# Patient Record
Sex: FEMALE | Race: WHITE | NOT HISPANIC OR LATINO | Employment: FULL TIME | URBAN - METROPOLITAN AREA
[De-identification: names, ages, dates, MRNs, and addresses within clinical notes are randomized per-mention and may not be internally consistent; named-entity substitution may affect disease eponyms.]

---

## 2017-01-23 ENCOUNTER — HOSPITAL ENCOUNTER (OUTPATIENT)
Dept: ULTRASOUND IMAGING | Facility: CLINIC | Age: 32
Discharge: HOME | End: 2017-01-23
Attending: OBSTETRICS & GYNECOLOGY | Admitting: OBSTETRICS & GYNECOLOGY
Payer: COMMERCIAL

## 2017-01-23 DIAGNOSIS — Z34.01 PRENATAL CARE, FIRST PREGNANCY, FIRST TRIMESTER: ICD-10-CM

## 2017-01-23 PROCEDURE — 76805 OB US >/= 14 WKS SNGL FETUS: CPT

## 2017-01-24 ENCOUNTER — PRENATAL OFFICE VISIT (OUTPATIENT)
Dept: OBGYN | Facility: CLINIC | Age: 32
End: 2017-01-24
Payer: COMMERCIAL

## 2017-01-24 VITALS
HEIGHT: 65 IN | BODY MASS INDEX: 30.06 KG/M2 | DIASTOLIC BLOOD PRESSURE: 74 MMHG | HEART RATE: 85 BPM | SYSTOLIC BLOOD PRESSURE: 113 MMHG | WEIGHT: 180.4 LBS

## 2017-01-24 DIAGNOSIS — Z34.02 PRENATAL CARE, FIRST PREGNANCY, SECOND TRIMESTER: ICD-10-CM

## 2017-01-24 DIAGNOSIS — K62.5 BRBPR (BRIGHT RED BLOOD PER RECTUM): Primary | ICD-10-CM

## 2017-01-24 PROCEDURE — 99207 ZZC PRENATAL VISIT: CPT | Performed by: OBSTETRICS & GYNECOLOGY

## 2017-01-24 NOTE — PROGRESS NOTES
Quick Note:    Please call with results and let her know they are normal. Thanks!    Char Burrell MD, MPH  ______

## 2017-01-24 NOTE — NURSING NOTE
"Chief Complaint   Patient presents with     Prenatal Care       Initial /74 mmHg  Pulse 85  Ht 5' 5\" (1.651 m)  Wt 180 lb 6.4 oz (81.829 kg)  BMI 30.02 kg/m2  LMP 09/04/2016  Breastfeeding? No Estimated body mass index is 30.02 kg/(m^2) as calculated from the following:    Height as of this encounter: 5' 5\" (1.651 m).    Weight as of this encounter: 180 lb 6.4 oz (81.829 kg).  BP completed using cuff size: nena Rodriguez CMA      "

## 2017-01-24 NOTE — PROGRESS NOTES
Having bright red blood per rectum.  Started a couple weeks ago, was told it was probably an internal hemorrhoid but now it's getting worse with clots out sometimes.  Denies constipation, feels like it is closer to diarrhea.  Also notes mucus in the discharge.  No melena.  No vomiting.   +FM, no ctx, no VB or LOF.    31 year old  at 20w2d   - GI consult for possible lower endoscopy for worsening BRBPR  - reviewed US results and gave print out - it's a BOY  - GCT, CBC next visit    RTC 4 weeks    Char Burrell MD, MPH  [facility]

## 2017-01-24 NOTE — MR AVS SNAPSHOT
After Visit Summary   1/24/2017    Gisele Meadows    MRN: [id number]           Patient Information     Date Of Birth          1985        Visit Information        Provider Department      1/24/2017 3:45 PM Char Burrell MD [facility]        Today's Diagnoses     BRBPR (bright red blood per rectum)    -  1     Prenatal care, first pregnancy, second trimester            Follow-ups after your visit        Additional Services     GASTROENTEROLOGY ADULT REF CONSULT ONLY       Preferred Location: [facility] [phone] and [facility] [phone]      Please be aware that coverage of these services is subject to the terms and limitations of your health insurance plan.  Call member services at your health plan with any benefit or coverage questions.  Any procedures must be performed at a [place] facility OR coordinated by your clinic's referral office.    Please bring the following with you to your appointment:    (1) Any X-Rays, CTs or MRIs which have been performed.  Contact the facility where they were done to arrange for  prior to your scheduled appointment.    (2) List of current medications   (3) This referral request   (4) Any documents/labs given to you for this referral            GASTROENTEROLOGY ADULT REF PROCEDURE ONLY       Last Lab Result: No results found for: CR  Body mass index is 30.02 kg/(m^2).      Patient will be contacted to schedule procedure.     Please be aware that coverage of these services is subject to the terms and limitations of your health insurance plan.  Call member services at your health plan with any benefit or coverage questions.  Any procedures must be performed at a [place] facility OR coordinated by your clinic's referral office.    Please bring the following with you to your appointment:    (1) Any X-Rays, CTs or MRIs which have been performed.  Contact the facility where they were done to arrange for  prior to your  scheduled appointment.    (2) List of current medications   (3) This referral request   (4) Any documents/labs given to you for this referral                  Your next 10 appointments already scheduled     Feb 20, 2017 11:15 AM   LAB with [facility] ([facility])    [address] [address]   [phone]           Patient must bring picture ID.  Patient should be prepared to give a urine specimen  Please do not eat 10-12 hours before your appointment if you are coming in fasting for labs on lipids, cholesterol, or glucose (sugar).  Pregnant women should follow their Care Team instructions. Water with medications is okay. Do not drink coffee or other fluids.   If you have concerns about taking  your medications, please ask at office or if scheduling via [employer], send a message by clicking on Secure Messaging, Message Your Care Team.            Feb 20, 2017 11:30 AM   ESTABLISHED PRENATAL with Char Burrell MD   [facility]    [address] [address]   [phone]              Who to contact     If you have questions or need follow up information about today's clinic visit or your schedule please contact [facility] directly at [phone].  Normal or non-critical lab and imaging results will be communicated to you by MyChart, letter or phone within 4 business days after the clinic has received the results. If you do not hear from us within 7 days, please contact the clinic through MyChart or phone. If you have a critical or abnormal lab result, we will notify you by phone as soon as possible.  Submit refill requests through [employer] or call your pharmacy and they will forward the refill request to us. Please allow 3 business days for your refill to be completed.          Additional Information About Your Visit        [employer] Information     [employer] gives you secure access to  "your electronic health record. If you see a primary care provider, you can also send messages to your care team and make appointments. If you have questions, please call your primary care clinic.  If you do not have a primary care provider, please call [phone] and they will assist you.        Care EveryWhere ID     This is your Care EveryWhere ID. This could be used by other organizations to access your [place] medical records  [id number]        Your Vitals Were     Pulse Height BMI (Body Mass Index) Last Period Breastfeeding?       85 5' 5\" (1.651 m) 30.02 kg/m2 09/04/2016 No        Blood Pressure from Last 3 Encounters:   01/24/17 113/74   12/27/16 112/68   11/28/16 128/78    Weight from Last 3 Encounters:   01/24/17 180 lb 6.4 oz (81.829 kg)   12/27/16 174 lb 8 oz (79.153 kg)   11/28/16 173 lb (78.472 kg)              We Performed the Following     GASTROENTEROLOGY ADULT REF CONSULT ONLY     GASTROENTEROLOGY ADULT REF PROCEDURE ONLY        Primary Care Provider Office Phone #    [facility] [phone] [address] [address]        Thank you!     Thank you for choosing [facility]  for your care. Our goal is always to provide you with excellent care. Hearing back from our patients is one way we can continue to improve our services. Please take a few minutes to complete the written survey that you may receive in the mail after your visit with us. Thank you!             Your Updated Medication List - Protect others around you: Learn how to safely use, store and throw away your medicines at www.disposemymeds.org.          This list is accurate as of: 1/24/17  4:17 PM.  Always use your most recent med list.                   Brand Name Dispense Instructions for use    albuterol 108 (90 BASE) MCG/ACT Inhaler   Generic drug:  albuterol      Inhale 1-2 puffs into the lungs       prenatal multivitamin  plus iron 27-0.8 MG Tabs per tablet      Take 1 " tablet by mouth daily       valACYclovir 1000 mg tablet    VALTREX    16 tablet    Take 2 tablets (2,000 mg) by mouth 2 times daily

## 2017-02-03 ENCOUNTER — TELEPHONE (OUTPATIENT)
Dept: OBGYN | Facility: CLINIC | Age: 32
End: 2017-02-03

## 2017-02-03 NOTE — TELEPHONE ENCOUNTER
Pt notified of below.  Pt reports understanding.  Pt does not have further questions or concerns.    Mandi Deng   Ob/Gyn Clinic  RN

## 2017-02-03 NOTE — TELEPHONE ENCOUNTER
"S-(situation): diarrhea for past 24 hours    B-(background): ,  has appointment with Gastroenterology next week, hx of loose stools with BRBPR    A-(assessment): patient reports in the last 24 hours, she has been having diarrhea like stools every 2 hours. Patient reports small amounts of blood in stool. Patient reports urinating every 2 hours as well. Patient reports urine is clear in color. Patient reports abdominal cramping prior to diarrhea which resolves after bowel movement's. Patient denies nausea or vomiting. Patient denies headache, dizziness or lightheadedness. Patient is drinking and eating as normal. Patient does not feel dehydrated. Patient does not really feel sick but feels \" acacia yucky I think just from going to the bathroom like that every 2 hours.\" patient denies vaginal bleeding or leaking of fluid. Pt denies fevers. Patient feeling fetal movement.    Patient asking if she can try Imodium AD? If she can, how many times/days?    R-(recommendations): Reviewed with patient to stay well hydrated and push fluids as able. Water or Gatorade, soup broth and BRAT diet reviewed.     Patient reports understanding and in agreement.  Please review and advise.    Thank you.    Mandi Deng   Ob/Gyn Clinic  RN      "

## 2017-02-03 NOTE — TELEPHONE ENCOUNTER
It is fine to take immodium sparingly in pregnancy (it is category C which means we don't think it causes any problems but we don't have enough studies to say for sure) but with heavy diarrhea you can also get dehydrated and if you feel like it is worsening and you can't stay hydrated, you should come in.

## 2017-02-03 NOTE — TELEPHONE ENCOUNTER
Pt called stating that she is 21 weeks EOB was seen last week by Dr. Goss and was referred to a gastroenterologist and has an appointment scheduled next week. Pt states that she is experiencing bouts of diahrrea every 2 hours and is wondering what she should do and if she should be seen in clinic. Please advise.    Yolanda Segura  Clinic Station

## 2017-02-07 ENCOUNTER — HOSPITAL ENCOUNTER (EMERGENCY)
Facility: CLINIC | Age: 32
Discharge: HOME | End: 2017-02-07
Attending: FAMILY MEDICINE | Admitting: FAMILY MEDICINE
Payer: COMMERCIAL

## 2017-02-07 ENCOUNTER — TELEPHONE (OUTPATIENT)
Dept: NURSING | Facility: CLINIC | Age: 32
End: 2017-02-07

## 2017-02-07 VITALS
DIASTOLIC BLOOD PRESSURE: 74 MMHG | OXYGEN SATURATION: 98 % | TEMPERATURE: 98 F | SYSTOLIC BLOOD PRESSURE: 113 MMHG | RESPIRATION RATE: 16 BRPM

## 2017-02-07 DIAGNOSIS — D64.9 ANEMIA, UNSPECIFIED TYPE: ICD-10-CM

## 2017-02-07 DIAGNOSIS — K92.1 HEMATOCHEZIA: ICD-10-CM

## 2017-02-07 LAB
ALBUMIN SERPL-MCNC: 2.4 G/DL (ref 3.4–5)
ALP SERPL-CCNC: 120 U/L (ref 40–150)
ALT SERPL W P-5'-P-CCNC: 19 U/L (ref 0–50)
ANION GAP SERPL CALCULATED.3IONS-SCNC: 7 MMOL/L (ref 3–14)
AST SERPL W P-5'-P-CCNC: 14 U/L (ref 0–45)
BASOPHILS # BLD AUTO: 0.1 10E9/L (ref 0–0.2)
BASOPHILS NFR BLD AUTO: 0.7 %
BILIRUB SERPL-MCNC: <0.1 MG/DL (ref 0.2–1.3)
BUN SERPL-MCNC: 6 MG/DL (ref 7–30)
CALCIUM SERPL-MCNC: 7.9 MG/DL (ref 8.5–10.1)
CHLORIDE SERPL-SCNC: 108 MMOL/L (ref 94–109)
CO2 SERPL-SCNC: 25 MMOL/L (ref 20–32)
CREAT SERPL-MCNC: 0.63 MG/DL (ref 0.52–1.04)
DIFFERENTIAL METHOD BLD: ABNORMAL
EOSINOPHIL # BLD AUTO: 1.5 10E9/L (ref 0–0.7)
EOSINOPHIL NFR BLD AUTO: 13.6 %
ERYTHROCYTE [DISTWIDTH] IN BLOOD BY AUTOMATED COUNT: 13.2 % (ref 10–15)
GFR SERPL CREATININE-BSD FRML MDRD: ABNORMAL ML/MIN/1.7M2
GLUCOSE SERPL-MCNC: 70 MG/DL (ref 70–99)
HCT VFR BLD AUTO: 29.1 % (ref 35–47)
HGB BLD-MCNC: 9.7 G/DL (ref 11.7–15.7)
IMM GRANULOCYTES # BLD: 0 10E9/L (ref 0–0.4)
IMM GRANULOCYTES NFR BLD: 0.3 %
LYMPHOCYTES # BLD AUTO: 3.2 10E9/L (ref 0.8–5.3)
LYMPHOCYTES NFR BLD AUTO: 29 %
MCH RBC QN AUTO: 31.1 PG (ref 26.5–33)
MCHC RBC AUTO-ENTMCNC: 33.3 G/DL (ref 31.5–36.5)
MCV RBC AUTO: 93 FL (ref 78–100)
MONOCYTES # BLD AUTO: 1.9 10E9/L (ref 0–1.3)
MONOCYTES NFR BLD AUTO: 16.9 %
NEUTROPHILS # BLD AUTO: 4.3 10E9/L (ref 1.6–8.3)
NEUTROPHILS NFR BLD AUTO: 39.5 %
PLATELET # BLD AUTO: 398 10E9/L (ref 150–450)
POTASSIUM SERPL-SCNC: 3.8 MMOL/L (ref 3.4–5.3)
PROT SERPL-MCNC: 6.4 G/DL (ref 6.8–8.8)
RBC # BLD AUTO: 3.12 10E12/L (ref 3.8–5.2)
SODIUM SERPL-SCNC: 140 MMOL/L (ref 133–144)
WBC # BLD AUTO: 11 10E9/L (ref 4–11)

## 2017-02-07 PROCEDURE — 99284 EMERGENCY DEPT VISIT MOD MDM: CPT | Performed by: FAMILY MEDICINE

## 2017-02-07 PROCEDURE — 85025 COMPLETE CBC W/AUTO DIFF WBC: CPT | Performed by: FAMILY MEDICINE

## 2017-02-07 PROCEDURE — 83540 ASSAY OF IRON: CPT | Performed by: FAMILY MEDICINE

## 2017-02-07 PROCEDURE — 82728 ASSAY OF FERRITIN: CPT | Performed by: FAMILY MEDICINE

## 2017-02-07 PROCEDURE — 80053 COMPREHEN METABOLIC PANEL: CPT | Performed by: FAMILY MEDICINE

## 2017-02-07 PROCEDURE — 99283 EMERGENCY DEPT VISIT LOW MDM: CPT

## 2017-02-07 PROCEDURE — 83550 IRON BINDING TEST: CPT | Performed by: FAMILY MEDICINE

## 2017-02-07 NOTE — ED AVS SNAPSHOT
[facility] Emergency Department    [address] [address]    Phone:  [phone]    Fax:  [phone]                                       Gisele Meadows   MRN: [id number]    Department:  [facility] Emergency Department   Date of Visit:  2/7/2017           After Visit Summary Signature Page     I have received my discharge instructions, and my questions have been answered. I have discussed any challenges I see with this plan with the nurse or doctor.    ..........................................................................................................................................  Patient/Patient Representative Signature      ..........................................................................................................................................  Patient Representative Print Name and Relationship to Patient    ..................................................               ................................................  Date                                            Time    ..........................................................................................................................................  Reviewed by Signature/Title    ...................................................              ..............................................  Date                                                            Time

## 2017-02-07 NOTE — ED AVS SNAPSHOT
[facility] Emergency Department    [address] [address]    Phone:  [phone]    Fax:  [phone]                                       Gisele Meadows   MRN: [id number]    Department:  [facility] Emergency Department   Date of Visit:  2/7/2017           Patient Information     Date Of Birth          1985        Your diagnoses for this visit were:     Hematochezia     Anemia, unspecified type        You were seen by Charlie Hendricks MD.        Discharge Instructions       Collect a stool sample and bring it to the lab for culture.  Call your ObGyn clinic to arrange for repeat testing of your hemoglobin later this week  Return to the emergency department if bleeding increases or he begins to feel lightheaded or have other new symptoms such as abdominal pain or rectal pain.    Future Appointments        Provider Department Dept Phone Center    2/20/2017 11:15 AM [facility] [phone] [facility]    2/20/2017 11:30 AM Char Burrell MD [facility] [phone] [facility]      24 Hour Appointment Hotline       To make an appointment at any [facility], call [phone] ([phone]). If you don't have a family doctor or clinic, we will help you find one. [facility] clinics are conveniently located to serve the needs of you and your family.          ED Discharge Orders     Enteric Bacteria and Virus Panel by MAJOR Stool                    Review of your medicines      Our records show that you are taking the medicines listed below. If these are incorrect, please call your family doctor or clinic.        Dose / Directions Last dose taken    albuterol 108 (90 BASE) MCG/ACT Inhaler   Dose:  1-2 puff   Generic drug:  albuterol        Inhale 1-2 puffs into the lungs   Refills:  0        prenatal multivitamin  plus iron 27-0.8 MG Tabs per tablet   Dose:  1 tablet        Take 1 tablet by mouth daily   Refills:  0        valACYclovir 1000 mg tablet   Commonly  known as:  VALTREX   Dose:  2000 mg   Quantity:  16 tablet        Take 2 tablets (2,000 mg) by mouth 2 times daily   Refills:  11                Procedures and tests performed during your visit     CBC with platelets differential    Comprehensive metabolic panel      Orders Needing Specimen Collection     Ordered          02/07/17 2131  Enteric Bacteria and Virus Panel by MAJOR Stool - STAT, Pri[place]: STAT, Needs to be Collected     Scheduled Task Status   02/07/17 2132 Collect Enteric Bacteria and Virus Panel by MAJOR Stool Open   Order Class:  PCU Collect                  Pending Results     No orders found from 2/6/2017 to 2/8/2017.            Pending Culture Results     No orders found from 2/6/2017 to 2/8/2017.       Test Results from your hospital stay           2/7/2017 10:38 PM - Interface, Flexilab Results      Component Results     Component Value Ref Range & Units Status    WBC 11.0 4.0 - 11.0 10e9/L Final    RBC Count 3.12 (L) 3.8 - 5.2 10e12/L Final    Hemoglobin 9.7 (L) 11.7 - 15.7 g/dL Final    Hematocrit 29.1 (L) 35.0 - 47.0 % Final    MCV 93 78 - 100 fl Final    MCH 31.1 26.5 - 33.0 pg Final    MCHC 33.3 31.5 - 36.5 g/dL Final    RDW 13.2 10.0 - 15.0 % Final    Platelet Count 398 150 - 450 10e9/L Final    Diff Method Automated Method  Final    % Neutrophils 39.5 % Final    % Lymphocytes 29.0 % Final    % Monocytes 16.9 % Final    % Eosinophils 13.6 % Final    % Basophils 0.7 % Final    % Immature Granulocytes 0.3 % Final    Absolute Neutrophil 4.3 1.6 - 8.3 10e9/L Final    Absolute Lymphocytes 3.2 0.8 - 5.3 10e9/L Final    Absolute Monocytes 1.9 (H) 0.0 - 1.3 10e9/L Final    Absolute Eosinophils 1.5 (H) 0.0 - 0.7 10e9/L Final    Absolute Basophils 0.1 0.0 - 0.2 10e9/L Final    Abs Immature Granulocytes 0.0 0 - 0.4 10e9/L Final         2/7/2017 10:54 PM - Interface, Flexilab Results      Component Results     Component Value Ref Range & Units Status    Sodium 140 133 - 144 mmol/L Final    Potassium 3.8 3.4 -  5.3 mmol/L Final    Chloride 108 94 - 109 mmol/L Final    Carbon Dioxide 25 20 - 32 mmol/L Final    Anion Gap 7 3 - 14 mmol/L Final    Glucose 70 70 - 99 mg/dL Final    Urea Nitrogen 6 (L) 7 - 30 mg/dL Final    Creatinine 0.63 0.52 - 1.04 mg/dL Final    GFR Estimate >90  Non  GFR Calc   >60 mL/min/1.7m2 Final    GFR Estimate If Black >90   GFR Calc   >60 mL/min/1.7m2 Final    Calcium 7.9 (L) 8.5 - 10.1 mg/dL Final    Bilirubin Total <0.1 (L) 0.2 - 1.3 mg/dL Final    Albumin 2.4 (L) 3.4 - 5.0 g/dL Final    Protein Total 6.4 (L) 6.8 - 8.8 g/dL Final    Alkaline Phosphatase 120 40 - 150 U/L Final    ALT 19 0 - 50 U/L Final    AST 14 0 - 45 U/L Final                Thank you for choosing [facility]       Thank you for choosing [facility] for your care. Our goal is always to provide you with excellent care. Hearing back from our patients is one way we can continue to improve our services. Please take a few minutes to complete the written survey that you may receive in the mail after you visit with us. Thank you!        [employer] Information     [employer] gives you secure access to your electronic health record. If you see a primary care provider, you can also send messages to your care team and make appointments. If you have questions, please call your primary care clinic.  If you do not have a primary care provider, please call [phone] and they will assist you.        Care EveryWhere ID     This is your Care EveryWhere ID. This could be used by other organizations to access your [facility] medical records  [id number]        After Visit Summary       This is your record. Keep this with you and show to your community pharmacist(s) and doctor(s) at your next visit.

## 2017-02-08 ENCOUNTER — PRENATAL OFFICE VISIT (OUTPATIENT)
Dept: OBGYN | Facility: CLINIC | Age: 32
End: 2017-02-08
Payer: COMMERCIAL

## 2017-02-08 VITALS
HEIGHT: 65 IN | BODY MASS INDEX: 29.59 KG/M2 | WEIGHT: 177.6 LBS | DIASTOLIC BLOOD PRESSURE: 56 MMHG | HEART RATE: 85 BPM | SYSTOLIC BLOOD PRESSURE: 106 MMHG

## 2017-02-08 DIAGNOSIS — K92.1 HEMATOCHEZIA: ICD-10-CM

## 2017-02-08 DIAGNOSIS — O26.842 UTERINE SIZE DATE DISCREPANCY, SECOND TRIMESTER: ICD-10-CM

## 2017-02-08 DIAGNOSIS — K62.5 HEMORRHAGE OF RECTUM AND ANUS: Primary | ICD-10-CM

## 2017-02-08 DIAGNOSIS — Z34.02 PRENATAL CARE, FIRST PREGNANCY, SECOND TRIMESTER: ICD-10-CM

## 2017-02-08 LAB
CAMPYLOBACTER GROUP BY NAT: NOT DETECTED
ENTERIC PATHOGEN COMMENT: NORMAL
FERRITIN SERPL-MCNC: 12 NG/ML (ref 12–150)
IRON SATN MFR SERPL: 9 % (ref 15–46)
IRON SERPL-MCNC: 32 UG/DL (ref 35–180)
NOROVIRUS I AND II BY NAT: NOT DETECTED
ROTAVIRUS A BY NAT: NOT DETECTED
SALMONELLA SPECIES BY NAT: NOT DETECTED
SHIGA TOXIN 1 GENE BY NAT: NOT DETECTED
SHIGA TOXIN 2 GENE BY NAT: NOT DETECTED
SHIGELLA SP+EIEC IPAH STL QL NAA+PROBE: NOT DETECTED
TIBC SERPL-MCNC: 349 UG/DL (ref 240–430)
VIBRIO GROUP BY NAT: NOT DETECTED
YERSINIA ENTEROCOLITICA BY NAT: NOT DETECTED

## 2017-02-08 PROCEDURE — 87506 IADNA-DNA/RNA PROBE TQ 6-11: CPT | Performed by: FAMILY MEDICINE

## 2017-02-08 PROCEDURE — 99207 ZZC PRENATAL VISIT: CPT | Performed by: OBSTETRICS & GYNECOLOGY

## 2017-02-08 RX ORDER — FERROUS GLUCONATE 324(38)MG
324 TABLET ORAL
Qty: 100 TABLET | Refills: 3 | Status: SHIPPED | OUTPATIENT
Start: 2017-02-08 | End: 2019-06-27

## 2017-02-08 NOTE — NURSING NOTE
"Chief Complaint   Patient presents with     Prenatal Care     er follow up, low hemoglobin       Initial /56 mmHg  Pulse 85  Ht 5' 5\" (1.651 m)  Wt 177 lb 9.6 oz (80.559 kg)  BMI 29.55 kg/m2  LMP 09/04/2016 Estimated body mass index is 29.55 kg/(m^2) as calculated from the following:    Height as of this encounter: 5' 5\" (1.651 m).    Weight as of this encounter: 177 lb 9.6 oz (80.559 kg).  Medication Reconciliation: complete     Lois King LPN      "

## 2017-02-08 NOTE — PROGRESS NOTES
"CC: prenatal visit  S:  Had heavy rectal bleeding yesterday and went to the Er.  It is better today. Has had diarrhea for weeks now.  No cramping/lof/vb/dc.  Good fm.  /56 mmHg  Pulse 85  Ht 5' 5\" (1.651 m)  Wt 177 lb 9.6 oz (80.559 kg)  BMI 29.55 kg/m2  LMP 09/04/2016  Stool cultures negative  The iron studies show low iron  A/P rectal bleeding-GI tomorrow  Anemia-iron three times a day  F/u ady     US for growth  Anuja Jauregui MD    "

## 2017-02-08 NOTE — TELEPHONE ENCOUNTER
Call Type: Triage Call    Presenting Problem: 22 weeks pregnant with rectal bleeding.  has GI  appt 2/9/2017 for this. Tonight had loose stool with more blood than  in past including clots. No s/s hypovolemia. Instructed ED per GI  Bleeding guideline.  Triage Note:  Guideline Title: Gastrointestinal Bleeding  Recommended Disposition: See ED Immediately  Original Inclination: Wanted to speak with a nurse  Override Disposition:  Intended Action: Follow advice given  Physician Contacted: No  Bloody diarrhea AND has not been evaluated ?  YES  New or worsening signs and symptoms that may indicate shock ? NO  Vomited blood after nosebleed ? NO  Following ingestion of toxic or caustic substance ? NO  Passing red, black or tarry material from rectum AND onset of new signs and  symptoms of hypovolemia ? NO  Unbearable abdominal/pelvic pain ? NO  Vomiting red, bloody or coffee-ground material, more than streaks of blood or  scant amount (not following nosebleed within past day) ? NO  Chest discomfort associated with shortness of breath, sweating, odd heartbeats or  different heart rate, nausea, vomiting, lightheadedness, or fainting lasting 5 or  more minutes now or within the last hour ? NO  Chest pain spreading to the shoulders, neck, jaw, in one or both arms, stomach or  back lasting 5 or more minutes now or within the last hour. Pain is NOT  associated with taking a deep breath or a productive cough, movement, or touch to  a localized area. ? NO  Pressure, fullness, squeezing sensation or pain anywhere in the chest lasting 5 or  more minutes now or within the last hour. Pain is NOT associated with taking a  deep breath or a productive cough, movement, or touch to a localized area on the  chest. ? NO  History of esophageal varices AND more than one episode of black or tarry stool ?  NO  Physician Instructions:  Care Advice: Another adult should drive.  Call  if signs and symptoms of shock develop (such as unable  to  stand due to faintness, dizziness, or lightheadedness  new onset of confusion  slow to respond or difficult to awaken  skin is pale, gray, cool, or moist to touch  severe weakness  loss of consciousness).  IMMEDIATE ACTION  Take sips of water or suck on ice chips as tolerated.  Do not eat solid  food until seen by provider.

## 2017-02-08 NOTE — DISCHARGE INSTRUCTIONS
Collect a stool sample and bring it to the lab for culture.  Call your ObGyn clinic to arrange for repeat testing of your hemoglobin later this week  Return to the emergency department if bleeding increases or he begins to feel lightheaded or have other new symptoms such as abdominal pain or rectal pain.

## 2017-02-09 ENCOUNTER — TRANSFERRED RECORDS (OUTPATIENT)
Dept: HEALTH INFORMATION MANAGEMENT | Facility: CLINIC | Age: 32
End: 2017-02-09

## 2017-02-20 ENCOUNTER — PRENATAL OFFICE VISIT (OUTPATIENT)
Dept: OBGYN | Facility: CLINIC | Age: 32
End: 2017-02-20
Payer: COMMERCIAL

## 2017-02-20 VITALS
SYSTOLIC BLOOD PRESSURE: 105 MMHG | HEART RATE: 88 BPM | BODY MASS INDEX: 29.99 KG/M2 | DIASTOLIC BLOOD PRESSURE: 73 MMHG | WEIGHT: 180 LBS | HEIGHT: 65 IN

## 2017-02-20 DIAGNOSIS — Z34.02 PRENATAL CARE, FIRST PREGNANCY, SECOND TRIMESTER: ICD-10-CM

## 2017-02-20 DIAGNOSIS — K64.8 INTERNAL HEMORRHOID: ICD-10-CM

## 2017-02-20 DIAGNOSIS — K62.5 HEMORRHAGE OF RECTUM AND ANUS: ICD-10-CM

## 2017-02-20 DIAGNOSIS — Z34.02 PRENATAL CARE, FIRST PREGNANCY, SECOND TRIMESTER: Primary | ICD-10-CM

## 2017-02-20 DIAGNOSIS — O26.842 UTERINE SIZE DATE DISCREPANCY, SECOND TRIMESTER: ICD-10-CM

## 2017-02-20 LAB
GLUCOSE 1H P 50 G GLC PO SERPL-MCNC: 120 MG/DL (ref 60–129)
HGB BLD-MCNC: 10.3 G/DL (ref 11.7–15.7)

## 2017-02-20 PROCEDURE — 00000218 ZZHCL STATISTIC OBHBG - HEMOGLOBIN: Performed by: OBSTETRICS & GYNECOLOGY

## 2017-02-20 PROCEDURE — 99207 ZZC PRENATAL VISIT: CPT | Performed by: OBSTETRICS & GYNECOLOGY

## 2017-02-20 PROCEDURE — 36415 COLL VENOUS BLD VENIPUNCTURE: CPT | Performed by: OBSTETRICS & GYNECOLOGY

## 2017-02-20 PROCEDURE — 82950 GLUCOSE TEST: CPT | Performed by: OBSTETRICS & GYNECOLOGY

## 2017-02-20 NOTE — NURSING NOTE
"Chief Complaint   Patient presents with     Prenatal Care       Initial /73 (BP Location: Right arm, Patient Position: Chair, Cuff Size: Adult Large)  Pulse 88  Ht 5' 5\" (1.651 m)  Wt 180 lb (81.6 kg)  LMP 09/04/2016  Breastfeeding? No  BMI 29.95 kg/m2 Estimated body mass index is 29.95 kg/(m^2) as calculated from the following:    Height as of this encounter: 5' 5\" (1.651 m).    Weight as of this encounter: 180 lb (81.6 kg).  Medication Reconciliation: complete   Jackie Rodriguez, JESSE      "

## 2017-02-20 NOTE — MR AVS SNAPSHOT
After Visit Summary   2/20/2017    Gisele Meadows    MRN: [id number]           Patient Information     Date Of Birth          1985        Visit Information        Provider Department      2/20/2017 11:30 AM Char Burrell MD [facility]        Today's Diagnoses     Prenatal care, first pregnancy, second trimester    -  1    Internal hemorrhoid           Follow-ups after your visit        Follow-up notes from your care team     Return in about 4 weeks (around 3/20/2017) for OB Follow Up.      Your next 10 appointments already scheduled     Mar 22, 2017  3:45 PM CDT   ESTABLISHED PRENATAL with Anuja Jauregui MD   [facility] ([facility]    [address] [address] [phone]              Who to contact     If you have questions or need follow up information about today's clinic visit or your schedule please contact [facility] directly at [phone].  Normal or non-critical lab and imaging results will be communicated to you by [employer], letter or phone within 4 business days after the clinic has received the results. If you do not hear from us within 7 days, please contact the clinic through Mind[employer] or phone. If you have a critical or abnormal lab result, we will notify you by phone as soon as possible.  Submit refill requests through K[employer]le or call your pharmacy and they will forward the refill request to us. Please allow 3 business days for your refill to be completed.          Additional Information About Your Visit        [employer] Information     [employer] gives you secure access to your electronic health record. If you see a primary care provider, you can also send messages to your care team and make appointments. If you have questions, please call your primary care clinic.  If you do not have a primary care provider, please call [phone] and they will assist you.        Care EveryWhere  "ID     This is your Care EveryWhere ID. This could be used by other organizations to access your [place] medical records  [id number]        Your Vitals Were     Pulse Height Last Period Breastfeeding? BMI (Body Mass Index)       88 1.651 m (5' 5\") 09/04/2016 No 29.95 kg/m2        Blood Pressure from Last 3 Encounters:   02/20/17 105/73   02/08/17 106/56   02/07/17 113/74    Weight from Last 3 Encounters:   02/20/17 81.6 kg (180 lb)   02/08/17 80.6 kg (177 lb 9.6 oz)   01/24/17 81.8 kg (180 lb 6.4 oz)              Today, you had the following     No orders found for display       Primary Care Provider Office Phone #    [facility] [phone] [address] [address]        Thank you!     Thank you for choosing [facility]  for your care. Our goal is always to provide you with excellent care. Hearing back from our patients is one way we can continue to improve our services. Please take a few minutes to complete the written survey that you may receive in the mail after your visit with us. Thank you!             Your Updated Medication List - Protect others around you: Learn how to safely use, store and throw away your medicines at www.disposemymeds.org.          This list is accurate as of: 2/20/17 11:45 AM.  Always use your most recent med list.                   Brand Name Dispense Instructions for use    albuterol 108 (90 BASE) MCG/ACT Inhaler   Generic drug:  albuterol      Inhale 1-2 puffs into the lungs       ferrous gluconate 324 (38 FE) MG tablet    FERGON    100 tablet    Take 1 tablet (324 mg) by mouth 3 times daily (with meals)       prenatal multivitamin  plus iron 27-0.8 MG Tabs per tablet      Take 1 tablet by mouth daily       valACYclovir 1000 mg tablet    VALTREX    16 tablet    Take 2 tablets (2,000 mg) by mouth 2 times daily         "

## 2017-02-20 NOTE — PROGRESS NOTES
Doing better, s/p GI consult.  They thought it was probably bleeding from internal hemorrhoids and deferred the endoscope for now.  They put her on metamucil.  She also started iron since being seen in the ED.  The BRBPR bleeding has improved, and the stools are now a little more firm.  She is taking iron supplements TID.  +FM, no ctx, no VB or LOF.    31 year old  at 24w1d   - GCT and hgb today  -h/o HSV2, last outbreak about a month ago - still start valtrex ppx at 36 weeks  - TDaP next visit    RTC 4 weeks    Char Burrell MD, MPH  [facility]

## 2017-03-20 ENCOUNTER — HOSPITAL ENCOUNTER (OUTPATIENT)
Dept: ULTRASOUND IMAGING | Facility: CLINIC | Age: 32
Discharge: HOME | End: 2017-03-20
Attending: OBSTETRICS & GYNECOLOGY | Admitting: OBSTETRICS & GYNECOLOGY
Payer: COMMERCIAL

## 2017-03-20 PROCEDURE — 76816 OB US FOLLOW-UP PER FETUS: CPT

## 2017-03-21 NOTE — PROGRESS NOTES
Gisele  Your results are normal.  If you have any other questions or concerns, please followup in the office or contact us on mychart or evisit.    Anuja Jauregui

## 2017-03-22 ENCOUNTER — PRENATAL OFFICE VISIT (OUTPATIENT)
Dept: OBGYN | Facility: CLINIC | Age: 32
End: 2017-03-22
Payer: COMMERCIAL

## 2017-03-22 VITALS
SYSTOLIC BLOOD PRESSURE: 117 MMHG | WEIGHT: 185.4 LBS | BODY MASS INDEX: 30.85 KG/M2 | DIASTOLIC BLOOD PRESSURE: 72 MMHG | HEART RATE: 89 BPM

## 2017-03-22 DIAGNOSIS — Z34.02 PRENATAL CARE, FIRST PREGNANCY, SECOND TRIMESTER: ICD-10-CM

## 2017-03-22 DIAGNOSIS — A60.00 RECURRENT GENITAL HSV (HERPES SIMPLEX VIRUS) INFECTION: ICD-10-CM

## 2017-03-22 DIAGNOSIS — Z23 NEED FOR TDAP VACCINATION: Primary | ICD-10-CM

## 2017-03-22 PROCEDURE — 99207 ZZC PRENATAL VISIT: CPT | Performed by: OBSTETRICS & GYNECOLOGY

## 2017-03-22 PROCEDURE — 90471 IMMUNIZATION ADMIN: CPT | Performed by: OBSTETRICS & GYNECOLOGY

## 2017-03-22 PROCEDURE — 90715 TDAP VACCINE 7 YRS/> IM: CPT | Performed by: OBSTETRICS & GYNECOLOGY

## 2017-03-22 RX ORDER — VALACYCLOVIR HYDROCHLORIDE 500 MG/1
TABLET, FILM COATED ORAL
Qty: 90 TABLET | Refills: 3 | Status: SHIPPED | OUTPATIENT
Start: 2017-03-22 | End: 2017-05-17

## 2017-03-22 NOTE — MR AVS SNAPSHOT
After Visit Summary   3/22/2017    Gisele Meadows    MRN: [id number]           Patient Information     Date Of Birth          1985        Visit Information        Provider Department      3/22/2017 3:45 PM Anuja Jauregui MD [facility]        Today's Diagnoses     Need for Tdap vaccination    -  1    Twin pregnancy with fetal loss and retention of one fetus, antepartum        Prenatal care, first pregnancy, second trimester        Recurrent genital HSV (herpes simplex virus) infection           Follow-ups after your visit        Who to contact     If you have questions or need follow up information about today's clinic visit or your schedule please contact [facility] directly at [phone].  Normal or non-critical lab and imaging results will be communicated to you by MyChart, letter or phone within 4 business days after the clinic has received the results. If you do not hear from us within 7 days, please contact the clinic through [employer] or phone. If you have a critical or abnormal lab result, we will notify you by phone as soon as possible.  Submit refill requests through Best Bid or call your pharmacy and they will forward the refill request to us. Please allow 3 business days for your refill to be completed.          Additional Information About Your Visit        MyChart Information     [employer] gives you secure access to your electronic health record. If you see a primary care provider, you can also send messages to your care team and make appointments. If you have questions, please call your primary care clinic.  If you do not have a primary care provider, please call [phone] and they will assist you.        Care EveryWhere ID     This is your Care EveryWhere ID. This could be used by other organizations to access your [place] medical records  [id number]        Your Vitals Were     Pulse Last Period BMI (Body Mass Index)             89  09/04/2016 30.85 kg/m2          Blood Pressure from Last 3 Encounters:   03/22/17 117/72   02/20/17 105/73   02/08/17 106/56    Weight from Last 3 Encounters:   03/22/17 185 lb 6.4 oz (84.1 kg)   02/20/17 180 lb (81.6 kg)   02/08/17 177 lb 9.6 oz (80.6 kg)              We Performed the Following     TDAP VACCINE (ADACEL)     VACCINE ADMINISTRATION, INITIAL          Today's Medication Changes          These changes are accurate as of: 3/22/17  4:05 PM.  If you have any questions, ask your nurse or doctor.               These medicines have changed or have updated prescriptions.        Dose/Directions    * valACYclovir 1000 mg tablet   Commonly known as:  VALTREX   This may have changed:  Another medication with the same name was added. Make sure you understand how and when to take each.   Used for:  Recurrent cold sores        Dose:  2000 mg   Take 2 tablets (2,000 mg) by mouth 2 times daily   Quantity:  16 tablet   Refills:  11       * valACYclovir 500 MG tablet   Commonly known as:  VALTREX   This may have changed:  You were already taking a medication with the same name, and this prescription was added. Make sure you understand how and when to take each.   Used for:  Twin pregnancy with fetal loss and retention of one fetus, antepartum, Prenatal care, first pregnancy, second trimester, Need for Tdap vaccination, Recurrent genital HSV (herpes simplex virus) infection        Take 1 tablet twice a day for 3 days and then start daily   Quantity:  90 tablet   Refills:  3       * Notice:  This list has 2 medication(s) that are the same as other medications prescribed for you. Read the directions carefully, and ask your doctor or other care provider to review them with you.         Where to get your medicines      These medications were sent to [id number]  [place], [place] - [address]  [address] [place] [address]     Phone:  [phone]     valACYclovir 500 MG tablet                Primary Care  Provider Office Phone #    [facility] [phone] [address]  [facility] [address]        Thank you!     Thank you for choosing [facility]  for your care. Our goal is always to provide you with excellent care. Hearing back from our patients is one way we can continue to improve our services. Please take a few minutes to complete the written survey that you may receive in the mail after your visit with us. Thank you!             Your Updated Medication List - Protect others around you: Learn how to safely use, store and throw away your medicines at www.disposemymeds.org.          This list is accurate as of: 3/22/17  4:05 PM.  Always use your most recent med list.                   Brand Name Dispense Instructions for use    albuterol 108 (90 BASE) MCG/ACT Inhaler   Generic drug:  albuterol      Inhale 1-2 puffs into the lungs       ferrous gluconate 324 (38 FE) MG tablet    FERGON    100 tablet    Take 1 tablet (324 mg) by mouth 3 times daily (with meals)       prenatal multivitamin  plus iron 27-0.8 MG Tabs per tablet      Take 1 tablet by mouth daily       * valACYclovir 1000 mg tablet    VALTREX    16 tablet    Take 2 tablets (2,000 mg) by mouth 2 times daily       * valACYclovir 500 MG tablet    VALTREX    90 tablet    Take 1 tablet twice a day for 3 days and then start daily       * Notice:  This list has 2 medication(s) that are the same as other medications prescribed for you. Read the directions carefully, and ask your doctor or other care provider to review them with you.

## 2017-03-22 NOTE — NURSING NOTE
"Initial /72 (BP Location: Right arm, Patient Position: Chair, Cuff Size: Adult Regular)  Pulse 89  Wt 185 lb 6.4 oz (84.1 kg)  LMP 09/04/2016  BMI 30.85 kg/m2 Estimated body mass index is 30.85 kg/(m^2) as calculated from the following:    Height as of 2/20/17: 5' 5\" (1.651 m).    Weight as of this encounter: 185 lb 6.4 oz (84.1 kg). .    "

## 2017-03-22 NOTE — PROGRESS NOTES
CC: prenatal  S:  Having an outbreak starting last week of her herpetic genital infection she was diagnosed with in the past.  Only took valtrex with that outbreak which when she was diagnosed in August.  No ctx/lof/vb.  Good fm.  /72 (BP Location: Right arm, Patient Position: Chair, Cuff Size: Adult Regular)  Pulse 89  Wt 185 lb 6.4 oz (84.1 kg)  LMP 09/04/2016  BMI 30.85 kg/m2   Estimated fetal weight: 1,514 grams, corresponding to the 82nd  percentile based on the reported previously established due date.          IMPRESSION:   1. Single live intrauterine pregnancy of 30 weeks and 3 days gestation  by current ultrasound measurement. Fetal growth is 2 weeks and 2 days  more advanced than what is expected from the reported previously  established due date.  2. Estimated fetal weight is at the 82nd percentile.      A/P recurrent HSV-valtrex twice a day for 3 days and then start suppression now.  She desires.  Routine precautions  Breastfeeding discussed  Doing the classes  RTC in 2 week  Anuja Jauregui MD

## 2017-04-05 ENCOUNTER — PRENATAL OFFICE VISIT (OUTPATIENT)
Dept: OBGYN | Facility: CLINIC | Age: 32
End: 2017-04-05
Payer: COMMERCIAL

## 2017-04-05 VITALS
BODY MASS INDEX: 31.16 KG/M2 | SYSTOLIC BLOOD PRESSURE: 125 MMHG | HEART RATE: 92 BPM | WEIGHT: 187 LBS | DIASTOLIC BLOOD PRESSURE: 76 MMHG | HEIGHT: 65 IN

## 2017-04-05 DIAGNOSIS — O98.313 GENITAL HERPES AFFECTING PREGNANCY IN THIRD TRIMESTER: ICD-10-CM

## 2017-04-05 DIAGNOSIS — A60.00 RECURRENT GENITAL HSV (HERPES SIMPLEX VIRUS) INFECTION: ICD-10-CM

## 2017-04-05 DIAGNOSIS — A60.09 GENITAL HERPES AFFECTING PREGNANCY IN THIRD TRIMESTER: ICD-10-CM

## 2017-04-05 DIAGNOSIS — Z34.03 PRENATAL CARE, FIRST PREGNANCY, THIRD TRIMESTER: Primary | ICD-10-CM

## 2017-04-05 PROCEDURE — 99207 ZZC PRENATAL VISIT: CPT | Performed by: OBSTETRICS & GYNECOLOGY

## 2017-04-05 RX ORDER — VALACYCLOVIR HYDROCHLORIDE 1 G/1
1000 TABLET, FILM COATED ORAL DAILY
Qty: 60 TABLET | Refills: 3 | Status: SHIPPED | OUTPATIENT
Start: 2017-04-05 | End: 2018-11-19

## 2017-04-05 NOTE — PROGRESS NOTES
"CC: prenatal  HSV outbreak  S:  The outbreak wasn't healing with the prior dose, she is starting the 1 gram 3 times per day for 7 days and then will increase prophylaxis to a 1gram a day.  No lof/vb/dc.  Good fm.  [place] cason resolved.  /76  Pulse 92  Ht 5' 5\" (1.651 m)  Wt 187 lb (84.8 kg)  LMP 09/04/2016  BMI 31.12 kg/m2  She declined an exam this time  A/P Recurrent HSV-plan for increasing the dose.    Routine precautions  F/u 2 weeks  Anuja Jauregui MD    "

## 2017-04-05 NOTE — NURSING NOTE
"Chief Complaint   Patient presents with     Prenatal Care       Initial /76  Pulse 92  Ht 5' 5\" (1.651 m)  Wt 187 lb (84.8 kg)  LMP 09/04/2016  BMI 31.12 kg/m2 Estimated body mass index is 31.12 kg/(m^2) as calculated from the following:    Height as of this encounter: 5' 5\" (1.651 m).    Weight as of this encounter: 187 lb (84.8 kg).  Medication Reconciliation: complete     Lois King LPN        "

## 2017-04-05 NOTE — MR AVS SNAPSHOT
After Visit Summary   4/5/2017    Gisele Meadows    MRN: [id number]           Patient Information     Date Of Birth          1985        Visit Information        Provider Department      4/5/2017 3:30 PM Anuja Jauregui MD [facility]        Today's Diagnoses     Prenatal care, first pregnancy, third trimester    -  1    Genital herpes affecting pregnancy in third trimester        Recurrent genital HSV (herpes simplex virus) infection           Follow-ups after your visit        Your next 10 appointments already scheduled     Apr 18, 2017  3:00 PM CDT   ESTABLISHED PRENATAL with Destiny Cates MD   [facility]    [address] [facility] [address]   [phone]            May 02, 2017  3:45 PM CDT   ESTABLISHED PRENATAL with Destiny Cates MD   [facility]    [address] [address]   [phone]            May 17, 2017  4:00 PM CDT   ESTABLISHED PRENATAL with Destiny Cates MD   [facility]    [address] [address]   [phone]            May 24, 2017  3:30 PM CDT   ESTABLISHED PRENATAL with Anuja Jauregui MD   [facility]    [address] [facility] [address]   [phone]            May 30, 2017  3:30 PM CDT   ESTABLISHED PRENATAL with Destiny Cates MD   [facility]    [address] [address]   [phone]              Who to contact     If you have questions or need follow up information about today's clinic visit or your schedule please contact [facility] directly at [phone].  Normal or non-critical lab and imaging results will be communicated to you by MyChart, letter or phone within 4 business days after the clinic  "has received the results. If you do not hear from us within 7 days, please contact the clinic through [employer] or phone. If you have a critical or abnormal lab result, we will notify you by phone as soon as possible.  Submit refill requests through [employer] or call your pharmacy and they will forward the refill request to us. Please allow 3 business days for your refill to be completed.          Additional Information About Your Visit        [employer] Information     [employer] gives you secure access to your electronic health record. If you see a primary care provider, you can also send messages to your care team and make appointments. If you have questions, please call your primary care clinic.  If you do not have a primary care provider, please call [phone] and they will assist you.        Care EveryWhere ID     This is your Care EveryWhere ID. This could be used by other organizations to access your [place] medical records  [id number]        Your Vitals Were     Pulse Height Last Period BMI (Body Mass Index)          92 5' 5\" (1.651 m) 09/04/2016 31.12 kg/m2         Blood Pressure from Last 3 Encounters:   04/05/17 125/76   03/22/17 117/72   02/20/17 105/73    Weight from Last 3 Encounters:   04/05/17 187 lb (84.8 kg)   03/22/17 185 lb 6.4 oz (84.1 kg)   02/20/17 180 lb (81.6 kg)              Today, you had the following     No orders found for display         Today's Medication Changes          These changes are accurate as of: 4/5/17  3:44 PM.  If you have any questions, ask your nurse or doctor.               These medicines have changed or have updated prescriptions.        Dose/Directions    * valACYclovir 1000 mg tablet   Commonly known as:  VALTREX   This may have changed:  Another medication with the same name was added. Make sure you understand how and when to take each.   Used for:  Recurrent cold sores   Changed by:  Destiny Cates MD        Dose:  2000 mg   Take 2 tablets (2,000 mg) by mouth 2 " times daily   Quantity:  16 tablet   Refills:  11       * valACYclovir 500 MG tablet   Commonly known as:  VALTREX   This may have changed:  Another medication with the same name was added. Make sure you understand how and when to take each.   Used for:  Twin pregnancy with fetal loss and retention of one fetus, antepartum, Prenatal care, first pregnancy, second trimester, Need for Tdap vaccination, Recurrent genital HSV (herpes simplex virus) infection   Changed by:  Anuja Jauregui MD        Take 1 tablet twice a day for 3 days and then start daily   Quantity:  90 tablet   Refills:  3       * valACYclovir 1000 mg tablet   Commonly known as:  VALTREX   This may have changed:  Another medication with the same name was added. Make sure you understand how and when to take each.   Used for:  HSV infection   Changed by:  Anuja Jauregui MD        Dose:  1000 mg   Take 1 tablet (1,000 mg) by mouth 3 times daily for 7 days   Quantity:  21 tablet   Refills:  0       * valACYclovir 1000 mg tablet   Commonly known as:  VALTREX   This may have changed:  You were already taking a medication with the same name, and this prescription was added. Make sure you understand how and when to take each.   Used for:  Prenatal care, first pregnancy, third trimester, Genital herpes affecting pregnancy in third trimester   Changed by:  Anuja Jauregui MD        Dose:  1000 mg   Take 1 tablet (1,000 mg) by mouth daily   Quantity:  60 tablet   Refills:  3       * Notice:  This list has 4 medication(s) that are the same as other medications prescribed for you. Read the directions carefully, and ask your doctor or other care provider to review them with you.         Where to get your medicines      These medications were sent to [facility] [address] [place] [address] - [address]  [address], [place] [address]     Phone:  [phone]     valACYclovir 1000 mg tablet                 Primary Care Provider Office Phone #    [facility] [phone] [address]  [facility] [address]        Thank you!     Thank you for choosing [facility]  for your care. Our goal is always to provide you with excellent care. Hearing back from our patients is one way we can continue to improve our services. Please take a few minutes to complete the written survey that you may receive in the mail after your visit with us. Thank you!             Your Updated Medication List - Protect others around you: Learn how to safely use, store and throw away your medicines at www.disposemymeds.org.          This list is accurate as of: 4/5/17  3:44 PM.  Always use your most recent med list.                   Brand Name Dispense Instructions for use    albuterol 108 (90 BASE) MCG/ACT Inhaler   Generic drug:  albuterol      Inhale 1-2 puffs into the lungs Reported on 4/5/2017       ferrous gluconate 324 (38 FE) MG tablet    FERGON    100 tablet    Take 1 tablet (324 mg) by mouth 3 times daily (with meals)       prenatal multivitamin  plus iron 27-0.8 MG Tabs per tablet      Take 1 tablet by mouth daily       * valACYclovir 1000 mg tablet    VALTREX    16 tablet    Take 2 tablets (2,000 mg) by mouth 2 times daily       * valACYclovir 500 MG tablet    VALTREX    90 tablet    Take 1 tablet twice a day for 3 days and then start daily       * valACYclovir 1000 mg tablet    VALTREX    21 tablet    Take 1 tablet (1,000 mg) by mouth 3 times daily for 7 days       * valACYclovir 1000 mg tablet    VALTREX    60 tablet    Take 1 tablet (1,000 mg) by mouth daily       * Notice:  This list has 4 medication(s) that are the same as other medications prescribed for you. Read the directions carefully, and ask your doctor or other care provider to review them with you.

## 2017-04-18 ENCOUNTER — PRENATAL OFFICE VISIT (OUTPATIENT)
Dept: OBGYN | Facility: CLINIC | Age: 32
End: 2017-04-18
Payer: COMMERCIAL

## 2017-04-18 VITALS
SYSTOLIC BLOOD PRESSURE: 119 MMHG | HEART RATE: 81 BPM | HEIGHT: 65 IN | BODY MASS INDEX: 31.65 KG/M2 | WEIGHT: 190 LBS | DIASTOLIC BLOOD PRESSURE: 69 MMHG

## 2017-04-18 DIAGNOSIS — N90.89 VULVAR IRRITATION: ICD-10-CM

## 2017-04-18 DIAGNOSIS — Z34.03 PRENATAL CARE, FIRST PREGNANCY, THIRD TRIMESTER: Primary | ICD-10-CM

## 2017-04-18 LAB
MICRO REPORT STATUS: ABNORMAL
SPECIMEN SOURCE: ABNORMAL
WET PREP SPEC: ABNORMAL

## 2017-04-18 PROCEDURE — 87210 SMEAR WET MOUNT SALINE/INK: CPT | Performed by: OBSTETRICS & GYNECOLOGY

## 2017-04-18 PROCEDURE — 99213 OFFICE O/P EST LOW 20 MIN: CPT | Performed by: OBSTETRICS & GYNECOLOGY

## 2017-04-18 RX ORDER — NYSTATIN 100000 U/G
OINTMENT TOPICAL 3 TIMES DAILY
Qty: 15 G | Refills: 1 | Status: SHIPPED | OUTPATIENT
Start: 2017-04-18 | End: 2017-05-02

## 2017-04-18 RX ORDER — FLUCONAZOLE 150 MG/1
150 TABLET ORAL ONCE
Qty: 1 TABLET | Refills: 0 | Status: SHIPPED | OUTPATIENT
Start: 2017-04-18 | End: 2017-04-18

## 2017-04-18 NOTE — NURSING NOTE
"Initial /69 (BP Location: Left arm, Patient Position: Chair, Cuff Size: Adult Large)  Pulse 81  Ht 5' 5\" (1.651 m)  Wt 190 lb (86.2 kg)  LMP 09/04/2016  BMI 31.62 kg/m2 Estimated body mass index is 31.62 kg/(m^2) as calculated from the following:    Height as of this encounter: 5' 5\" (1.651 m).    Weight as of this encounter: 190 lb (86.2 kg). .      "

## 2017-04-18 NOTE — PROGRESS NOTES
"CC: Here for routine prenatal visit @ 32w2d   HPI: + FM, no ctx, no LOF, no VB.  Still with vaginal irritation.  Unsure if it's her HSV or if she has a yeast infection. Denies new soaps or detergents.  Not shaving or waxing either.      PE: /69 (BP Location: Left arm, Patient Position: Chair, Cuff Size: Adult Large)  Pulse 81  Ht 5' 5\" (1.651 m)  Wt 190 lb (86.2 kg)  LMP 09/04/2016  BMI 31.62 kg/m2   See OB flowsheet    No obvious ulcerations of the labia minora or majora.  Moderate erythema of bilateral labia minora.  Moderate amount of white discharge.    Microscopic wet-mount exam shows scant monilia.     A/P G1 @ 32w2d normal pregnancy, vaginal irritation    1. Routine prenatal care  2. Will trial diflucan and nystatin for now.  If no response, will consider mild steroid ointment.     RTC 2 weeks.      Destiny Cates M.D.    "

## 2017-04-18 NOTE — MR AVS SNAPSHOT
After Visit Summary   4/18/2017    Gisele Meadows    MRN: [id number]           Patient Information     Date Of Birth          1985        Visit Information        Provider Department      4/18/2017 3:00 PM Destiny Cates MD [facility]        Today's Diagnoses     Prenatal care, first pregnancy, third trimester    -  1    Vulvar irritation          Care Instructions    Return in two weeks.        Follow-ups after your visit        Your next 10 appointments already scheduled     May 02, 2017  3:45 PM CDT   [facility]    [address] [facility] [address]   [phone]            May 17, 2017  4:00 PM CDT   ESTABL[facility] PRENATAL with [facility]    [address] [facility] [address] [facility] [address]   [phone]            May 24, 2017  3:30 PM CDT   ESTABLISHED PRENATAL with [facility]gui [facility]    [address] [facility] [address] [address]   [phone]            May 30, 2017  3:30 PM CDT   ESTABL[facility] PRENATAL [facility]    [address] [facility] [address]   [phone]            Jun 07, 2017  3:30 PM CDT   ESTABL[facility] PRENATAL with [facility] C[facility]   [facility]    [address] [facility] [address]   [phone]              Who to contact     If you have questions or need follow up information about today's clinic visit or your schedule please contact [facility] directly at [phone].  Normal or non-critical lab and imaging results will be communicated to you by MyChart, letter or phone within 4 business days after the clinic has received the results. If you do not hear from us within 7  "days, please contact the clinic through [employer] or phone. If you have a critical or abnormal lab result, we will notify you by phone as soon as possible.  Submit refill requests through [employer] or call your pharmacy and they will forward the refill request to us. Please allow 3 business days for your refill to be completed.          Additional Information About Your Visit        [employer] Information     [employer] gives you secure access to your electronic health record. If you see a primary care provider, you can also send messages to your care team and make appointments. If you have questions, please call your primary care clinic.  If you do not have a primary care provider, please call [phone] and they will assist you.        Care EveryWhere ID     This is your Care EveryWhere ID. This could be used by other organizations to access your [place] medical records  [id number]        Your Vitals Were     Pulse Height Last Period BMI (Body Mass Index)          81 5' 5\" (1.651 m) 09/04/2016 31.62 kg/m2         Blood Pressure from Last 3 Encounters:   04/18/17 119/69   04/05/17 125/76   03/22/17 117/72    Weight from Last 3 Encounters:   04/18/17 190 lb (86.2 kg)   04/05/17 187 lb (84.8 kg)   03/22/17 185 lb 6.4 oz (84.1 kg)              We Performed the Following     Wet prep          Today's Medication Changes          These changes are accurate as of: 4/18/17  3:29 PM.  If you have any questions, ask your nurse or doctor.               Start taking these medicines.        Dose/Directions    fluconazole 150 MG tablet   Commonly known as:  DIFLUCAN   Used for:  Vulvar irritation   Started by:  Destiny Cates MD        Dose:  150 mg   Take 1 tablet (150 mg) by mouth once for 1 dose   Quantity:  1 tablet   Refills:  0       nystatin ointment   Commonly known as:  MYCOSTATIN   Used for:  Vulvar irritation   Started by:  Destiny Cates MD        Apply topically 3 times daily for 14 days   Quantity:  15 g "   Refills:  1            Where to get your medicines      These medications were sent to [id number]  [place], [place] - [address]  [address] [place] [address]     Phone:  [phone]     fluconazole 150 MG tablet    nystatin ointment                Primary Care Provider Office Phone #    [facility] [phone] [address] [facility] [address]        Thank you!     Thank you for choosing [facility]  for your care. Our goal is always to provide you with excellent care. Hearing back from our patients is one way we can continue to improve our services. Please take a few minutes to complete the written survey that you may receive in the mail after your visit with us. Thank you!             Your Updated Medication List - Protect others around you: Learn how to safely use, store and throw away your medicines at www.disposemymeds.org.          This list is accurate as of: 4/18/17  3:29 PM.  Always use your most recent med list.                   Brand Name Dispense Instructions for use    albuterol 108 (90 BASE) MCG/ACT Inhaler   Generic drug:  albuterol      Inhale 1-2 puffs into the lungs Reported on 4/5/2017       ferrous gluconate 324 (38 FE) MG tablet    FERGON    100 tablet    Take 1 tablet (324 mg) by mouth 3 times daily (with meals)       fluconazole 150 MG tablet    DIFLUCAN    1 tablet    Take 1 tablet (150 mg) by mouth once for 1 dose       nystatin ointment    MYCOSTATIN    15 g    Apply topically 3 times daily for 14 days       prenatal multivitamin  plus iron 27-0.8 MG Tabs per tablet      Take 1 tablet by mouth daily       * valACYclovir 1000 mg tablet    VALTREX    16 tablet    Take 2 tablets (2,000 mg) by mouth 2 times daily       * valACYclovir 500 MG tablet    VALTREX    90 tablet    Take 1 tablet twice a day for 3 days and then start daily       * valACYclovir 1000 mg tablet    VALTREX    60 tablet    Take 1 tablet (1,000  mg) by mouth daily       * Notice:  This list has 3 medication(s) that are the same as other medications prescribed for you. Read the directions carefully, and ask your doctor or other care provider to review them with you.

## 2017-04-19 ENCOUNTER — TRANSFERRED RECORDS (OUTPATIENT)
Dept: HEALTH INFORMATION MANAGEMENT | Facility: CLINIC | Age: 32
End: 2017-04-19

## 2017-05-02 ENCOUNTER — PRENATAL OFFICE VISIT (OUTPATIENT)
Dept: OBGYN | Facility: CLINIC | Age: 32
End: 2017-05-02
Payer: COMMERCIAL

## 2017-05-02 VITALS
HEIGHT: 65 IN | HEART RATE: 75 BPM | SYSTOLIC BLOOD PRESSURE: 110 MMHG | BODY MASS INDEX: 31.82 KG/M2 | WEIGHT: 191 LBS | DIASTOLIC BLOOD PRESSURE: 71 MMHG

## 2017-05-02 DIAGNOSIS — A60.00 RECURRENT GENITAL HSV (HERPES SIMPLEX VIRUS) INFECTION: ICD-10-CM

## 2017-05-02 DIAGNOSIS — Z34.03 PRENATAL CARE, FIRST PREGNANCY, THIRD TRIMESTER: Primary | ICD-10-CM

## 2017-05-02 PROCEDURE — 99207 ZZC PRENATAL VISIT: CPT | Performed by: OBSTETRICS & GYNECOLOGY

## 2017-05-02 NOTE — MR AVS SNAPSHOT
After Visit Summary   5/2/2017    Gisele Meadows    MRN: [id number]           Patient Information     Date Of Birth          1985        Visit Information        Provider Department      5/2/2017 3:45 PM Destiny Cates MD [facility]        Today's Diagnoses     Prenatal care, first pregnancy, third trimester    -  1    Recurrent genital HSV (herpes simplex virus) infection        Twin pregnancy with fetal loss and retention of one fetus, antepartum           Follow-ups after your visit        Your next 10 appointments already scheduled     May 17, 2017  4:00 PM CDT   ESTABLISHED PRENATAL with Destiny Cates MD   [facility]    [address] [facility] [address]   [phone]            May 24, 2017  3:30 PM CDT   ESTABLISHED PRENATAL with Anuja Jauregui MD   [facility]    [address] [facility] [address]   [phone]            May 30, 2017  3:30 PM CDT   ESTABLISHED PRENATAL with Destiny Cates MD   [facility]    [address] [facility] [address]   [phone]            Jun 07, 2017  3:30 PM CDT   ESTABLISHED PRENATAL with Destiny Cates MD   [facility]    [address] [facility] [facility] [address]   [phone]              Who to contact     If you have questions or need follow up information about today's clinic visit or your schedule please contact [facility] directly at [phone].  Normal or non-critical lab and imaging results will be communicated to you by MyChart, letter or phone within 4 business days after the clinic has received the results. If you do not hear from us within 7 days, please contact the clinic through MyChart or phone. If you have a critical or abnormal lab result, we will notify you by phone as soon as  "possible.  Submit refill requests through [employer] or call your pharmacy and they will forward the refill request to us. Please allow 3 business days for your refill to be completed.          Additional Information About Your Visit        [employer] Information     [employer] gives you secure access to your electronic health record. If you see a primary care provider, you can also send messages to your care team and make appointments. If you have questions, please call your primary care clinic.  If you do not have a primary care provider, please call [phone] and they will assist you.        Care EveryWhere ID     This is your Care EveryWhere ID. This could be used by other organizations to access your [place] medical records  [id number]        Your Vitals Were     Pulse Height Last Period BMI (Body Mass Index)          75 5' 5\" (1.651 m) 09/04/2016 31.78 kg/m2         Blood Pressure from Last 3 Encounters:   05/02/17 110/71   04/18/17 119/69   04/05/17 125/76    Weight from Last 3 Encounters:   05/02/17 191 lb (86.6 kg)   04/18/17 190 lb (86.2 kg)   04/05/17 187 lb (84.8 kg)              Today, you had the following     No orders found for display       Primary Care Provider Office Phone #    [facility] [phone] [address] [facility] [address]        Thank you!     Thank you for choosing [facility]  for your care. Our goal is always to provide you with excellent care. Hearing back from our patients is one way we can continue to improve our services. Please take a few minutes to complete the written survey that you may receive in the mail after your visit with us. Thank you!             Your Updated Medication List - Protect others around you: Learn how to safely use, store and throw away your medicines at www.disposemymeds.org.          This list is accurate as of: 5/2/17  4:04 PM.  Always use your most recent med list.                   Brand Name Dispense " Instructions for use    albuterol 108 (90 BASE) MCG/ACT Inhaler   Generic drug:  albuterol      Inhale 1-2 puffs into the lungs Reported on 4/5/2017       ferrous gluconate 324 (38 FE) MG tablet    FERGON    100 tablet    Take 1 tablet (324 mg) by mouth 3 times daily (with meals)       nystatin ointment    MYCOSTATIN    15 g    Apply topically 3 times daily for 14 days       prenatal multivitamin  plus iron 27-0.8 MG Tabs per tablet      Take 1 tablet by mouth daily       * valACYclovir 1000 mg tablet    VALTREX    16 tablet    Take 2 tablets (2,000 mg) by mouth 2 times daily       * valACYclovir 500 MG tablet    VALTREX    90 tablet    Take 1 tablet twice a day for 3 days and then start daily       * valACYclovir 1000 mg tablet    VALTREX    60 tablet    Take 1 tablet (1,000 mg) by mouth daily       * Notice:  This list has 3 medication(s) that are the same as other medications prescribed for you. Read the directions carefully, and ask your doctor or other care provider to review them with you.

## 2017-05-02 NOTE — NURSING NOTE
"Initial /71 (BP Location: Right arm, Patient Position: Chair, Cuff Size: Adult Large)  Pulse 75  Ht 5' 5\" (1.651 m)  Wt 191 lb (86.6 kg)  LMP 09/04/2016  BMI 31.78 kg/m2 Estimated body mass index is 31.78 kg/(m^2) as calculated from the following:    Height as of this encounter: 5' 5\" (1.651 m).    Weight as of this encounter: 191 lb (86.6 kg). .      "

## 2017-05-02 NOTE — PROGRESS NOTES
"CC: Here for routine prenatal visit @ 34w2d   HPI: + FM, no ctx, no LOF, no VB.  Having heartburn     PE: /71 (BP Location: Right arm, Patient Position: Chair, Cuff Size: Adult Large)  Pulse 75  Ht 5' 5\" (1.651 m)  Wt 191 lb (86.6 kg)  LMP 09/04/2016  BMI 31.78 kg/m2   See OB flowsheet    A/P G1 @ 34w2d normal pregnancy, GERD    1. Routine prenatal care  2. GERD: discussed over the counter remedies    RTC 2 weeks.      Destiny Cates M.D.    "

## 2017-05-17 ENCOUNTER — PRENATAL OFFICE VISIT (OUTPATIENT)
Dept: OBGYN | Facility: CLINIC | Age: 32
End: 2017-05-17
Payer: COMMERCIAL

## 2017-05-17 VITALS
BODY MASS INDEX: 32.32 KG/M2 | SYSTOLIC BLOOD PRESSURE: 124 MMHG | WEIGHT: 194 LBS | HEART RATE: 73 BPM | HEIGHT: 65 IN | DIASTOLIC BLOOD PRESSURE: 80 MMHG

## 2017-05-17 DIAGNOSIS — Z3A.36 36 WEEKS GESTATION OF PREGNANCY: Primary | ICD-10-CM

## 2017-05-17 PROCEDURE — 99207 ZZC PRENATAL VISIT: CPT | Performed by: OBSTETRICS & GYNECOLOGY

## 2017-05-17 PROCEDURE — 87653 STREP B DNA AMP PROBE: CPT | Performed by: OBSTETRICS & GYNECOLOGY

## 2017-05-17 NOTE — MR AVS SNAPSHOT
After Visit Summary   5/17/2017    Gisele Meadows    MRN: [id number]           Patient Information     Date Of Birth          1985        Visit Information        Provider Department      5/17/2017 4:00 PM Destiny Cates MD [facility]        Today's Diagnoses     36 weeks gestation of pregnancy    -  1       Follow-ups after your visit        Your next 10 appointments already scheduled     May 24, 2017  3:30 PM CDT   ESTABLISHED PRENATAL with Anuja Jauregui MD   [facility]    [address] [address]   [phone]            May 30, 2017  3:30 PM CDT   ESTABLISHED PRENATAL with Destiny Cates MD   [facility] ([facility]    [address] [address]   [phone]            Jun 07, 2017  3:30 PM CDT   ESTABLISHED PRENATAL with Destiny Cates MD   [facility]    [address] [facility] [address]   [phone]              Who to contact     If you have questions or need follow up information about today's clinic visit or your schedule please contact [facility] directly at [phone].  Normal or non-critical lab and imaging results will be communicated to you by MyChart, letter or phone within 4 business days after the clinic has received the results. If you do not hear from us within 7 days, please contact the clinic through MyChart or phone. If you have a critical or abnormal lab result, we will notify you by phone as soon as possible.  Submit refill requests through [employer] or call your pharmacy and they will forward the refill request to us. Please allow 3 business days for your refill to be completed.          Additional Information About Your Visit        [employer] Information     [employer] gives you secure access to your electronic health record. If you see a primary care  "provider, you can also send messages to your care team and make appointments. If you have questions, please call your primary care clinic.  If you do not have a primary care provider, please call [phone] and they will assist you.        Care EveryWhere ID     This is your Care EveryWhere ID. This could be used by other organizations to access your [place] medical records  [id number]        Your Vitals Were     Pulse Height Last Period BMI (Body Mass Index)          73 5' 5\" (1.651 m) 09/04/2016 32.28 kg/m2         Blood Pressure from Last 3 Encounters:   05/17/17 124/80   05/02/17 110/71   04/18/17 119/69    Weight from Last 3 Encounters:   05/17/17 194 lb (88 kg)   05/02/17 191 lb (86.6 kg)   04/18/17 190 lb (86.2 kg)              We Performed the Following     Strep, Group B by PCR          Today's Medication Changes          These changes are accurate as of: 5/17/17  4:26 PM.  If you have any questions, ask your nurse or doctor.               These medicines have changed or have updated prescriptions.        Dose/Directions    valACYclovir 1000 mg tablet   Commonly known as:  VALTREX   This may have changed:  Another medication with the same name was removed. Continue taking this medication, and follow the directions you see here.   Used for:  Prenatal care, first pregnancy, third trimester, Genital herpes affecting pregnancy in third trimester   Changed by:  Anuja Jauregui MD        Dose:  1000 mg   Take 1 tablet (1,000 mg) by mouth daily   Quantity:  60 tablet   Refills:  3                Primary Care Provider Office Phone #    [facility] [phone] [address] [facility] [address]        Thank you!     Thank you for choosing [facility]  for your care. Our goal is always to provide you with excellent care. Hearing back from our patients is one way we can continue to improve our services. Please take a few minutes to complete the " written survey that you may receive in the mail after your visit with us. Thank you!             Your Updated Medication List - Protect others around you: Learn how to safely use, store and throw away your medicines at www.disposemymeds.org.          This list is accurate as of: 5/17/17  4:26 PM.  Always use your most recent med list.                   Brand Name Dispense Instructions for use    albuterol 108 (90 BASE) MCG/ACT Inhaler   Generic drug:  albuterol      Inhale 1-2 puffs into the lungs Reported on 4/5/2017       ferrous gluconate 324 (38 FE) MG tablet    FERGON    100 tablet    Take 1 tablet (324 mg) by mouth 3 times daily (with meals)       prenatal multivitamin  plus iron 27-0.8 MG Tabs per tablet      Take 1 tablet by mouth daily       valACYclovir 1000 mg tablet    VALTREX    60 tablet    Take 1 tablet (1,000 mg) by mouth daily

## 2017-05-17 NOTE — PROGRESS NOTES
"CC: Here for routine prenatal visit @ 36w3d   HPI: + FM, no ctx, no LOF, no VB.  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  Has to drink lots of water to deal with the heat, which makes her go to the bathroom frequently as well.  Would like to consider starting her maternity leave early    PE: /90 (BP Location: Left arm, Patient Position: Chair, Cuff Size: Adult Large)  Pulse 79  Ht 5' 5\" (1.651 m)  Wt 194 lb (88 kg)  LMP 2016  BMI 32.28 kg/m2   See OB flowsheet    GBS done    A/P G1 @ 36w3d normal pregnancy    1. Routine prenatal care.  Discussed with Gisele Meadows, the following; indications; the agents and methods of labor augmentation, including risks, benefits, and alternative approaches; and the possible need for  birth. EFW is AGA.    The Labor Induction:what you need to know information sheet was made available to her. Questions and concerns were addressed and patient agrees to above if necessary during the course of her labor.    2. Heat issues: discussed concern for overheating in pregnancy and discussed potential strategies.  If unable to tolerate, then will consider starting her leave early.    RTC 1 week    Destiny Cates M.D.    "

## 2017-05-17 NOTE — NURSING NOTE
"Initial /90 (BP Location: Left arm, Patient Position: Chair, Cuff Size: Adult Large)  Pulse 79  Ht 5' 5\" (1.651 m)  Wt 194 lb (88 kg)  LMP 09/04/2016  BMI 32.28 kg/m2 Estimated body mass index is 32.28 kg/(m^2) as calculated from the following:    Height as of this encounter: 5' 5\" (1.651 m).    Weight as of this encounter: 194 lb (88 kg). .      "

## 2017-05-18 PROBLEM — Z36.89 ENCOUNTER FOR TRIAGE IN PREGNANT PATIENT: Status: ACTIVE | Noted: 2017-05-18

## 2017-05-18 LAB
GP B STREP DNA SPEC QL NAA+PROBE: NORMAL
SPECIMEN SOURCE: NORMAL

## 2017-05-23 ENCOUNTER — TELEPHONE (OUTPATIENT)
Dept: OBGYN | Facility: CLINIC | Age: 32
End: 2017-05-23

## 2017-05-23 NOTE — TELEPHONE ENCOUNTER
Spoke with patient on the phone.  Patient reports she had chicken pox as a child.  Patient reassured.  Patient has clinic appointment tomorrow.    Mandi Deng   [facility]  RN

## 2017-05-23 NOTE — TELEPHONE ENCOUNTER
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 YES    Call taken on 5/23/2017 at 8:56 AM by Kimberly Ledesma

## 2017-05-24 ENCOUNTER — PRENATAL OFFICE VISIT (OUTPATIENT)
Dept: OBGYN | Facility: CLINIC | Age: 32
End: 2017-05-24
Payer: COMMERCIAL

## 2017-05-24 VITALS
SYSTOLIC BLOOD PRESSURE: 135 MMHG | BODY MASS INDEX: 32.92 KG/M2 | WEIGHT: 197.6 LBS | DIASTOLIC BLOOD PRESSURE: 74 MMHG | HEIGHT: 65 IN | HEART RATE: 93 BPM

## 2017-05-24 DIAGNOSIS — Z34.03 PRENATAL CARE, FIRST PREGNANCY, THIRD TRIMESTER: Primary | ICD-10-CM

## 2017-05-24 PROCEDURE — 99207 ZZC PRENATAL VISIT: CPT | Performed by: OBSTETRICS & GYNECOLOGY

## 2017-05-24 NOTE — PROGRESS NOTES
"CC: prenatal  S:  No ctx/lof/vb/dc.  Good fm.  No headaches/visual changes. On valtrex and no outbreaks.  /74 (BP Location: Left arm, Patient Position: Chair, Cuff Size: Adult Regular)  Pulse 93  Ht 5' 5\" (1.651 m)  Wt 197 lb 9.6 oz (89.6 kg)  LMP 09/04/2016  BMI 32.88 kg/m2  A/P routine precautions  RTC in 1 weeks  Anuja Jauregui MD    "

## 2017-05-24 NOTE — MR AVS SNAPSHOT
After Visit Summary   5/24/2017    Gisele Meadows    MRN: [id number]           Patient Information     Date Of Birth          1985        Visit Information        Provider Department      5/24/2017 3:30 PM Anuja Jauregui MD [facility]        Today's Diagnoses     Prenatal care, first pregnancy, third trimester    -  1    Twin pregnancy with fetal loss and retention of one fetus, antepartum           Follow-ups after your visit        Your next 10 appointments already scheduled     May 30, 2017  3:30 PM CDT   ESTABLISHED PRENATAL with Destiny Cates MD   [facility]    [address] [address]   [phone]            Jun 07, 2017  3:30 PM CDT   ESTABLISHED PRENATAL with Destiny Cates MD   [facility]    [address] [facility] [address]   [phone]              Who to contact     If you have questions or need follow up information about today's clinic visit or your schedule please contact [facility] directly at [phone].  Normal or non-critical lab and imaging results will be communicated to you by MyChart, letter or phone within 4 business days after the clinic has received the results. If you do not hear from us within 7 days, please contact the clinic through [employer] or phone. If you have a critical or abnormal lab result, we will notify you by phone as soon as possible.  Submit refill requests through [employer] or call your pharmacy and they will forward the refill request to us. Please allow 3 business days for your refill to be completed.          Additional Information About Your Visit        MyChart Information     [employer] gives you secure access to your electronic health record. If you see a primary care provider, you can also send messages to your care team and make appointments. If you have questions, please call  "your primary care clinic.  If you do not have a primary care provider, please call [phone] and they will assist you.        Care EveryWhere ID     This is your Care EveryWhere ID. This could be used by other organizations to access your [place] medical records  [id number]        Your Vitals Were     Pulse Height Last Period BMI (Body Mass Index)          93 5' 5\" (1.651 m) 09/04/2016 32.88 kg/m2         Blood Pressure from Last 3 Encounters:   05/24/17 135/74   05/18/17 125/74   05/17/17 124/80    Weight from Last 3 Encounters:   05/24/17 197 lb 9.6 oz (89.6 kg)   05/18/17 194 lb (88 kg)   05/17/17 194 lb (88 kg)              Today, you had the following     No orders found for display       Primary Care Provider Office Phone #    [facility] [phone] [address] [address]        Thank you!     Thank you for choosing [facility]  for your care. Our goal is always to provide you with excellent care. Hearing back from our patients is one way we can continue to improve our services. Please take a few minutes to complete the written survey that you may receive in the mail after your visit with us. Thank you!             Your Updated Medication List - Protect others around you: Learn how to safely use, store and throw away your medicines at www.disposemymeds.org.          This list is accurate as of: 5/24/17  3:45 PM.  Always use your most recent med list.                   Brand Name Dispense Instructions for use    albuterol 108 (90 BASE) MCG/ACT Inhaler   Generic drug:  albuterol      Inhale 1-2 puffs into the lungs Reported on 4/5/2017       ferrous gluconate 324 (38 FE) MG tablet    FERGON    100 tablet    Take 1 tablet (324 mg) by mouth 3 times daily (with meals)       prenatal multivitamin  plus iron 27-0.8 MG Tabs per tablet      Take 1 tablet by mouth daily       valACYclovir 1000 mg tablet    VALTREX    60 tablet    Take 1 tablet (1,000 " mg) by mouth daily

## 2017-05-30 ENCOUNTER — PRENATAL OFFICE VISIT (OUTPATIENT)
Dept: OBGYN | Facility: CLINIC | Age: 32
End: 2017-05-30
Payer: COMMERCIAL

## 2017-05-30 VITALS
HEIGHT: 65 IN | BODY MASS INDEX: 32.65 KG/M2 | DIASTOLIC BLOOD PRESSURE: 90 MMHG | SYSTOLIC BLOOD PRESSURE: 125 MMHG | HEART RATE: 95 BPM | WEIGHT: 196 LBS

## 2017-05-30 DIAGNOSIS — Z34.03 PRENATAL CARE, FIRST PREGNANCY, THIRD TRIMESTER: Primary | ICD-10-CM

## 2017-05-30 DIAGNOSIS — A60.00 RECURRENT GENITAL HSV (HERPES SIMPLEX VIRUS) INFECTION: ICD-10-CM

## 2017-05-30 PROBLEM — Z36.89 ENCOUNTER FOR TRIAGE IN PREGNANT PATIENT: Status: RESOLVED | Noted: 2017-05-18 | Resolved: 2017-05-30

## 2017-05-30 PROCEDURE — 99207 ZZC PRENATAL VISIT: CPT | Performed by: OBSTETRICS & GYNECOLOGY

## 2017-05-30 NOTE — MR AVS SNAPSHOT
After Visit Summary   5/30/2017    Gisele Meadows    MRN: [id number]           Patient Information     Date Of Birth          1985        Visit Information        Provider Department      5/30/2017 3:30 PM Destiny Cates MD [facility]        Today's Diagnoses     Prenatal care, first pregnancy, third trimester    -  1    Recurrent genital HSV (herpes simplex virus) infection        Twin pregnancy with fetal loss and retention of one fetus, antepartum           Follow-ups after your visit        Your next 10 appointments already scheduled     Jun 07, 2017  3:30 PM CDT   ESTABLISHED PRENATAL with Destiny Cates MD   [facility] ([facility]    [address] [address]   [phone]            Jun 14, 2017  2:30 PM CDT   ESTABLISHED PRENATAL with Mehrdad Valenzuela MD   [facility] ([facility]    [address] [address]   [phone]              Who to contact     If you have questions or need follow up information about today's clinic visit or your schedule please contact [facility] directly at [phone].  Normal or non-critical lab and imaging results will be communicated to you by MyChart, letter or phone within 4 business days after the clinic has received the results. If you do not hear from us within 7 days, please contact the clinic through Androcialhart or phone. If you have a critical or abnormal lab result, we will notify you by phone as soon as possible.  Submit refill requests through [employer]) or call your pharmacy and they will forward the refill request to us. Please allow 3 business days for your refill to be completed.          Additional Information About Your Visit        MyChart Information     [employer] gives you secure access to your electronic health record. If you see a primary care provider, you can also send messages to your care team and make  "appointments. If you have questions, please call your primary care clinic.  If you do not have a primary care provider, please call [phone] and they will assist you.        Care EveryWhere ID     This is your Care EveryWhere ID. This could be used by other organizations to access your [place] medical records  [id number]        Your Vitals Were     Pulse Height Last Period BMI (Body Mass Index)          95 5' 5\" (1.651 m) 09/04/2016 32.62 kg/m2         Blood Pressure from Last 3 Encounters:   05/30/17 125/90   05/24/17 135/74   05/18/17 125/74    Weight from Last 3 Encounters:   05/30/17 196 lb (88.9 kg)   05/24/17 197 lb 9.6 oz (89.6 kg)   05/18/17 194 lb (88 kg)              Today, you had the following     No orders found for display       Primary Care Provider Office Phone #    [facility] [phone] [address] [address]        Thank you!     Thank you for choosing [facility]  for your care. Our goal is always to provide you with excellent care. Hearing back from our patients is one way we can continue to improve our services. Please take a few minutes to complete the written survey that you may receive in the mail after your visit with us. Thank you!             Your Updated Medication List - Protect others around you: Learn how to safely use, store and throw away your medicines at www.disposemymeds.org.          This list is accurate as of: 5/30/17  4:03 PM.  Always use your most recent med list.                   Brand Name Dispense Instructions for use    albuterol 108 (90 BASE) MCG/ACT Inhaler   Generic drug:  albuterol      Inhale 1-2 puffs into the lungs Reported on 4/5/2017       ferrous gluconate 324 (38 FE) MG tablet    FERGON    100 tablet    Take 1 tablet (324 mg) by mouth 3 times daily (with meals)       prenatal multivitamin  plus iron 27-0.8 MG Tabs per tablet      Take 1 tablet by mouth daily       valACYclovir 1000 mg " tablet    VALTREX    60 tablet    Take 1 tablet (1,000 mg) by mouth daily

## 2017-05-30 NOTE — PROGRESS NOTES
"CC: Here for routine prenatal visit @ 38w2d   HPI: + FM, no ctx, no LOF, no VB.  No new complaints.     PE: /90 (BP Location: Left arm, Patient Position: Chair, Cuff Size: Adult Large)  Pulse 95  Ht 5' 5\" (1.651 m)  Wt 196 lb (88.9 kg)  LMP 09/04/2016  BMI 32.62 kg/m2   See OB flowsheet    A/P G1 @ 38w2d normal pregnancy    1. Routine prenatal care.  Reviewed labor precautions.    RTC 1 week    Destiny Cates M.D.    "

## 2017-05-30 NOTE — NURSING NOTE
"Initial /90 (BP Location: Left arm, Patient Position: Chair, Cuff Size: Adult Large)  Pulse 95  Ht 5' 5\" (1.651 m)  Wt 196 lb (88.9 kg)  LMP 09/04/2016  BMI 32.62 kg/m2 Estimated body mass index is 32.62 kg/(m^2) as calculated from the following:    Height as of this encounter: 5' 5\" (1.651 m).    Weight as of this encounter: 196 lb (88.9 kg). .      "

## 2017-06-07 ENCOUNTER — PRENATAL OFFICE VISIT (OUTPATIENT)
Dept: OBGYN | Facility: CLINIC | Age: 32
End: 2017-06-07
Payer: COMMERCIAL

## 2017-06-07 VITALS
HEIGHT: 65 IN | DIASTOLIC BLOOD PRESSURE: 81 MMHG | WEIGHT: 200 LBS | BODY MASS INDEX: 33.32 KG/M2 | HEART RATE: 88 BPM | SYSTOLIC BLOOD PRESSURE: 125 MMHG

## 2017-06-07 DIAGNOSIS — A60.00 RECURRENT GENITAL HSV (HERPES SIMPLEX VIRUS) INFECTION: ICD-10-CM

## 2017-06-07 DIAGNOSIS — Z34.03 PRENATAL CARE, FIRST PREGNANCY, THIRD TRIMESTER: Primary | ICD-10-CM

## 2017-06-07 DIAGNOSIS — B37.31 YEAST INFECTION OF THE VAGINA: ICD-10-CM

## 2017-06-07 PROCEDURE — 99207 ZZC PRENATAL VISIT: CPT | Performed by: OBSTETRICS & GYNECOLOGY

## 2017-06-07 RX ORDER — FLUCONAZOLE 150 MG/1
150 TABLET ORAL ONCE
Qty: 1 TABLET | Refills: 1 | Status: SHIPPED | OUTPATIENT
Start: 2017-06-07 | End: 2017-06-08

## 2017-06-07 NOTE — PROGRESS NOTES
"CC: Here for routine prenatal visit @ 39w3d   HPI: + FM, no ctx, no LOF, no VB.  More vaginal itching.     PE: /81 (BP Location: Left arm, Patient Position: Chair, Cuff Size: Adult Large)  Pulse 88  Ht 5' 5\" (1.651 m)  Wt 200 lb (90.7 kg)  LMP 09/04/2016  BMI 33.28 kg/m2   See OB flowsheet    Cervical feels a little stenotic (hx of LEEP).  Discharge consistent with monilia    A/P G1 @ 39w3d normal pregnancy    1. Routine prenatal care.  HSV prophylaxis.  Discussed post-term testing and indications for IOL  2. Recurrent yeast infection: diflucan ordered    RTC 1 week    Destiny Cates M.D.    "

## 2017-06-07 NOTE — NURSING NOTE
"Initial /81 (BP Location: Left arm, Patient Position: Chair, Cuff Size: Adult Large)  Pulse 88  Ht 5' 5\" (1.651 m)  Wt 200 lb (90.7 kg)  LMP 09/04/2016  BMI 33.28 kg/m2 Estimated body mass index is 33.28 kg/(m^2) as calculated from the following:    Height as of this encounter: 5' 5\" (1.651 m).    Weight as of this encounter: 200 lb (90.7 kg). .      "

## 2017-06-07 NOTE — MR AVS SNAPSHOT
After Visit Summary   6/7/2017    Gisele Meadows    MRN: [id number]           Patient Information     Date Of Birth          1985        Visit Information        Provider Department      6/7/2017 3:30 PM Destiny Cates MD [facility]        Today's Diagnoses     Prenatal care, first pregnancy, third trimester    -  1    Recurrent genital HSV (herpes simplex virus) infection        Twin pregnancy with fetal loss and retention of one fetus, antepartum        Yeast infection of the vagina           Follow-ups after your visit        Your next 10 appointments already scheduled     Jun 14, 2017  2:30 PM CDT   ESTABLISHED PRENATAL with Mehrdad Valenzuela MD   [facility] ([facility]    [address] [address] [phone]              Who to contact     If you have questions or need follow up information about today's clinic visit or your schedule please contact [facility] directly at [phone].  Normal or non-critical lab and imaging results will be communicated to you by Cloze[employer], letter or phone within 4 business days after the clinic has received the results. If you do not hear from us within 7 days, please contact the clinic through [employer] or phone. If you have a critical or abnormal lab result, we will notify you by phone as soon as possible.  Submit refill requests through [employer] or call your pharmacy and they will forward the refill request to us. Please allow 3 business days for your refill to be completed.          Additional Information About Your Visit        MyChart Information     [employer] gives you secure access to your electronic health record. If you see a primary care provider, you can also send messages to your care team and make appointments. If you have questions, please call your primary care clinic.  If you do not have a primary care provider, please call [phone] and they will assist  "you.        Care EveryWhere ID     This is your Care EveryWhere ID. This could be used by other organizations to access your [place] medical records  [id number]        Your Vitals Were     Pulse Height Last Period BMI (Body Mass Index)          88 5' 5\" (1.651 m) 09/04/2016 33.28 kg/m2         Blood Pressure from Last 3 Encounters:   06/07/17 125/81   05/30/17 125/90   05/24/17 135/74    Weight from Last 3 Encounters:   06/07/17 200 lb (90.7 kg)   05/30/17 196 lb (88.9 kg)   05/24/17 197 lb 9.6 oz (89.6 kg)              Today, you had the following     No orders found for display         Today's Medication Changes          These changes are accurate as of: 6/7/17  4:04 PM.  If you have any questions, ask your nurse or doctor.               Start taking these medicines.        Dose/Directions    fluconazole 150 MG tablet   Commonly known as:  DIFLUCAN   Used for:  Yeast infection of the vagina   Started by:  Destiny Cates MD        Dose:  150 mg   Take 1 tablet (150 mg) by mouth once for 1 dose   Quantity:  1 tablet   Refills:  1            Where to get your medicines      These medications were sent to [facility] [address] [place] [address] [address]  [address], [place] [address]     Phone:  [phone]     fluconazole 150 MG tablet                Primary Care Provider Office Phone #    [facility] [phone] [address] [facility] [address]        Thank you!     Thank you for choosing [facility]  for your care. Our goal is always to provide you with excellent care. Hearing back from our patients is one way we can continue to improve our services. Please take a few minutes to complete the written survey that you may receive in the mail after your visit with us. Thank you!             Your Updated Medication List - Protect others around you: Learn how to safely use, store and throw away your medicines at www.disposemymeds.org.        "   This list is accurate as of: 6/7/17  4:04 PM.  Always use your most recent med list.                   Brand Name Dispense Instructions for use    albuterol 108 (90 BASE) MCG/ACT Inhaler   Generic drug:  albuterol      Inhale 1-2 puffs into the lungs Reported on 4/5/2017       ferrous gluconate 324 (38 FE) MG tablet    FERGON    100 tablet    Take 1 tablet (324 mg) by mouth 3 times daily (with meals)       fluconazole 150 MG tablet    DIFLUCAN    1 tablet    Take 1 tablet (150 mg) by mouth once for 1 dose       prenatal multivitamin  plus iron 27-0.8 MG Tabs per tablet      Take 1 tablet by mouth daily       valACYclovir 1000 mg tablet    VALTREX    60 tablet    Take 1 tablet (1,000 mg) by mouth daily

## 2017-06-08 ENCOUNTER — ANESTHESIA EVENT (OUTPATIENT)
Dept: OBGYN | Facility: CLINIC | Age: 32
End: 2017-06-08
Payer: COMMERCIAL

## 2017-06-08 ENCOUNTER — HOSPITAL ENCOUNTER (INPATIENT)
Facility: CLINIC | Age: 32
LOS: 2 days | Discharge: HOME | End: 2017-06-10
Attending: OBSTETRICS & GYNECOLOGY | Admitting: OBSTETRICS & GYNECOLOGY
Payer: COMMERCIAL

## 2017-06-08 ENCOUNTER — ANESTHESIA (OUTPATIENT)
Dept: OBGYN | Facility: CLINIC | Age: 32
End: 2017-06-08
Payer: COMMERCIAL

## 2017-06-08 PROBLEM — Z34.00 SUPERVISION OF NORMAL FIRST PREGNANCY: Status: ACTIVE | Noted: 2017-06-08

## 2017-06-08 LAB
ABO + RH BLD: NORMAL
ABO + RH BLD: NORMAL
SPECIMEN EXP DATE BLD: NORMAL

## 2017-06-08 PROCEDURE — 00HU33Z INSERTION OF INFUSION DEVICE INTO SPINAL CANAL, PERCUTANEOUS APPROACH: ICD-10-PCS | Performed by: OBSTETRICS & GYNECOLOGY

## 2017-06-08 PROCEDURE — 86900 BLOOD TYPING SEROLOGIC ABO: CPT | Performed by: OBSTETRICS & GYNECOLOGY

## 2017-06-08 PROCEDURE — 25000125 ZZHC RX 250: Performed by: OBSTETRICS & GYNECOLOGY

## 2017-06-08 PROCEDURE — 86901 BLOOD TYPING SEROLOGIC RH(D): CPT | Performed by: OBSTETRICS & GYNECOLOGY

## 2017-06-08 PROCEDURE — 59400 OBSTETRICAL CARE: CPT | Performed by: OBSTETRICS & GYNECOLOGY

## 2017-06-08 PROCEDURE — 25000125 ZZHC RX 250: Performed by: NURSE ANESTHETIST, CERTIFIED REGISTERED

## 2017-06-08 PROCEDURE — 40000671 ZZH STATISTIC ANESTHESIA CASE

## 2017-06-08 PROCEDURE — 86780 TREPONEMA PALLIDUM: CPT | Performed by: OBSTETRICS & GYNECOLOGY

## 2017-06-08 PROCEDURE — 12000027 ZZH R&B OB

## 2017-06-08 PROCEDURE — 37000011 ZZH ANESTHESIA WARD SERVICE: Performed by: NURSE ANESTHETIST, CERTIFIED REGISTERED

## 2017-06-08 PROCEDURE — 72200001 ZZH LABOR CARE VAGINAL DELIVERY SINGLE

## 2017-06-08 PROCEDURE — 25000128 H RX IP 250 OP 636: Performed by: OBSTETRICS & GYNECOLOGY

## 2017-06-08 PROCEDURE — 25000132 ZZH RX MED GY IP 250 OP 250 PS 637: Performed by: OBSTETRICS & GYNECOLOGY

## 2017-06-08 PROCEDURE — 25000128 H RX IP 250 OP 636: Performed by: NURSE ANESTHETIST, CERTIFIED REGISTERED

## 2017-06-08 PROCEDURE — 3E0R3CZ INTRODUCTION OF REGIONAL ANESTHETIC INTO SPINAL CANAL, PERCUTANEOUS APPROACH: ICD-10-PCS | Performed by: OBSTETRICS & GYNECOLOGY

## 2017-06-08 PROCEDURE — 36415 COLL VENOUS BLD VENIPUNCTURE: CPT | Performed by: OBSTETRICS & GYNECOLOGY

## 2017-06-08 RX ORDER — ACETAMINOPHEN 325 MG/1
650 TABLET ORAL EVERY 4 HOURS PRN
Status: DISCONTINUED | OUTPATIENT
Start: 2017-06-08 | End: 2017-06-10 | Stop reason: HOSPADM

## 2017-06-08 RX ORDER — OXYCODONE HYDROCHLORIDE 5 MG/1
5-10 TABLET ORAL
Status: DISCONTINUED | OUTPATIENT
Start: 2017-06-08 | End: 2017-06-10 | Stop reason: HOSPADM

## 2017-06-08 RX ORDER — ROPIVACAINE HYDROCHLORIDE 2 MG/ML
INJECTION, SOLUTION EPIDURAL; INFILTRATION; PERINEURAL
Status: DISCONTINUED
Start: 2017-06-08 | End: 2017-06-08 | Stop reason: HOSPADM

## 2017-06-08 RX ORDER — NALOXONE HYDROCHLORIDE 0.4 MG/ML
.1-.4 INJECTION, SOLUTION INTRAMUSCULAR; INTRAVENOUS; SUBCUTANEOUS
Status: DISCONTINUED | OUTPATIENT
Start: 2017-06-08 | End: 2017-06-08

## 2017-06-08 RX ORDER — ACETAMINOPHEN 325 MG/1
650 TABLET ORAL EVERY 4 HOURS PRN
Status: DISCONTINUED | OUTPATIENT
Start: 2017-06-08 | End: 2017-06-08

## 2017-06-08 RX ORDER — EPHEDRINE SULFATE 50 MG/ML
INJECTION, SOLUTION INTRAMUSCULAR; INTRAVENOUS; SUBCUTANEOUS
Status: DISCONTINUED
Start: 2017-06-08 | End: 2017-06-08 | Stop reason: WASHOUT

## 2017-06-08 RX ORDER — OXYTOCIN/0.9 % SODIUM CHLORIDE 30/500 ML
100-340 PLASTIC BAG, INJECTION (ML) INTRAVENOUS CONTINUOUS PRN
Status: COMPLETED | OUTPATIENT
Start: 2017-06-08 | End: 2017-06-08

## 2017-06-08 RX ORDER — SODIUM CHLORIDE, SODIUM LACTATE, POTASSIUM CHLORIDE, CALCIUM CHLORIDE 600; 310; 30; 20 MG/100ML; MG/100ML; MG/100ML; MG/100ML
INJECTION, SOLUTION INTRAVENOUS CONTINUOUS
Status: DISCONTINUED | OUTPATIENT
Start: 2017-06-08 | End: 2017-06-08

## 2017-06-08 RX ORDER — HYDROCORTISONE 2.5 %
CREAM (GRAM) TOPICAL 3 TIMES DAILY PRN
Status: DISCONTINUED | OUTPATIENT
Start: 2017-06-08 | End: 2017-06-10 | Stop reason: HOSPADM

## 2017-06-08 RX ORDER — ONDANSETRON 2 MG/ML
4 INJECTION INTRAMUSCULAR; INTRAVENOUS EVERY 6 HOURS PRN
Status: DISCONTINUED | OUTPATIENT
Start: 2017-06-08 | End: 2017-06-08

## 2017-06-08 RX ORDER — NALOXONE HYDROCHLORIDE 0.4 MG/ML
.1-.4 INJECTION, SOLUTION INTRAMUSCULAR; INTRAVENOUS; SUBCUTANEOUS
Status: DISCONTINUED | OUTPATIENT
Start: 2017-06-08 | End: 2017-06-10 | Stop reason: HOSPADM

## 2017-06-08 RX ORDER — ONDANSETRON 4 MG/1
4 TABLET, ORALLY DISINTEGRATING ORAL EVERY 6 HOURS PRN
Status: DISCONTINUED | OUTPATIENT
Start: 2017-06-08 | End: 2017-06-10 | Stop reason: HOSPADM

## 2017-06-08 RX ORDER — ONDANSETRON 4 MG/1
4 TABLET, ORALLY DISINTEGRATING ORAL EVERY 6 HOURS PRN
Status: DISCONTINUED | OUTPATIENT
Start: 2017-06-08 | End: 2017-06-08

## 2017-06-08 RX ORDER — MISOPROSTOL 200 UG/1
400 TABLET ORAL
Status: DISCONTINUED | OUTPATIENT
Start: 2017-06-08 | End: 2017-06-10 | Stop reason: HOSPADM

## 2017-06-08 RX ORDER — DIPHENHYDRAMINE HYDROCHLORIDE 50 MG/ML
12.5-25 INJECTION INTRAMUSCULAR; INTRAVENOUS EVERY 4 HOURS PRN
Status: DISCONTINUED | OUTPATIENT
Start: 2017-06-08 | End: 2017-06-08

## 2017-06-08 RX ORDER — METHYLERGONOVINE MALEATE 0.2 MG/ML
200 INJECTION INTRAVENOUS
Status: DISCONTINUED | OUTPATIENT
Start: 2017-06-08 | End: 2017-06-08

## 2017-06-08 RX ORDER — LIDOCAINE HYDROCHLORIDE 10 MG/ML
INJECTION, SOLUTION EPIDURAL; INFILTRATION; INTRACAUDAL; PERINEURAL
Status: DISCONTINUED
Start: 2017-06-08 | End: 2017-06-08 | Stop reason: HOSPADM

## 2017-06-08 RX ORDER — FENTANYL CITRATE 50 UG/ML
INJECTION, SOLUTION INTRAMUSCULAR; INTRAVENOUS PRN
Status: DISCONTINUED | OUTPATIENT
Start: 2017-06-08 | End: 2017-06-08

## 2017-06-08 RX ORDER — LIDOCAINE HYDROCHLORIDE AND EPINEPHRINE 15; 5 MG/ML; UG/ML
INJECTION, SOLUTION EPIDURAL PRN
Status: DISCONTINUED | OUTPATIENT
Start: 2017-06-08 | End: 2017-06-08

## 2017-06-08 RX ORDER — OXYTOCIN/0.9 % SODIUM CHLORIDE 30/500 ML
340 PLASTIC BAG, INJECTION (ML) INTRAVENOUS CONTINUOUS PRN
Status: DISCONTINUED | OUTPATIENT
Start: 2017-06-08 | End: 2017-06-10 | Stop reason: HOSPADM

## 2017-06-08 RX ORDER — ROPIVACAINE HYDROCHLORIDE 2 MG/ML
INJECTION, SOLUTION EPIDURAL; INFILTRATION; PERINEURAL PRN
Status: DISCONTINUED | OUTPATIENT
Start: 2017-06-08 | End: 2017-06-08

## 2017-06-08 RX ORDER — LANOLIN 100 %
OINTMENT (GRAM) TOPICAL
Status: DISCONTINUED | OUTPATIENT
Start: 2017-06-08 | End: 2017-06-10 | Stop reason: HOSPADM

## 2017-06-08 RX ORDER — IBUPROFEN 800 MG/1
800 TABLET, FILM COATED ORAL
Status: DISCONTINUED | OUTPATIENT
Start: 2017-06-08 | End: 2017-06-08

## 2017-06-08 RX ORDER — OXYTOCIN 10 [USP'U]/ML
10 INJECTION, SOLUTION INTRAMUSCULAR; INTRAVENOUS
Status: DISCONTINUED | OUTPATIENT
Start: 2017-06-08 | End: 2017-06-08

## 2017-06-08 RX ORDER — EPHEDRINE SULFATE 50 MG/ML
5 INJECTION, SOLUTION INTRAMUSCULAR; INTRAVENOUS; SUBCUTANEOUS
Status: DISCONTINUED | OUTPATIENT
Start: 2017-06-08 | End: 2017-06-08

## 2017-06-08 RX ORDER — CARBOPROST TROMETHAMINE 250 UG/ML
250 INJECTION, SOLUTION INTRAMUSCULAR
Status: DISCONTINUED | OUTPATIENT
Start: 2017-06-08 | End: 2017-06-08

## 2017-06-08 RX ORDER — OXYTOCIN/0.9 % SODIUM CHLORIDE 30/500 ML
100 PLASTIC BAG, INJECTION (ML) INTRAVENOUS CONTINUOUS
Status: DISCONTINUED | OUTPATIENT
Start: 2017-06-08 | End: 2017-06-10 | Stop reason: HOSPADM

## 2017-06-08 RX ORDER — LIDOCAINE HYDROCHLORIDE 10 MG/ML
INJECTION, SOLUTION INFILTRATION; PERINEURAL PRN
Status: DISCONTINUED | OUTPATIENT
Start: 2017-06-08 | End: 2017-06-08

## 2017-06-08 RX ORDER — OXYCODONE AND ACETAMINOPHEN 5; 325 MG/1; MG/1
1 TABLET ORAL
Status: DISCONTINUED | OUTPATIENT
Start: 2017-06-08 | End: 2017-06-08

## 2017-06-08 RX ORDER — FENTANYL CITRATE 50 UG/ML
INJECTION, SOLUTION INTRAMUSCULAR; INTRAVENOUS
Status: DISCONTINUED
Start: 2017-06-08 | End: 2017-06-08 | Stop reason: HOSPADM

## 2017-06-08 RX ORDER — BISACODYL 10 MG
10 SUPPOSITORY, RECTAL RECTAL DAILY PRN
Status: DISCONTINUED | OUTPATIENT
Start: 2017-06-10 | End: 2017-06-10 | Stop reason: HOSPADM

## 2017-06-08 RX ORDER — AMOXICILLIN 250 MG
1-2 CAPSULE ORAL 2 TIMES DAILY
Status: DISCONTINUED | OUTPATIENT
Start: 2017-06-08 | End: 2017-06-10 | Stop reason: HOSPADM

## 2017-06-08 RX ORDER — ALBUTEROL SULFATE 90 UG/1
1-2 AEROSOL, METERED RESPIRATORY (INHALATION) EVERY 4 HOURS PRN
Status: DISCONTINUED | OUTPATIENT
Start: 2017-06-08 | End: 2017-06-10 | Stop reason: HOSPADM

## 2017-06-08 RX ORDER — IBUPROFEN 400 MG/1
400-800 TABLET, FILM COATED ORAL EVERY 6 HOURS PRN
Status: DISCONTINUED | OUTPATIENT
Start: 2017-06-08 | End: 2017-06-10 | Stop reason: HOSPADM

## 2017-06-08 RX ORDER — OXYTOCIN 10 [USP'U]/ML
10 INJECTION, SOLUTION INTRAMUSCULAR; INTRAVENOUS
Status: DISCONTINUED | OUTPATIENT
Start: 2017-06-08 | End: 2017-06-10 | Stop reason: HOSPADM

## 2017-06-08 RX ADMIN — IBUPROFEN 800 MG: 400 TABLET ORAL at 21:09

## 2017-06-08 RX ADMIN — ACETAMINOPHEN 650 MG: 325 TABLET, FILM COATED ORAL at 11:36

## 2017-06-08 RX ADMIN — ACETAMINOPHEN 650 MG: 325 TABLET, FILM COATED ORAL at 23:07

## 2017-06-08 RX ADMIN — IBUPROFEN 800 MG: 400 TABLET ORAL at 07:49

## 2017-06-08 RX ADMIN — OXYTOCIN-SODIUM CHLORIDE 0.9% IV SOLN 30 UNIT/500ML 340 ML/HR: 30-0.9/5 SOLUTION at 06:43

## 2017-06-08 RX ADMIN — Medication 5 ML: at 04:07

## 2017-06-08 RX ADMIN — ACETAMINOPHEN 650 MG: 325 TABLET, FILM COATED ORAL at 16:51

## 2017-06-08 RX ADMIN — SENNOSIDES AND DOCUSATE SODIUM 2 TABLET: 8.6; 5 TABLET ORAL at 21:09

## 2017-06-08 RX ADMIN — ONDANSETRON 4 MG: 4 TABLET, ORALLY DISINTEGRATING ORAL at 11:36

## 2017-06-08 RX ADMIN — LIDOCAINE HYDROCHLORIDE 9 ML: 10 INJECTION, SOLUTION INFILTRATION; PERINEURAL at 03:46

## 2017-06-08 RX ADMIN — SODIUM BICARBONATE 1 MEQ: 84 INJECTION, SOLUTION INTRAVENOUS at 03:46

## 2017-06-08 RX ADMIN — SENNOSIDES AND DOCUSATE SODIUM 1 TABLET: 8.6; 5 TABLET ORAL at 07:49

## 2017-06-08 RX ADMIN — SODIUM CHLORIDE, POTASSIUM CHLORIDE, SODIUM LACTATE AND CALCIUM CHLORIDE: 600; 310; 30; 20 INJECTION, SOLUTION INTRAVENOUS at 04:01

## 2017-06-08 RX ADMIN — Medication 10 ML/HR: at 04:09

## 2017-06-08 RX ADMIN — LIDOCAINE HYDROCHLORIDE,EPINEPHRINE BITARTRATE 3 ML: 15; .005 INJECTION, SOLUTION EPIDURAL; INFILTRATION; INTRACAUDAL; PERINEURAL at 03:57

## 2017-06-08 RX ADMIN — IBUPROFEN 800 MG: 400 TABLET ORAL at 14:48

## 2017-06-08 RX ADMIN — FENTANYL CITRATE 100 MCG: 50 INJECTION, SOLUTION INTRAMUSCULAR; INTRAVENOUS at 04:02

## 2017-06-08 RX ADMIN — SODIUM CHLORIDE, POTASSIUM CHLORIDE, SODIUM LACTATE AND CALCIUM CHLORIDE 1000 ML: 600; 310; 30; 20 INJECTION, SOLUTION INTRAVENOUS at 02:49

## 2017-06-08 NOTE — IP AVS SNAPSHOT
MRN:[id number]                      After Visit Summary   6/8/2017    Gisele Meadows    MRN: [id number]           Thank you!     Thank you for choosing [facility] for your care. Our goal is always to provide you with excellent care. Hearing back from our patients is one way we can continue to improve our services. Please take a few minutes to complete the written survey that you may receive in the mail after you visit with us. Thank you!        Patient Information     Date Of Birth          1985        About your hospital stay     You were admitted on:  June 8, 2017 You last received care in the:  [facility]    You were discharged on:  Yadira 10, 2017       Who to Call     For medical emergencies, please call 911.  For non-urgent questions about your medical care, please call your primary care provider or clinic, [phone]          Attending Provider     Provider Specialty    Anuja Jauregui MD OB/Gyn       Primary Care Provider Office Phone #    [facility] [phone]      After Care Instructions     Activity       Review discharge instructions            Diet       Resume previous diet            Discharge Instructions - Postpartum visit       Schedule postpartum visit with your provider and return to clinic in 6 weeks.  If you are possibly interested in having a type of long-term contraception (IUD or Nexplanon), let the  know so they can schedule extra time during this visit to have that placed.                  Further instructions from your care team       Follow-up with OB in 6 weeks    Postpartum Vaginal Delivery Instructions    Activity       Ask family and friends for help when you need it.    Do not place anything in your vagina for 6 weeks.    You are not restricted on other activities, but take it easy for a few weeks to allow your body to recover from delivery.  You are able to do any activities you feel up to that  point.    No driving until you have stopped taking your pain medications (usually two weeks after delivery).     Call your health care provider if you have any of these symptoms:       Increased pain, swelling, redness, or fluid around your stiches from an episiotomy or perineal tear.    A fever above 100.4 F (38 C) with or without chills when placing a thermometer under your tongue.    You soak a sanitary pad with blood within 1 hour, or you see blood clots larger than a golf ball.    Bleeding that lasts more than 6 weeks.    Vaginal discharge that smells bad.    Severe pain, cramping or tenderness in your lower belly area.    A need to urinate more frequently (use the toilet more often), more urgently (use the toilet very quickly), or it burns when you urinate.    Nausea and vomiting.    Redness, swelling or pain around a vein in your leg.    Problems breastfeeding or a red or painful area on your breast.    Chest pain and cough or are gasping for air.    Problems coping with sadness, anxiety, or depression.  If you have any concerns about hurting yourself or the baby, call your provider immediately.     You have questions or concerns after you return home.     Keep your hands clean:  Always wash your hands before touching your perineal area and stitches.  This helps reduce your risk of infection.  If your hands aren't dirty, you may use an alcohol hand-rub to clean your hands. Keep your nails clean and short.      If you you feel sad, have difficulty sleeping, feel overwhelmed, anxious or have troubling thoughts you may call your clinic, or the HelpLine for Pregnancy & Postpartum Support [place]. ([facility] HelpLine [phone]) or online resource list  @ www.ppsupport[place].org    If you have concerns/questions after returning home, contact the nurse triage line [phone] or your primary care clinic.      Pending Results     No orders found from 6/6/2017 to 6/9/2017.            Statement of Approval     Ordered           06/10/17 1028  I have reviewed and agree with all the recommendations and orders detailed in this document.  EFFECTIVE NOW     Approved and electronically signed by:  Char Burrell MD             Admission Information     Date & Time Provider Department Dept. Phone    6/8/2017 Anuja Jauregui MD [facility] [phone]      Your Vitals Were     Blood Pressure Pulse Temperature Respirations Last Period Pulse Oximetry    125/75 73 98.3  F (36.8  C) (Oral) 18 09/04/2016 96%      MyChart Information     [employer] gives you secure access to your electronic health record. If you see a primary care provider, you can also send messages to your care team and make appointments. If you have questions, please call your primary care clinic.  If you do not have a primary care provider, please call [phone] and they will assist you.        Care EveryWhere ID     This is your Care EveryWhere ID. This could be used by other organizations to access your [place] medical records  [id number]           Review of your medicines      UNREVIEWED medicines. Ask your doctor about these medicines        Dose / Directions    fluconazole 150 MG tablet   Commonly known as:  DIFLUCAN   Used for:  Yeast infection of the vagina   Ask about: Should I take this medication?        Dose:  150 mg   Take 1 tablet (150 mg) by mouth once for 1 dose   Quantity:  1 tablet   Refills:  1         CONTINUE these medicines which have NOT CHANGED        Dose / Directions    albuterol 108 (90 BASE) MCG/ACT Inhaler   Generic drug:  albuterol        Dose:  1-2 puff   Inhale 1-2 puffs into the lungs Reported on 4/5/2017   Refills:  0       ferrous gluconate 324 (38 FE) MG tablet   Commonly known as:  FERGON   Used for:  Hemorrhage of rectum and anus, Prenatal care, first pregnancy, second trimester, Uterine size date discrepancy, second trimester        Dose:  324 mg   Take 1 tablet (324 mg) by mouth 3 times daily (with  meals)   Quantity:  100 tablet   Refills:  3       prenatal multivitamin  plus iron 27-0.8 MG Tabs per tablet        Dose:  1 tablet   Take 1 tablet by mouth daily   Refills:  0       valACYclovir 1000 mg tablet   Commonly known as:  VALTREX   Used for:  Prenatal care, first pregnancy, third trimester, Genital herpes affecting pregnancy in third trimester        Dose:  1000 mg   Take 1 tablet (1,000 mg) by mouth daily   Quantity:  60 tablet   Refills:  3                Protect others around you: Learn how to safely use, store and throw away your medicines at www.disposemymeds.org.             Medication List: This is a list of all your medications and when to take them. Check marks below indicate your daily home schedule. Keep this list as a reference.      Medications           Morning Afternoon Evening Bedtime As Needed    albuterol 108 (90 BASE) MCG/ACT Inhaler   Inhale 1-2 puffs into the lungs Reported on 4/5/2017   Generic drug:  albuterol                                ferrous gluconate 324 (38 FE) MG tablet   Commonly known as:  FERGON   Take 1 tablet (324 mg) by mouth 3 times daily (with meals)                                prenatal multivitamin  plus iron 27-0.8 MG Tabs per tablet   Take 1 tablet by mouth daily                                valACYclovir 1000 mg tablet   Commonly known as:  VALTREX   Take 1 tablet (1,000 mg) by mouth daily                                  ASK your doctor about these medications           Morning Afternoon Evening Bedtime As Needed    fluconazole 150 MG tablet   Commonly known as:  DIFLUCAN   Take 1 tablet (150 mg) by mouth once for 1 dose   Ask about: Should I take this medication?

## 2017-06-08 NOTE — PLAN OF CARE
Problem: Individualization  Goal: Patient Preferences  Mother transferred to postpartum unit at 0930 via ambulatory completion of immediate recovery period. Patient oriented to room and plan of care.  Mother to  nursery to see infant who is on high flow oxygen in the special care nursery.

## 2017-06-08 NOTE — PLAN OF CARE
"Problem: Labor (Cervical Ripen, Induct, Augment) (Adult,Obstetrics,Pediatric)  Goal: Signs and Symptoms of Listed Potential Problems Will be Absent or Manageable (Labor)  Signs and symptoms of listed potential problems will be absent or manageable by discharge/transition of care (reference Labor (Cervical Ripen, Induct, Augment) (Adult,Obstetrics,Pediatric) CPG).   arrived to the birthplace with  \"Cuate\" at 0153 with SROM which happened at home at 0020. Thick mec fluid. Gabriel every 2-3 min. Dr. Crump here and performed SVE 3.5/-1. Patient admitted and desires an epidural.  Cat. 1.      "

## 2017-06-08 NOTE — PLAN OF CARE
Problem: Labor (Cervical Ripen, Induct, Augment) (Adult,Obstetrics,Pediatric)  Goal: Signs and Symptoms of Listed Potential Problems Will be Absent or Manageable (Labor)  Signs and symptoms of listed potential problems will be absent or manageable by discharge/transition of care (reference Labor (Cervical Ripen, Induct, Augment) (Adult,Obstetrics,Pediatric) CPG).   Outcome: No Change  0330 pt desires epidural.  Bolus infusing currently 300 ml remaining.  CRNA in room.  Pt off EFM and to bedside.    0355 back to bed, test dose given  0358 bolus dose. See trending vs

## 2017-06-08 NOTE — ANESTHESIA PROCEDURE NOTES
Peripheral nerve/Neuraxial procedure note : epidural catheter  Pre-Procedure  Performed by  RITCHIE MEDINA   Location: OB    Procedure Times:6/8/2017 3:46 AM and 6/8/2017 3:57 AM  Pre-Anesthestic Checklist: patient identified, IV checked, risks and benefits discussed, informed consent, monitors and equipment checked, pre-op evaluation and at physician/surgeon's request    Timeout  Correct Patient: Yes   Correct Procedure: Yes   Correct Site: Yes   Correct Laterality: N/A   Correct Position: Yes   Site Marked: N/A   .   Procedure Documentation    Diagnosis:IUP, ROM, active labor.    Procedure:    Epidural catheter.  Insertion Site:L3-4  (midline approach) Injection technique: LORT saline   Local skin infiltrated with 9 mL of 1% lidocaine.  ARI at 7 cm     Patient Prep;mask, sterile gloves, patient draped.  .  Needle: Touhy needle (17 G. 3.5 in). # of attempts: 1. # of redirects:.  Spinal Needle: . . . Catheter: 19 G . .  Catheter threaded easily  4 cm epidural space.  11 cm at skin.   .    Assessment/Narrative  Paresthesias: No.  .  .  Aspiration negative for heme or CSF  . Test dose of 3 mL at 03:57. Test dose negative for signs of intravascular, subdural or intrathecal injection. Comments:  VAS pain score prior to epidural:  6-7    VAS pain score after epidural:  0    Pt. Tolerated well, FHR stable.

## 2017-06-08 NOTE — ANESTHESIA CARE TRANSFER NOTE
Patient: Gisele Meadows    * No procedures listed *    Diagnosis: * No pre-op diagnosis entered *  Diagnosis Additional Information: No value filed.    Anesthesia Type:   No value filed.     Note:    Patient transferred to:Labor and Delivery        Vitals: (Last set prior to Anesthesia Care Transfer)              Electronically Signed By: Vivek Craig CRNA, APRN CRNA  June 8, 2017  2:37 PM

## 2017-06-08 NOTE — PLAN OF CARE
Problem: Labor (Cervical Ripen, Induct, Augment) (Adult,Obstetrics,Pediatric)  Goal: Signs and Symptoms of Listed Potential Problems Will be Absent or Manageable (Labor)  Signs and symptoms of listed potential problems will be absent or manageable by discharge/transition of care (reference Labor (Cervical Ripen, Induct, Augment) (Adult,Obstetrics,Pediatric) CPG).   Outcome: No Change  0627 pt with prolonged deceleration with zurdo of 55 x 6 minutes 20 sec.  Brief return to 120 into late deceleration.  Pt had O2 on at 10 liters via mask, IV LR bolus started. Pt turned to hands and knees. 0634 Dr. Crump updated on prolonged deceleration and interventions, asked to come to bedside as soon as possible.    S:Delivery  B:No Labor,39w4d    Lab Results   Component Value Date     GBS   2017       Negative  No GBS DNA detected, presumed negative for GBS or number of bacteria may be   below the limit of detection of the assay.   Assay performed on incubated broth culture of specimen using [employer] real-time   PCR.       with antibiotic treatment not indicated 4 hours prior to delivery.  A: Patient delivered VAVD was applied 0639 pop off x2 with pull with one contractions x 40 sec. at 0641 with Dr. WEI Jauregui in attendance and baby placed on mother's abdomen for immediate cord clamping. Baby to warmer for assessment/resusitative efforts.. Apgars 9/9/9.  IV infusion of Oxytocin  infused. Placenta removal spontaneous. See Flowsheet for VS and PP checks. Labor care plan goals met, transition now to postpartum care.  R: Expect routine postpartum care. Anticipate first feeding within the hour or whenever infant displays feeding cues. Continue skin to skin. Prior discussion with mother indicates that feeding plan is Breast feeding . Educated mother on importance of exclusive breastfeeding, expected feeding readiness cues and encouraged her to observe for these cues while rooming in. Informed her that  breastfeeding assistance would be provided.

## 2017-06-08 NOTE — ANESTHESIA POSTPROCEDURE EVALUATION
Patient: Gisele Meadows    * No procedures listed *    Diagnosis:* No pre-op diagnosis entered *  Diagnosis Additional Information: No value filed.    Anesthesia Type:  No value filed.    Note:  Anesthesia Post Evaluation    Patient location during evaluation: Bedside  Patient participation: Able to fully participate in evaluation  Level of consciousness: awake and alert  Pain management: adequate  Airway patency: patent  Cardiovascular status: acceptable  Respiratory status: acceptable  Hydration status: acceptable  PONV: none     Anesthetic complications: None          Last vitals:  Vitals:    06/08/17 0800 06/08/17 0815 06/08/17 0830   BP: 121/74 126/81 129/83   Pulse:      Resp:      Temp:      SpO2:            Electronically Signed By: Vivek Craig CRNA, APRN CRNA  June 8, 2017  2:37 PM

## 2017-06-08 NOTE — ANESTHESIA PREPROCEDURE EVALUATION
Anesthesia Evaluation     .             ROS/MED HX    ENT/Pulmonary:     (+)asthma , . .    Neurologic:  - neg neurologic ROS     Cardiovascular:  - neg cardiovascular ROS       METS/Exercise Tolerance:     Hematologic:         Musculoskeletal:         GI/Hepatic:  - neg GI/hepatic ROS       Renal/Genitourinary:         Endo:         Psychiatric:         Infectious Disease:         Malignancy:         Other:                     Physical Exam  Normal systems: cardiovascular, pulmonary and dental    Airway   Mallampati: II  TM distance: > 3 FB  Neck ROM: full  Mouth opening: > 3 cm    Dental     Cardiovascular       Pulmonary           neg OB ROS                 Anesthesia Plan      History & Physical Review      ASA Status:  .  OB Epidural Asa: 2            Postoperative Care      Consents  Anesthetic plan, risks, benefits and alternatives discussed with:  Patient..                          .

## 2017-06-08 NOTE — L&D DELIVERY NOTE
"Delivery Summary    Gisele Meadows MRN# [id number]   Age: 32 year old YOB: 1985     ASSESSMENT & PLAN: 32 year old  39w4d admitted in active labor, SROM meconium, epidural, Vacuum assisted for fetal bradycardia/decelerations over a right mediolateral episiotomy, placenta/spont/3vessel/intact with pitocin at cord clamp, right mediolateral episiotomy repaired with 3-0 monocryl, 2-0 vicryl in layers, cervix/rectum intact, . No complications  Anuja Jauregui MD          Labor Event Times    Dilation complete date:  17 Complete time:   6:27 AM   Start pushing date/time:  2017 0637            Labor Events     labor?:  No    steroids:  None      Rupture date/time: 17 0020   Rupture type:  Spontaneous rupture of membranes occuring during spontaneous labor or augmentation   Fluid color:  Meconium      Delivery/Placenta Date and Time    Delivery Date:  17 Delivery Time:   6:41 AM   Placenta Date/Time:  2017  6:51 AM   Oxytocin given at the time of delivery:  after delivery of baby      Vaginal Counts        Needles Suture [place] Sponges Instruments   Initial counts 2  5    Added to count  4     Final counts          Placed during labor Accounted for at the end of labor   Yes Yes   No    No          Final count correct?:  Yes         Apgars    Living status:  Living    1 Minute 5 Minute 10 Minute 15 Minute 20 Minute   Skin color: 1  1       Heart rate: 2  2       Reflex irritability: 2  2       Muscle tone: 2  2       Respiratory effort: 2  2       Total: 9  9          Apgars assigned by:  MARLENE GONZALEZ & JEFFREY CAO      Cord    Vessels:  3 Vessels Complications:  None   Cord Blood Disposition:  Lab Gases Sent?:  No         [place] Resuscitation    Methods:  Oximetry, NCPAP         [place] Measurements    Weight:  121 oz Length:  21.75\"   Head circumference:  0.318 m       Labor Events and Shoulder Dystocia    Fetal Tracing Prior to Delivery:  Category 2   Fetal " Tracing Comments:  fetal bradycardia, and severe variables   Shoulder dystocia present?:  Neg            Delivery (Maternal) (Provider to Complete) (123047)    Episiotomy:  Right Mediolateral   Indications for episiotomy:  Instrumented Delivery   Perineal lacerations:  None    Vaginal laceration?:  No    Cervical laceration?:  No    Est. blood loss (mL):  200         Mother's Information  Mother: Gisele Meadows #[id number]    Start of Mother's Information     IO Blood Loss  06/07/17 1841 - 06/08/17 0722    Mom's I/O Activity            End of Mother's Information  Mother: Gisele Meadows #[id number]            Delivery - Provider to Complete ([id number])    Delivering clinician:  J CARLOS FONTANEZ   Attempted Delivery Types (Choose all that apply):  Spontaneous Vaginal Delivery   Delivery Type (Choose the 1 that will go to the Birth History):  Vaginal, Vacuum (Extractor)   Verbal Informed Consent Obtained For Vacuum:  Yes Alternate Labor Strategies Discussed (vacuum):  Yes      Emergency Resources Available (vacuum):  Charge Nurse/Team   Type (vacuum):  Kiwi Accrued Pulling Time (vacuum):  40 seconds      # of Pop-Offs (vacuum):  2 # of Pulls/Contractions (vacuum):  1   Position (vacuum):  OA Fetal Station (vacuum):  +4      Indication for Operative Vaginal Delivery (vacuum):  Fetal Status   Operative Vaginal Delivery Brief Note Vacuum:  Progressed to complete/complete, s/p SROM with meconium, noted intermittent variable decelerations with moderate variability, developed prolonged bradycardia for 5 minutes resolved with knee chest, discussed Vacuum risks/benefits/alternatives, placed in lithotomy and with pushing station +4, REINA, Kiwi placed in appropriate position anterior to the posterior fontanelle, due to fetal hair the kiwi popped off twice and a tight perineum for which performed a right mediolateral episiotomy,  with appropriate pressure over 1 contraction and 3 pushes, fetal vertex delivered and  Kiwi released and baby delivered.   Placenta/spont/3vessel/intact with pitocin given at cord clamp.  Right mediolateral episiotomy repaired with 3-0 monocryl and 2-0 vicryl in layers.  Cervix/rectum intact.      No complications  Anuja Jauregui MD                    Placenta    Immediate Cord Clamping:  Done   Date/Time:  6/8/2017  6:51 AM   Removal:  Spontaneous   Comments:  grossly normal/3vessel/intact   Disposition:  Hospital disposal      Anesthesia    Method:  Epidural         Presentation and Position    Presentation:  Vertex    Occiput Anterior                    Anuja Jauregui MD

## 2017-06-08 NOTE — PLAN OF CARE
Problem: Labor (Cervical Ripen, Induct, Augment) (Adult,Obstetrics,Pediatric)  Goal: Signs and Symptoms of Listed Potential Problems Will be Absent or Manageable (Labor)  Signs and symptoms of listed potential problems will be absent or manageable by discharge/transition of care (reference Labor (Cervical Ripen, Induct, Augment) (Adult,Obstetrics,Pediatric) CPG).   Outcome: No Change  0535 difficult to trace FHTs while on side.  FSE placed due to late and variable decelerations.  0540 late deceleration. FSE was placed, O2 at 10 liters via simple mask, peanut ball placed.  IV bolus infusing.  0541 picking up artifact, cord replaced.

## 2017-06-08 NOTE — PROVIDER NOTIFICATION
06/08/17 0634   Provider Notification   Provider Name/Title Dr. Crump   Method of Notification In Department   Notification Reason Decels;SVE   Dr. Crump updated on Prolonged.

## 2017-06-08 NOTE — IP AVS SNAPSHOT
[facility]    [address] [address]    Phone:  [phone]    Fax:  [phone]                                       After Visit Summary   6/8/2017    Gisele Meadows    MRN: [id number]           After Visit Summary Signature Page     I have received my discharge instructions, and my questions have been answered. I have discussed any challenges I see with this plan with the nurse or doctor.    ..........................................................................................................................................  Patient/Patient Representative Signature      ..........................................................................................................................................  Patient Representative Print Name and Relationship to Patient    ..................................................               ................................................  Date                                            Time    ..........................................................................................................................................  Reviewed by Signature/Title    ...................................................              ..............................................  Date                                                            Time

## 2017-06-08 NOTE — H&P
[facility] Labor and Delivery History and Physical    Gisele Meadows MRN# [id number]   Age: 32 year old YOB: 1985     Date of Admission:  2017    Primary care provider: Evette, [facility]           Chief Complaint:   Gisele Meadows is a 32 year old female who is 39w4d pregnant and being admitted for labor management and SROM.  Having regular contractions.  Prenatal complications of twin pregnancy with one viable and recurrent genital HSV.  No prodrome or lesions.  Has been on valtrex 1000mg daily with outbreak at 30 weeks.          Pregnancy history:     OBSTETRIC HISTORY:    Obstetric History       T0      L0     SAB0   TAB0   Ectopic0   Multiple0   Live Births0       # Outcome Date GA Lbr Kirby/2nd Weight Sex Delivery Anes PTL Lv   1 Current                   EDC: Estimated Date of Delivery: 17    Prenatal Labs:   Lab Results   Component Value Date    ABO A 2016    RH  Pos 2016    AS Neg 2016    HEPBANG Nonreactive 2016    TREPAB Negative 2016    HGB 10.3 (L) 2017       GBS Status:   Lab Results   Component Value Date    GBS  2017     Negative  No GBS DNA detected, presumed negative for GBS or number of bacteria may be   below the limit of detection of the assay.   Assay performed on incubated broth culture of specimen using [employer] real-time   PCR.         Active Problem List  Patient Active Problem List   Diagnosis     Prenatal care, first pregnancy     Cervical intraepithelial neoplasia grade 2     Exercise-induced asthma     Twin pregnancy with fetal loss and retention of one fetus, antepartum     Recurrent cold sores     Internal hemorrhoid     Recurrent genital HSV (herpes simplex virus) infection     Supervision of normal first pregnancy       Medication Prior to Admission  Prescriptions Prior to Admission   Medication Sig Dispense Refill Last Dose     fluconazole (DIFLUCAN) 150 MG tablet Take 1 tablet (150  mg) by mouth once for 1 dose 1 tablet 1 6/7/2017 at 1800     valACYclovir (VALTREX) 1000 mg tablet Take 1 tablet (1,000 mg) by mouth daily 60 tablet 3 6/7/2017 at 0600     ferrous gluconate (FERGON) 324 (38 FE) MG tablet Take 1 tablet (324 mg) by mouth 3 times daily (with meals) 100 tablet 3 6/7/2017 at 1230     Prenatal Vit-Fe Fumarate-FA (PRENATAL MULTIVITAMIN  PLUS IRON) 27-0.8 MG TABS Take 1 tablet by mouth daily   6/7/2017 at 1230     albuterol (ALBUTEROL) 108 (90 BASE) MCG/ACT inhaler Inhale 1-2 puffs into the lungs Reported on 4/5/2017   Unknown at Unknown time   .        Maternal Past Medical History:     Past Medical History:   Diagnosis Date     Chickenpox      Genital herpes      Seizure (H)     x2 at age 20                       Family History:   I have reviewed this patient's family history            Social History:   I have reviewed this patient's social history         Review of Systems:   C: NEGATIVE for fever, chills, change in weight  E/M: NEGATIVE for ear, mouth and throat problems  R: NEGATIVE for significant cough or SOB  CV: NEGATIVE for chest pain, palpitations or peripheral edema          Physical Exam:   Vitals were reviewed  Temp: 97.8  F (36.6  C) Temp src: Oral BP: 132/86 Pulse: 67   Resp: 18        Constitutional:   awake, alert, cooperative, no apparent distress, and appears stated age     Back:   Symmetric, no curvature, spinous processes are non-tender on palpation, paraspinous muscles are non-tender on palpation, no costal vertebral tenderness     Lungs:   No increased work of breathing, good air exchange, clear to auscultation bilaterally, no crackles or wheezing     Cardiovascular:   Normal apical impulse, regular rate and rhythm, normal S1 and S2, no S3 or S4, and no murmur noted     Abdomen:   No scars, normal bowel sounds, soft, non-distended, non-tender, no masses palpated, no hepatosplenomegally     Genitounirinary:   External Genitalia:  General appearance; normal      Ext-trace edema, neg cords/homans   Cervix:   Membranes: SROM  meconium stained fluid thick   Dilation: 3   Effacement: 90%   Station:-1   Consistency: soft   Position: Mid  Presentation:Cephalic  Fetal Heart Rate Tracing: reactive and reassuring, Tier 1 (normal)  Tocometer: external monitor and frequency q 3-5 minutes   EFW 7 1/2 lbs  Adequate gynecoid pelvis                    Assessment:   Gisele Meadows is a 39w4d pregnant female admitted with labor management and SROM. Meconium.  History of genital HSV on valtrex.  No outbreak or prodrome.            Plan:   Admit - see IP orders  Meconium-peds at delivery  Pain-considering natural possible epidural  Discussed with Gisele Meadows, the following; indications; the agents and methods of labor augmentation, including risks, benefits, and alternative approaches; and the possible need for  birth. EFW is AGA.    The Labor Induction:what you need to know information sheet was made available to her. Questions and concerns were addressed and patient agrees to above if necessary during the course of her labor.    Anuja Jauregui MD

## 2017-06-09 LAB — T PALLIDUM IGG+IGM SER QL: NEGATIVE

## 2017-06-09 PROCEDURE — 12000027 ZZH R&B OB

## 2017-06-09 PROCEDURE — 25000132 ZZH RX MED GY IP 250 OP 250 PS 637: Performed by: OBSTETRICS & GYNECOLOGY

## 2017-06-09 RX ADMIN — IBUPROFEN 800 MG: 400 TABLET ORAL at 20:44

## 2017-06-09 RX ADMIN — SENNOSIDES AND DOCUSATE SODIUM 1 TABLET: 8.6; 5 TABLET ORAL at 08:32

## 2017-06-09 RX ADMIN — ACETAMINOPHEN 650 MG: 325 TABLET, FILM COATED ORAL at 05:18

## 2017-06-09 RX ADMIN — ACETAMINOPHEN 650 MG: 325 TABLET, FILM COATED ORAL at 23:16

## 2017-06-09 RX ADMIN — ACETAMINOPHEN 650 MG: 325 TABLET, FILM COATED ORAL at 17:05

## 2017-06-09 RX ADMIN — IBUPROFEN 800 MG: 400 TABLET ORAL at 14:40

## 2017-06-09 RX ADMIN — IBUPROFEN 800 MG: 400 TABLET ORAL at 08:32

## 2017-06-09 RX ADMIN — IBUPROFEN 800 MG: 400 TABLET ORAL at 02:57

## 2017-06-09 RX ADMIN — SENNOSIDES AND DOCUSATE SODIUM 2 TABLET: 8.6; 5 TABLET ORAL at 23:16

## 2017-06-09 NOTE — PROGRESS NOTES
Postpartum progress note  2017     S: Doing pretty well, a little sore down below at the episiotomy. Pain is pretty well controlled. Lochia minimal. Ambulating without difficulty. Voiding without difficulty. Passing flatus, no BM yet.  Tolerating po intake.     PHYSICAL EXAM:   Vitals:    17 1449 17 1450 17 0006 17 0835   BP: 130/66 130/66 128/73 137/82   Pulse:   73    Resp:  18 18   Temp: 98.6  F (37  C) 98.6  F (37  C) 98.6  F (37  C) 97.9  F (36.6  C)   TempSrc: Oral Oral Oral    SpO2:   98% 97%       General: sitting up, alert and cooperative  Abd: soft, non-distended, non-tender. Fundus firm, nontender, 2 cm below umbilicus.   Extremities: calves nontender, trace edema of lower extremities bilaterally    Lab Results   Component Value Date    HGB 10.3 2017    HGB 9.7 2017     Blood type:   Lab Results   Component Value Date    RH  Pos 2017         ASSESSMENT: 32 year old  PPD 1 s/p VAVD.     PLAN:  - Heme: not checked, however no s/s ABLA  - Feeding: breast, going pretty well  - Birth control: undecided   - Rh status: pos, Rhogam not indicated   - Rubella status: immune  - Dispo: home tomorrow    Char Burrell MD, MPH  [facility]

## 2017-06-09 NOTE — PLAN OF CARE
Problem: Postpartum, Vaginal Delivery (Adult)  Goal: Signs and Symptoms of Listed Potential Problems Will be Absent or Manageable (Postpartum, Vaginal Delivery)  Signs and symptoms of listed potential problems will be absent or manageable by discharge/transition of care (reference Postpartum, Vaginal Delivery (Adult) CPG).   Outcome: Improving  Data: Vital signs within normal limits. Postpartum checks within normal limits - see flow record. Patient eating and drinking normally. Patient able to empty bladder independently. . Patient ambulating independently..   No apparent signs of infection. Episiotomy healing well. Patient Is performing self cares and Is able to care for infant. Positive attachment behaviors are observed with infant. Support persons are present.  Action:  Pain plan was discussed. Patient would like pain meds to be brought in when they are due. Patient was medicated during the shift for pain. See MAR.Patient education done about hand hygiene, breastfeeding and pain. See flow record.  Response:   Patient reassessed within 1 hour after each medication for pain. Patient stated that pain had improved. Patient stated that she was comfortable. .   Plan: Anticipate discharge unknown at this time.

## 2017-06-10 VITALS
OXYGEN SATURATION: 96 % | TEMPERATURE: 98.3 F | HEART RATE: 73 BPM | RESPIRATION RATE: 18 BRPM | SYSTOLIC BLOOD PRESSURE: 125 MMHG | DIASTOLIC BLOOD PRESSURE: 75 MMHG

## 2017-06-10 PROCEDURE — 25000132 ZZH RX MED GY IP 250 OP 250 PS 637: Performed by: OBSTETRICS & GYNECOLOGY

## 2017-06-10 RX ADMIN — SENNOSIDES AND DOCUSATE SODIUM 1 TABLET: 8.6; 5 TABLET ORAL at 09:20

## 2017-06-10 RX ADMIN — IBUPROFEN 800 MG: 400 TABLET ORAL at 03:40

## 2017-06-10 RX ADMIN — ACETAMINOPHEN 650 MG: 325 TABLET, FILM COATED ORAL at 06:43

## 2017-06-10 RX ADMIN — ACETAMINOPHEN 650 MG: 325 TABLET, FILM COATED ORAL at 12:01

## 2017-06-10 RX ADMIN — IBUPROFEN 800 MG: 400 TABLET ORAL at 09:20

## 2017-06-10 NOTE — DISCHARGE INSTRUCTIONS
Follow-up with OB in 6 weeks    Postpartum Vaginal Delivery Instructions    Activity       Ask family and friends for help when you need it.    Do not place anything in your vagina for 6 weeks.    You are not restricted on other activities, but take it easy for a few weeks to allow your body to recover from delivery.  You are able to do any activities you feel up to that point.    No driving until you have stopped taking your pain medications (usually two weeks after delivery).     Call your health care provider if you have any of these symptoms:       Increased pain, swelling, redness, or fluid around your stiches from an episiotomy or perineal tear.    A fever above 100.4 F (38 C) with or without chills when placing a thermometer under your tongue.    You soak a sanitary pad with blood within 1 hour, or you see blood clots larger than a golf ball.    Bleeding that lasts more than 6 weeks.    Vaginal discharge that smells bad.    Severe pain, cramping or tenderness in your lower belly area.    A need to urinate more frequently (use the toilet more often), more urgently (use the toilet very quickly), or it burns when you urinate.    Nausea and vomiting.    Redness, swelling or pain around a vein in your leg.    Problems breastfeeding or a red or painful area on your breast.    Chest pain and cough or are gasping for air.    Problems coping with sadness, anxiety, or depression.  If you have any concerns about hurting yourself or the baby, call your provider immediately.     You have questions or concerns after you return home.     Keep your hands clean:  Always wash your hands before touching your perineal area and stitches.  This helps reduce your risk of infection.  If your hands aren't dirty, you may use an alcohol hand-rub to clean your hands. Keep your nails clean and short.      If you you feel sad, have difficulty sleeping, feel overwhelmed, anxious or have troubling thoughts you may call your clinic, or the  HelpLine for Pregnancy & Postpartum Support [place]. ([facility] HelpLine 155-291-4038) or online resource list  @ www.ppsupport[place].org    If you have concerns/questions after returning home, contact the nurse triage line [phone] or your primary care clinic.

## 2017-06-10 NOTE — DISCHARGE SUMMARY
VAGINAL DELIVERY DISCHARGE SUMMARY    Admit date: 2017  Discharge date: 6/10/2017     Admit Dx:   - 32 year old  at 39w4d    - HSV2    Discharge Dx:  - Same as above, s/p     Procedures:  - vacuum assisted vaginal delivery  - Epidural analgesia  - right mediolateral episiotomy    Admit HPI:  Gisele Meadows is a 32 year old female who is 39w4d pregnant and being admitted for labor management and SROM.  Having regular contractions.  Prenatal complications of twin pregnancy with one viable and recurrent genital HSV.  No prodrome or lesions.  Has been on valtrex 1000mg daily with outbreak at 30 weeks. Please see her admit H&P for full details of her PMH, PSH, Meds, Allergies and exam on admit.    Hospital course:  32 year old  39w4d admitted in active labor, SROM meconium, epidural, Vacuum assisted for fetal bradycardia/decelerations over a right mediolateral episiotomy, placenta/spont/3vessel/intact with pitocin at cord clamp, right mediolateral episiotomy repaired with 3-0 monocryl, 2-0 vicryl in layers, cervix/rectum intact, . No complications. Please see her Delivery Summary for full details regarding her delivery.    Her postpartum course was complicated by nothing. On PPD#2, she was meeting all of her postpartum goals and deemed stable for discharge. She was voiding without difficulty, tolerating a regular diet without nausea and vomiting, her pain was well controlled on oral pain medicines and her lochia was appropriate. Her hemoglobin after delivery was not rechecked and she was hemodynamically stable. Her Rh status was pos, and Rhogam was not indicated. She was rubella immune.  At the time of discharge, she was breast feeding her infant and considering LARC for contraception.    Physical exam on the day of discharge:  Vitals:    17 0835 17 1707 17 2319 06/10/17 0823   BP: 137/82 133/80 118/70 125/75   Pulse:       Resp: 18 18 18 18   Temp: 97.9  F (36.6  C) 98.2  F (36.8   C) 97.8  F (36.6  C) 98.3  F (36.8  C)   TempSrc: Oral Oral Oral Oral   SpO2: 97% 97% 98% 96%       General: sitting up, alert and cooperative  Abd: soft, non-distended, non-tender. Fundus firm, nontender, 2 cm below umbilicus.   Extremities: calves nontender, trace edema of lower extremities bilaterally    Lab Results   Component Value Date    HGB 10.3 02/20/2017    HGB 9.7 02/07/2017     Blood type:   Lab Results   Component Value Date    RH  Pos 06/08/2017       Discharge/Disposition:  Ms. Gisele Meadows was discharged to home in stable condition with the following instructions/medications:  1) Call for temperature > 100.4, foul smelling vaginal discharge, bleeding > 1 pad per hour x 2 hrs, pain not controlled by oral pain meds, severe constipation or severe nausea or vomiting.  2) Contraception counseling was provided.  3) She was instructed to follow-up with her primary OB in 6 weeks for a routine postpartum visit and LARC placement if desired.  4) She was instructed to continue her PNV on discharge if she wished to breast feed her infant.  5) She was discharged home with the following medications: none (will use home ibuprofen).    Char Burrell MD, MPH  [facility]

## 2017-06-10 NOTE — PLAN OF CARE
Problem: Postpartum, Vaginal Delivery (Adult)  Goal: Signs and Symptoms of Listed Potential Problems Will be Absent or Manageable (Postpartum, Vaginal Delivery)  Signs and symptoms of listed potential problems will be absent or manageable by discharge/transition of care (reference Postpartum, Vaginal Delivery (Adult) CPG).   Outcome: Improving  Patient progressing well.  Ambulating, Eating and voiding well. Independent with mother/baby cares. Bonding well with infant.  Breast feeding is going well, baby is latching well and feeding for good lengths of time. Patient rates perineum Pain comfortably manageable.  Taking Ibuprofen and tylenol when due with good relief.  Made plan with patient to bring pain medication when due. Patient encouraged to report increased bleeding or clots.

## 2017-06-10 NOTE — PLAN OF CARE
Problem: Discharge Planning  Goal: Discharge Planning (Adult, OB, Behavioral, Peds)  Outcome: Completed Date Met:  06/10/17  Patient discharged per ambulatory with infant in car seat. Mother verified that her band matches her infant's band by comparing the infant's.  Discharge instructions given. Encouraged to call for any problems, questions or concerns.

## 2017-06-11 ENCOUNTER — APPOINTMENT (OUTPATIENT)
Dept: GENERAL RADIOLOGY | Facility: CLINIC | Age: 32
End: 2017-06-11
Attending: FAMILY MEDICINE
Payer: COMMERCIAL

## 2017-06-11 ENCOUNTER — NURSE TRIAGE (OUTPATIENT)
Dept: NURSING | Facility: CLINIC | Age: 32
End: 2017-06-11

## 2017-06-11 ENCOUNTER — APPOINTMENT (OUTPATIENT)
Dept: CT IMAGING | Facility: CLINIC | Age: 32
End: 2017-06-11
Attending: FAMILY MEDICINE
Payer: COMMERCIAL

## 2017-06-11 ENCOUNTER — HOSPITAL ENCOUNTER (EMERGENCY)
Facility: CLINIC | Age: 32
Discharge: HOME | End: 2017-06-12
Attending: FAMILY MEDICINE | Admitting: FAMILY MEDICINE
Payer: COMMERCIAL

## 2017-06-11 DIAGNOSIS — R60.0 BILATERAL EDEMA OF LOWER EXTREMITY: ICD-10-CM

## 2017-06-11 DIAGNOSIS — I15.9 SECONDARY HYPERTENSION: ICD-10-CM

## 2017-06-11 LAB
ALBUMIN SERPL-MCNC: 2.4 G/DL (ref 3.4–5)
ALP SERPL-CCNC: 122 U/L (ref 40–150)
ALT SERPL W P-5'-P-CCNC: 60 U/L (ref 0–50)
ANION GAP SERPL CALCULATED.3IONS-SCNC: 8 MMOL/L (ref 3–14)
AST SERPL W P-5'-P-CCNC: 58 U/L (ref 0–45)
BASOPHILS # BLD AUTO: 0.1 10E9/L (ref 0–0.2)
BASOPHILS NFR BLD AUTO: 0.4 %
BILIRUB SERPL-MCNC: 0.2 MG/DL (ref 0.2–1.3)
BUN SERPL-MCNC: 9 MG/DL (ref 7–30)
CALCIUM SERPL-MCNC: 8.1 MG/DL (ref 8.5–10.1)
CHLORIDE SERPL-SCNC: 108 MMOL/L (ref 94–109)
CO2 SERPL-SCNC: 23 MMOL/L (ref 20–32)
CREAT SERPL-MCNC: 0.67 MG/DL (ref 0.52–1.04)
D DIMER PPP FEU-MCNC: 0.9 UG/ML FEU (ref 0–0.5)
DIFFERENTIAL METHOD BLD: ABNORMAL
EOSINOPHIL # BLD AUTO: 0.6 10E9/L (ref 0–0.7)
EOSINOPHIL NFR BLD AUTO: 4.6 %
ERYTHROCYTE [DISTWIDTH] IN BLOOD BY AUTOMATED COUNT: 12.4 % (ref 10–15)
GFR SERPL CREATININE-BSD FRML MDRD: ABNORMAL ML/MIN/1.7M2
GLUCOSE SERPL-MCNC: 87 MG/DL (ref 70–99)
HCT VFR BLD AUTO: 30.7 % (ref 35–47)
HGB BLD-MCNC: 10.2 G/DL (ref 11.7–15.7)
IMM GRANULOCYTES # BLD: 0 10E9/L (ref 0–0.4)
IMM GRANULOCYTES NFR BLD: 0.2 %
LYMPHOCYTES # BLD AUTO: 3.3 10E9/L (ref 0.8–5.3)
LYMPHOCYTES NFR BLD AUTO: 24.8 %
MCH RBC QN AUTO: 31.6 PG (ref 26.5–33)
MCHC RBC AUTO-ENTMCNC: 33.2 G/DL (ref 31.5–36.5)
MCV RBC AUTO: 95 FL (ref 78–100)
MONOCYTES # BLD AUTO: 1.1 10E9/L (ref 0–1.3)
MONOCYTES NFR BLD AUTO: 8 %
NEUTROPHILS # BLD AUTO: 8.2 10E9/L (ref 1.6–8.3)
NEUTROPHILS NFR BLD AUTO: 62 %
NT-PROBNP SERPL-MCNC: 208 PG/ML (ref 0–450)
PLATELET # BLD AUTO: 370 10E9/L (ref 150–450)
POTASSIUM SERPL-SCNC: 4 MMOL/L (ref 3.4–5.3)
PROT SERPL-MCNC: 6.5 G/DL (ref 6.8–8.8)
RBC # BLD AUTO: 3.23 10E12/L (ref 3.8–5.2)
SODIUM SERPL-SCNC: 139 MMOL/L (ref 133–144)
TROPONIN I SERPL-MCNC: NORMAL UG/L (ref 0–0.04)
WBC # BLD AUTO: 13.2 10E9/L (ref 4–11)

## 2017-06-11 PROCEDURE — 84484 ASSAY OF TROPONIN QUANT: CPT | Performed by: FAMILY MEDICINE

## 2017-06-11 PROCEDURE — 25000125 ZZHC RX 250: Performed by: FAMILY MEDICINE

## 2017-06-11 PROCEDURE — 71260 CT THORAX DX C+: CPT

## 2017-06-11 PROCEDURE — 25000128 H RX IP 250 OP 636: Performed by: FAMILY MEDICINE

## 2017-06-11 PROCEDURE — 80053 COMPREHEN METABOLIC PANEL: CPT | Performed by: FAMILY MEDICINE

## 2017-06-11 PROCEDURE — 99285 EMERGENCY DEPT VISIT HI MDM: CPT | Mod: 25

## 2017-06-11 PROCEDURE — 85025 COMPLETE CBC W/AUTO DIFF WBC: CPT | Performed by: FAMILY MEDICINE

## 2017-06-11 PROCEDURE — 71020 XR CHEST 2 VW: CPT

## 2017-06-11 PROCEDURE — 99285 EMERGENCY DEPT VISIT HI MDM: CPT | Performed by: EMERGENCY MEDICINE

## 2017-06-11 PROCEDURE — 25000132 ZZH RX MED GY IP 250 OP 250 PS 637: Performed by: FAMILY MEDICINE

## 2017-06-11 PROCEDURE — 83880 ASSAY OF NATRIURETIC PEPTIDE: CPT | Performed by: FAMILY MEDICINE

## 2017-06-11 PROCEDURE — 85379 FIBRIN DEGRADATION QUANT: CPT | Performed by: FAMILY MEDICINE

## 2017-06-11 RX ORDER — IOPAMIDOL 755 MG/ML
81 INJECTION, SOLUTION INTRAVASCULAR ONCE
Status: COMPLETED | OUTPATIENT
Start: 2017-06-11 | End: 2017-06-11

## 2017-06-11 RX ADMIN — IOPAMIDOL 81 ML: 755 INJECTION, SOLUTION INTRAVENOUS at 23:02

## 2017-06-11 RX ADMIN — IBUPROFEN 600 MG: 400 TABLET ORAL at 23:27

## 2017-06-11 RX ADMIN — SODIUM CHLORIDE 104 ML: 9 INJECTION, SOLUTION INTRAVENOUS at 23:03

## 2017-06-11 NOTE — ED AVS SNAPSHOT
[facility]    [address] [facility] [address]    Phone:  [phone]    Fax:  [phone]                                       Gisele Meadows   MRN: [id number]    Department:  [facility]   Date of Visit:  6/11/2017           After Visit Summary Signature Page     I have received my discharge instructions, and my questions have been answered. I have discussed any challenges I see with this plan with the nurse or doctor.    ..........................................................................................................................................  Patient/Patient Representative Signature      ..........................................................................................................................................  Patient Representative Print Name and Relationship to Patient    ..................................................               ................................................  Date                                            Time    ..........................................................................................................................................  Reviewed by Signature/Title    ...................................................              ..............................................  Date                                                            Time

## 2017-06-11 NOTE — ED AVS SNAPSHOT
[facility]    [address] [facility] [address]    Phone:  [phone]    Fax:  [phone]                                       Gisele Meadows   MRN: [id number]    Department:  [facility]   Date of Visit:  6/11/2017           Patient Information     Date Of Birth          1985        Your diagnoses for this visit were:     Bilateral edema of lower extremity     Secondary hypertension        You were seen by Charlie Hendricks MD and Aj Whitfield MD.      Follow-up Information     Follow up with [facility]. Schedule an appointment as soon as possible for a visit in 2 days.    Specialty:  OB/Gyn    Why:  For follow-up and repeat blood work    Contact information:    [address] [place] [id number] [phone]    Additional information:    The medical center is located at   [address] [facility] ([address] in [place], [place] north   of [place]).        Discharge Instructions         Coping with Edema  What is edema?  Edema is the build-up of fluid in the body, which causes swelling. Swelling most commonly occurs in the feet, ankles, lower legs or hands.  Swelling can occur in the belly or chest may be a sign of a more severe problem.  Certain medicines or conditions can make the swelling worse.  Symptoms include:    Feet and lower legs get larger when you sit or walk.    Hands feel tight when you make a fist.    When you push on the skin, skin stays dented.    Shiny, tight skin.    Fast weight gain.  How is it treated?  Your care team may give you a medicine to reduce the swelling.  They may also suggest that you meet with a dietitian. He or she can help with food choices to reduce the swelling.  What can I do about the swelling?    Place your feet above your heart 3 times a day: Sit with your feet up on a stool with a pillow. Sit on the bed or couch with two pillows under your  feet.    Do not stand for long periods of time.    Wear loose-fitting clothes.    Do not cross your legs.    Reduce the salt in your diet.   These foods are high in salt:    Chips, soup    Frozen meals, TV dinners    Castaneda, lunch meat, ham    Sauces (soy, canned spaghetti sauce)    Walk or do other exercise.    Wear compression stockings.    Drink water as normal.    Weigh yourself every day at the same time to keep track of weight gain.  When should I call my care team?  Call your care team if:    You have a hard time breathing.    You gained 5 pounds or more in 1 week.    Your hands or feet feel cold when you touch them.    You are peeing very little or not at all.    Swelling is moving up your arms or legs.    Your tongue is swelling.    You cannot eat for more than a day  If you have any side effects, call us. We can help you manage these problems.  For more information, see:  www.[facility].com  www.cancer.org/treatment/treatmentsandsideeffects/physicalsideeffects/dealingwithsymptomsathome  www.cancer.gov/cancertopics/coping/chemotherapy-and-you  Comments:  __________________________________________  __________________________________________  __________________________________________  __________________________________________  __________________________________________  __________________________________________  __________________________________________  For informational purposes only. Not to replace the advice of your health care provider.  Copyright   2014 [facility]. All rights reserved. [employer] 135559 - REV 03/16.  Recheck your blood work in 2 days and then follow-up in the clinic after blood work check.    24 Hour Appointment Hotline       To make an appointment at any [facility], call [phone] ([phone]). If you don't have a family doctor or clinic, we will help you find one. [facility] clinics are conveniently located to serve the needs of you and your family.           ED Discharge Orders     CBC with platelets differential       Last Lab Result: Hemoglobin (g/dL)       Date                     Value                 06/11/2017               10.2 (L)         ----------            Comprehensive metabolic panel                    Review of your medicines      Our records show that you are taking the medicines listed below. If these are incorrect, please call your family doctor or clinic.        Dose / Directions Last dose taken    albuterol 108 (90 BASE) MCG/ACT Inhaler   Dose:  1-2 puff   Generic drug:  albuterol        Inhale 1-2 puffs into the lungs Reported on 4/5/2017   Refills:  0        ferrous gluconate 324 (38 FE) MG tablet   Commonly known as:  FERGON   Dose:  324 mg   Quantity:  100 tablet        Take 1 tablet (324 mg) by mouth 3 times daily (with meals)   Refills:  3        prenatal multivitamin  plus iron 27-0.8 MG Tabs per tablet   Dose:  1 tablet        Take 1 tablet by mouth daily   Refills:  0        valACYclovir 1000 mg tablet   Commonly known as:  VALTREX   Dose:  1000 mg   Quantity:  60 tablet        Take 1 tablet (1,000 mg) by mouth daily   Refills:  3                Procedures and tests performed during your visit     CBC with platelets differential    CT Chest Pulmonary Embolism w Contrast    Comprehensive metabolic panel    D dimer quantitative    Nt probnp inpatient (BNP)    Troponin I    UA with Microscopic    US Lower Extremity Venous Duplex Bilateral    XR Chest 2 Views      Orders Needing Specimen Collection     None      Pending Results     Date and Time Order Name Status Description    6/11/2017 2234 CT Chest Pulmonary Embolism w Contrast Preliminary             Pending Culture Results     No orders found for last 3 day(s).            Pending Results Instructions     If you had any lab results that were not finalized at the time of your Discharge, you can call the ED Lab Result RN at [phone]. You will be contacted by this team for any positive  Lab results or changes in treatment. The nurses are available 7 days a week from 10A to 6:30P.  You can leave a message 24 hours per day and they will return your call.        Test Results From Your Hospital Stay        6/11/2017  9:58 PM      Component Results     Component Value Ref Range & Units Status    WBC 13.2 (H) 4.0 - 11.0 10e9/L Final    RBC Count 3.23 (L) 3.8 - 5.2 10e12/L Final    Hemoglobin 10.2 (L) 11.7 - 15.7 g/dL Final    Hematocrit 30.7 (L) 35.0 - 47.0 % Final    MCV 95 78 - 100 fl Final    MCH 31.6 26.5 - 33.0 pg Final    MCHC 33.2 31.5 - 36.5 g/dL Final    RDW 12.4 10.0 - 15.0 % Final    Platelet Count 370 150 - 450 10e9/L Final    Diff Method Automated Method  Final    % Neutrophils 62.0 % Final    % Lymphocytes 24.8 % Final    % Monocytes 8.0 % Final    % Eosinophils 4.6 % Final    % Basophils 0.4 % Final    % Immature Granulocytes 0.2 % Final    Absolute Neutrophil 8.2 1.6 - 8.3 10e9/L Final    Absolute Lymphocytes 3.3 0.8 - 5.3 10e9/L Final    Absolute Monocytes 1.1 0.0 - 1.3 10e9/L Final    Absolute Eosinophils 0.6 0.0 - 0.7 10e9/L Final    Absolute Basophils 0.1 0.0 - 0.2 10e9/L Final    Abs Immature Granulocytes 0.0 0 - 0.4 10e9/L Final         6/11/2017  9:56 PM      Component Results     Component Value Ref Range & Units Status    D Dimer 0.9 (H) 0.0 - 0.50 ug/ml FEU Final    This D-dimer assay is intended for use in conjuntion with a clinical pretest   probability assessment model to exclude pulmonary embolism (PE) and as an aid   in the diagnosis of deep venous thrombosis (DVT) in outpatients suspected of   PE   or DVT. The cut-off value is 0.5 g/mL FEU.           6/11/2017 10:16 PM      Component Results     Component Value Ref Range & Units Status    Sodium 139 133 - 144 mmol/L Final    Potassium 4.0 3.4 - 5.3 mmol/L Final    Chloride 108 94 - 109 mmol/L Final    Carbon Dioxide 23 20 - 32 mmol/L Final    Anion Gap 8 3 - 14 mmol/L Final    Glucose 87 70 - 99 mg/dL Final    Urea  Nitrogen 9 7 - 30 mg/dL Final    Creatinine 0.67 0.52 - 1.04 mg/dL Final    GFR Estimate >90  Non  GFR Calc   >60 mL/min/1.7m2 Final    GFR Estimate If Black >90   GFR Calc   >60 mL/min/1.7m2 Final    Calcium 8.1 (L) 8.5 - 10.1 mg/dL Final    Bilirubin Total 0.2 0.2 - 1.3 mg/dL Final    Albumin 2.4 (L) 3.4 - 5.0 g/dL Final    Protein Total 6.5 (L) 6.8 - 8.8 g/dL Final    Alkaline Phosphatase 122 40 - 150 U/L Final    ALT 60 (H) 0 - 50 U/L Final    AST 58 (H) 0 - 45 U/L Final         6/11/2017 10:16 PM      Component Results     Component Value Ref Range & Units Status    Troponin I ES  0.000 - 0.045 ug/L Final    <0.015  The 99th percentile for upper reference range is 0.045 ug/L.  Troponin values in   the range of 0.045 - 0.120 ug/L may be associated with risks of adverse   clinical events.           6/11/2017 10:17 PM      Component Results     Component Value Ref Range & Units Status    N-Terminal Pro BNP Inpatient 208 0 - 450 pg/mL Final    Reference range shown and results flagged as abnormal are suggested inpatient   cut points for confirming diagnosis if CHF in an acute setting. Establishing   a   baseline value for each individual patient is useful for follow-up. An   inpatient or emergency department NT-proPBNP <300 pg/mL effectively rules out   acute CHF, with 99% negative predictive value.  The outpatient non-acute reference range for ruling out CHF is:   0-125 pg/mL (age 18 to less than 75)   0-450 pg/mL (age 75 yrs and older)           6/11/2017 10:52 PM      Narrative     CHEST TWO VIEWS  6/11/2017 10:03 PM     COMPARISON: None.    HISTORY: Dyspnea.    FINDINGS: The cardiac silhouette, pulmonary vasculature, lungs and  pleural spaces are within normal limits.        Impression     IMPRESSION: Clear lungs.    CHERELLE MONTIEL MD         6/11/2017 11:35 PM      Narrative     CT CHEST PULMONARY EMBOLISM W CONTRAST  6/11/2017 11:28 PM      HISTORY: Dyspnea, three days  postpartum.     TECHNIQUE: CT chest with intravenous contrast using the pulmonary  embolus protocol. Radiation dose for this scan was reduced using  automated exposure control, adjustment of the mA and/or kV according  to patient size, or iterative reconstruction technique. 81 mL  [id number].     COMPARISON: None.    FINDINGS: Evaluation of the pulmonary arterial system shows no  evidence of embolus. There is no aortic aneurysm or dissection. The  heart size is normal. No mediastinal, hilar or axillary lymph node  enlargement. There is minimal dependent atelectasis bilaterally. The  lungs are otherwise clear. No pneumothorax. Trace pleural effusions.  Images through the upper abdomen show no acute abnormality.        Impression     IMPRESSION:  1. There is no pulmonary embolus, aortic aneurysm or dissection.  2. Trace bilateral pleural effusions.         6/12/2017  1:00 AM      Narrative     US LOWER EXTREMITY VENOUS DUPLEX BILATERAL   6/12/2017 12:56 AM     HISTORY: Bilateral leg swelling. 3 days postpartum. Elevated d-dimer.    COMPARISON: None.    FINDINGS: Gray-scale, color and Doppler spectral analysis ultrasound  was performed of the legs. Compression and augmentation imaging was  performed.    There is no evidence for deep venous thrombosis. The veins compress  and augment normally.        Impression     IMPRESSION: No DVT.     NORMA GRANADOS MD         6/12/2017 12:11 AM      Component Results     Component Value Ref Range & Units Status    Color Urine Straw  Final    Appearance Urine Clear  Final    Glucose Urine Negative NEG mg/dL Final    Bilirubin Urine Negative NEG Final    Ketones Urine Negative NEG mg/dL Final    Specific Gravity Urine 1.016 1.003 - 1.035 Final    Blood Urine Trace (A) NEG Final    pH Urine 7.0 5.0 - 7.0 pH Final    Protein Albumin Urine Negative NEG mg/dL Final    Urobilinogen mg/dL Normal 0.0 - 2.0 mg/dL Final    Nitrite Urine Negative NEG Final    Leukocyte Esterase Urine Moderate  (A) NEG Final    Source Midstream Urine  Final    WBC Urine <1 0 - 2 /HPF Final    RBC Urine <1 0 - 2 /HPF Final                Thank you for choosing [facility]       Thank you for choosing [facility] for your care. Our goal is always to provide you with excellent care. Hearing back from our patients is one way we can continue to improve our services. Please take a few minutes to complete the written survey that you may receive in the mail after you visit with us. Thank you!        [facility] Information     [facility] gives you secure access to your electronic health record. If you see a primary care provider, you can also send messages to your care team and make appointments. If you have questions, please call your primary care clinic.  If you do not have a primary care provider, please call [phone] and they will assist you.        Care EveryWhere ID     This is your Care EveryWhere ID. This could be used by other organizations to access your [facility] medical records  [id number]        After Visit Summary       This is your record. Keep this with you and show to your community pharmacist(s) and doctor(s) at your next visit.

## 2017-06-12 ENCOUNTER — APPOINTMENT (OUTPATIENT)
Dept: ULTRASOUND IMAGING | Facility: CLINIC | Age: 32
End: 2017-06-12
Attending: FAMILY MEDICINE
Payer: COMMERCIAL

## 2017-06-12 VITALS
BODY MASS INDEX: 31.62 KG/M2 | TEMPERATURE: 98 F | WEIGHT: 190 LBS | SYSTOLIC BLOOD PRESSURE: 128 MMHG | OXYGEN SATURATION: 98 % | DIASTOLIC BLOOD PRESSURE: 85 MMHG

## 2017-06-12 LAB
ALBUMIN UR-MCNC: NEGATIVE MG/DL
APPEARANCE UR: CLEAR
BILIRUB UR QL STRIP: NEGATIVE
COLOR UR AUTO: ABNORMAL
GLUCOSE UR STRIP-MCNC: NEGATIVE MG/DL
HGB UR QL STRIP: ABNORMAL
KETONES UR STRIP-MCNC: NEGATIVE MG/DL
LEUKOCYTE ESTERASE UR QL STRIP: ABNORMAL
NITRATE UR QL: NEGATIVE
PH UR STRIP: 7 PH (ref 5–7)
RBC #/AREA URNS AUTO: <1 /HPF (ref 0–2)
SP GR UR STRIP: 1.02 (ref 1–1.03)
URN SPEC COLLECT METH UR: ABNORMAL
UROBILINOGEN UR STRIP-MCNC: NORMAL MG/DL (ref 0–2)
WBC #/AREA URNS AUTO: <1 /HPF (ref 0–2)

## 2017-06-12 PROCEDURE — 81001 URINALYSIS AUTO W/SCOPE: CPT | Performed by: EMERGENCY MEDICINE

## 2017-06-12 PROCEDURE — 93970 EXTREMITY STUDY: CPT

## 2017-06-12 NOTE — DISCHARGE INSTRUCTIONS
Coping with Edema  What is edema?  Edema is the build-up of fluid in the body, which causes swelling. Swelling most commonly occurs in the feet, ankles, lower legs or hands.  Swelling can occur in the belly or chest may be a sign of a more severe problem.  Certain medicines or conditions can make the swelling worse.  Symptoms include:    Feet and lower legs get larger when you sit or walk.    Hands feel tight when you make a fist.    When you push on the skin, skin stays dented.    Shiny, tight skin.    Fast weight gain.  How is it treated?  Your care team may give you a medicine to reduce the swelling.  They may also suggest that you meet with a dietitian. He or she can help with food choices to reduce the swelling.  What can I do about the swelling?    Place your feet above your heart 3 times a day: Sit with your feet up on a stool with a pillow. Sit on the bed or couch with two pillows under your feet.    Do not stand for long periods of time.    Wear loose-fitting clothes.    Do not cross your legs.    Reduce the salt in your diet.   These foods are high in salt:    Chips, soup    Frozen meals, TV dinners    Castaneda, lunch meat, ham    Sauces (soy, canned spaghetti sauce)    Walk or do other exercise.    Wear compression stockings.    Drink water as normal.    Weigh yourself every day at the same time to keep track of weight gain.  When should I call my care team?  Call your care team if:    You have a hard time breathing.    You gained 5 pounds or more in 1 week.    Your hands or feet feel cold when you touch them.    You are peeing very little or not at all.    Swelling is moving up your arms or legs.    Your tongue is swelling.    You cannot eat for more than a day  If you have any side effects, call us. We can help you manage these problems.  For more information,  see:  www.[facility].com  www.cancer.org/treatment/treatmentsandsideeffects/physicalsideeffects/dealingwithsymptomsathome  www.cancer.gov/cancertopics/coping/chemotherapy-and-you  Comments:  __________________________________________  __________________________________________  __________________________________________  __________________________________________  __________________________________________  __________________________________________  __________________________________________  For informational purposes only. Not to replace the advice of your health care provider.  Copyright   2014 [employer]. All rights reserved. [employer] 762609 - REV 03/16.  Recheck your blood work in 2 days and then follow-up in the clinic after blood work check.

## 2017-06-12 NOTE — ED PROVIDER NOTES
Emergency Department Patient Sign-out       Brief HPI:  This is a 32 year old female   signed out to me by Dr. Hendricks .  32-year-old who is 3 days postpartum from a full term normal delivery here for evaluation of lower respiratory swelling with exertional dyspnea.  The workup initially negative.  Lower extremity ultrasound pending.  Patient initially hypertensive at 145/93 which is improved while in the ED.  UA pending for evaluation of proteinuria.      See initial ED Provider note for details of the presentation.        Pending studies include duplex ultrasound of lower extremities and urine to evaluate for proteinuria.            Exam:   Patient Vitals for the past 24 hrs:   BP Temp Temp src Heart Rate SpO2 Weight   17 0124 128/85 - - - - -   17 2345 (!) 129/91 - - - - -   17 2145 - - - - 98 % -   17 2104 (!) 145/93 98  F (36.7  C) Oral 91 98 % 86.2 kg (190 lb)           ED RESULTS:   Results for orders placed or performed during the hospital encounter of 17 (from the past 24 hour(s))   CBC with platelets differential     Status: Abnormal    Collection Time: 17  9:30 PM   Result Value Ref Range    WBC 13.2 (H) 4.0 - 11.0 10e9/L    RBC Count 3.23 (L) 3.8 - 5.2 10e12/L    Hemoglobin 10.2 (L) 11.7 - 15.7 g/dL    Hematocrit 30.7 (L) 35.0 - 47.0 %    MCV 95 78 - 100 fl    MCH 31.6 26.5 - 33.0 pg    MCHC 33.2 31.5 - 36.5 g/dL    RDW 12.4 10.0 - 15.0 %    Platelet Count 370 150 - 450 10e9/L    Diff Method Automated Method     % Neutrophils 62.0 %    % Lymphocytes 24.8 %    % Monocytes 8.0 %    % Eosinophils 4.6 %    % Basophils 0.4 %    % Immature Granulocytes 0.2 %    Absolute Neutrophil 8.2 1.6 - 8.3 10e9/L    Absolute Lymphocytes 3.3 0.8 - 5.3 10e9/L    Absolute Monocytes 1.1 0.0 - 1.3 10e9/L    Absolute Eosinophils 0.6 0.0 - 0.7 10e9/L    Absolute Basophils 0.1 0.0 - 0.2 10e9/L    Abs Immature Granulocytes 0.0 0 - 0.4 10e9/L   D dimer quantitative     Status: Abnormal     Collection Time: 06/11/17  9:30 PM   Result Value Ref Range    D Dimer 0.9 (H) 0.0 - 0.50 ug/ml FEU   Comprehensive metabolic panel     Status: Abnormal    Collection Time: 06/11/17  9:30 PM   Result Value Ref Range    Sodium 139 133 - 144 mmol/L    Potassium 4.0 3.4 - 5.3 mmol/L    Chloride 108 94 - 109 mmol/L    Carbon Dioxide 23 20 - 32 mmol/L    Anion Gap 8 3 - 14 mmol/L    Glucose 87 70 - 99 mg/dL    Urea Nitrogen 9 7 - 30 mg/dL    Creatinine 0.67 0.52 - 1.04 mg/dL    GFR Estimate >90  Non  GFR Calc   >60 mL/min/1.7m2    GFR Estimate If Black >90   GFR Calc   >60 mL/min/1.7m2    Calcium 8.1 (L) 8.5 - 10.1 mg/dL    Bilirubin Total 0.2 0.2 - 1.3 mg/dL    Albumin 2.4 (L) 3.4 - 5.0 g/dL    Protein Total 6.5 (L) 6.8 - 8.8 g/dL    Alkaline Phosphatase 122 40 - 150 U/L    ALT 60 (H) 0 - 50 U/L    AST 58 (H) 0 - 45 U/L   Troponin I     Status: None    Collection Time: 06/11/17  9:30 PM   Result Value Ref Range    Troponin I ES  0.000 - 0.045 ug/L     <0.015  The 99th percentile for upper reference range is 0.045 ug/L.  Troponin values in   the range of 0.045 - 0.120 ug/L may be associated with risks of adverse   clinical events.     Nt probnp inpatient (BNP)     Status: None    Collection Time: 06/11/17  9:30 PM   Result Value Ref Range    N-Terminal Pro BNP Inpatient 208 0 - 450 pg/mL   XR Chest 2 Views     Status: None    Collection Time: 06/11/17 10:03 PM    Narrative    CHEST TWO VIEWS  6/11/2017 10:03 PM     COMPARISON: None.    HISTORY: Dyspnea.    FINDINGS: The cardiac silhouette, pulmonary vasculature, lungs and  pleural spaces are within normal limits.      Impression    IMPRESSION: Clear lungs.    CHERELLE MONTIEL MD   CT Chest Pulmonary Embolism w Contrast     Status: None (Preliminary result)    Collection Time: 06/11/17 11:28 PM    Narrative    CT CHEST PULMONARY EMBOLISM W CONTRAST  6/11/2017 11:28 PM      HISTORY: Dyspnea, three days postpartum.     TECHNIQUE: CT chest with  intravenous contrast using the pulmonary  embolus protocol. Radiation dose for this scan was reduced using  automated exposure control, adjustment of the mA and/or kV according  to patient size, or iterative reconstruction technique. 81 mL  Isovue-370.     COMPARISON: None.    FINDINGS: Evaluation of the pulmonary arterial system shows no  evidence of embolus. There is no aortic aneurysm or dissection. The  heart size is normal. No mediastinal, hilar or axillary lymph node  enlargement. There is minimal dependent atelectasis bilaterally. The  lungs are otherwise clear. No pneumothorax. Trace pleural effusions.  Images through the upper abdomen show no acute abnormality.      Impression    IMPRESSION:  1. There is no pulmonary embolus, aortic aneurysm or dissection.  2. Trace bilateral pleural effusions.   UA with Microscopic     Status: Abnormal    Collection Time: 06/12/17 12:00 AM   Result Value Ref Range    Color Urine Straw     Appearance Urine Clear     Glucose Urine Negative NEG mg/dL    Bilirubin Urine Negative NEG    Ketones Urine Negative NEG mg/dL    Specific Gravity Urine 1.016 1.003 - 1.035    Blood Urine Trace (A) NEG    pH Urine 7.0 5.0 - 7.0 pH    Protein Albumin Urine Negative NEG mg/dL    Urobilinogen mg/dL Normal 0.0 - 2.0 mg/dL    Nitrite Urine Negative NEG    Leukocyte Esterase Urine Moderate (A) NEG    Source Midstream Urine     WBC Urine <1 0 - 2 /HPF    RBC Urine <1 0 - 2 /HPF   US Lower Extremity Venous Duplex Bilateral     Status: None    Collection Time: 06/12/17 12:56 AM    Narrative    US LOWER EXTREMITY VENOUS DUPLEX BILATERAL   6/12/2017 12:56 AM     HISTORY: Bilateral leg swelling. 3 days postpartum. Elevated d-dimer.    COMPARISON: None.    FINDINGS: Gray-scale, color and Doppler spectral analysis ultrasound  was performed of the legs. Compression and augmentation imaging was  performed.    There is no evidence for deep venous thrombosis. The veins compress  and augment normally.       Impression    IMPRESSION: No DVT.     NORMA GRANADOS MD       ED MEDICATIONS:   Medications   iopamidol (ISOVUE-370) solution 81 mL (81 mLs Intravenous Given 6/11/17 2302)   sodium chloride 0.9 % for CT scan flush dose 104 mL (104 mLs Intravenous Given 6/11/17 2303)   ibuprofen (ADVIL/MOTRIN) tablet 600 mg (600 mg Oral Given 6/11/17 2327)     Patient Vitals for the past 24 hrs:   BP Temp Temp src Heart Rate SpO2 Weight   06/12/17 0124 128/85 - - - - -   06/11/17 2345 (!) 129/91 - - - - -   06/11/17 2145 - - - - 98 % -   06/11/17 2104 (!) 145/93 98  F (36.7  C) Oral 91 98 % 86.2 kg (190 lb)     1:04 AM: Urine shows no proteinuria.  Blood pressure remains mildly elevated at 129/91.  Ultrasound negative for DVT.   No neurologic symptoms.  LFTs mildly elevated.  Given the ongoing mild hypertension with abnormal LFTs, patient can be considered at risk for progressing to preeclampsia.  Will discuss case with OB with expectation for close follow-up and repeat testing.    1:12 AM: Discussed with on call OB Dr Burrell. Discussed my concern for possible early pre-eclampsia. She advises that with BP < 160/110 - no indication for treatment now. F/U in 2 days. Requests CBC, CMP to draw before clinic visit.  Patient advised of recommendation and she agrees to f/u as directed.     Impression:    ICD-10-CM    1. Bilateral edema of lower extremity R60.0 CBC with platelets differential     Comprehensive metabolic panel   2. Secondary hypertension I15.9        Plan:  F/u with OB in 2 days for re-evaluation of bloodwork, blood pressure, and lower extremity edema.        Aj Darling [facility], Aj Darling MD  06/12/17 1698

## 2017-06-12 NOTE — ED NOTES
Pt feeding baby - does appear to be short of breath at times - c/o calf discomfort and ankle swelling as well as shortness of breath at times. Patient is 3 days post partum - normal vag delivery.

## 2017-06-12 NOTE — ED PROVIDER NOTES
"  History     Chief Complaint   Patient presents with     Leg Swelling     bilat leg swelling and SOB     Shortness of Breath     HPI  Gisele Meadows is a 32 year old female who is 3 days postpartum from vaginal delivery of a term infant, vacuum-assisted for fetal decelerations without other complications who comes in because of dyspnea and edema.  Yesterday she began noticing bilateral foot swelling a bit more on the right than on the left.  She's had a sense of cramping in her calves bilaterally.  Today she began to feel short of breath \"like I am not getting enough air.\"  She has not had a cough.  She's had no chest pain.  She denies fevers.  She has not noticed rapid or irregular heartbeat.  She has a history of exercise-induced asthma but has not bothered her for many years.  She has not noticed wheezing.    I have reviewed the Medications, Allergies, Past Medical and Surgical History, and Social History in the Epic system.    Allergies:   Allergies   Allergen Reactions     Sulfa Drugs Hives         No current facility-administered medications on file prior to encounter.   Current Outpatient Prescriptions on File Prior to Encounter:  valACYclovir (VALTREX) 1000 mg tablet Take 1 tablet (1,000 mg) by mouth daily   ferrous gluconate (FERGON) 324 (38 FE) MG tablet Take 1 tablet (324 mg) by mouth 3 times daily (with meals)   albuterol (ALBUTEROL) 108 (90 BASE) MCG/ACT inhaler Inhale 1-2 puffs into the lungs Reported on 2017   Prenatal Vit-Fe Fumarate-FA (PRENATAL MULTIVITAMIN  PLUS IRON) 27-0.8 MG TABS Take 1 tablet by mouth daily       Patient Active Problem List   Diagnosis     Prenatal care, first pregnancy     Cervical intraepithelial neoplasia grade 2     Exercise-induced asthma     Twin pregnancy with fetal loss and retention of one fetus, antepartum     Recurrent cold sores     Internal hemorrhoid     Recurrent genital HSV (herpes simplex virus) infection     Supervision of normal first pregnancy      " "(normal spontaneous vaginal delivery)       Past Surgical History:   Procedure Laterality Date     LEEP TX, CERVICAL  9/5/14    BERENICE 1-3, Margins negative     MOUTH SURGERY      wisdom teeth     SURGICAL HISTORY OF -  Left     repair of tear of left hip area       Social History   Substance Use Topics     Smoking status: Former Smoker     Types: Cigarettes     Smokeless tobacco: Not on file     Alcohol use 0.0 oz/week     0 Standard drinks or equivalent per week      Comment: 5 drinks weekly- quit with pregnancy       Most Recent Immunizations   Administered Date(s) Administered     Influenza Vaccine IM 3yrs+ 4 Valent IIV4 10/03/2016     TDAP Vaccine (Adacel) 03/22/2017       BMI: Estimated body mass index is 31.62 kg/(m^2) as calculated from the following:    Height as of 6/7/17: 1.651 m (5' 5\").    Weight as of this encounter: 86.2 kg (190 lb).      Review of Systems  All other systems are reviewed and are negative    Physical Exam   BP: (!) 145/93  Heart Rate: 91  Temp: 98  F (36.7  C)  Weight: 86.2 kg (190 lb)  SpO2: 98 %  Physical Exam  Nursing note and vitals were reviewed.  Constitutional: Awake and alert, fatigued but not ill or toxic appearing 32-year-old no acute distress, who does not appear acutely ill, and who answers questions appropriately and cooperates with examination.  HEENT: EOMI.  Neck: Freely mobile.  Cardiovascular: Cardiac examination reveals normal heart rate and regular rhythm without murmur.  Pulmonary/Chest: Breathing is unlabored.  Breath sounds are clear and equal bilaterally.  There no retractions, tachypnea, rales, wheezes, or rhonchi.  Abdomen: Soft, nontender, no HSM or masses rebound or guarding.  Musculoskeletal: Extremities are warm and well-perfused.  Bilateral 1+ edema of the feet right greater than left.  No calf tenderness.  Neurological: Alert, oriented, thought content logical, coherent   Skin: Warm, dry, no rashes.  Psychiatric: Affect broad and appropriate.      ED Course "     ED Course     Procedures          Critical Care time:  none            Results for orders placed or performed during the hospital encounter of 06/11/17 (from the past 24 hour(s))   CBC with platelets differential   Result Value Ref Range    WBC 13.2 (H) 4.0 - 11.0 10e9/L    RBC Count 3.23 (L) 3.8 - 5.2 10e12/L    Hemoglobin 10.2 (L) 11.7 - 15.7 g/dL    Hematocrit 30.7 (L) 35.0 - 47.0 %    MCV 95 78 - 100 fl    MCH 31.6 26.5 - 33.0 pg    MCHC 33.2 31.5 - 36.5 g/dL    RDW 12.4 10.0 - 15.0 %    Platelet Count 370 150 - 450 10e9/L    Diff Method Automated Method     % Neutrophils 62.0 %    % Lymphocytes 24.8 %    % Monocytes 8.0 %    % Eosinophils 4.6 %    % Basophils 0.4 %    % Immature Granulocytes 0.2 %    Absolute Neutrophil 8.2 1.6 - 8.3 10e9/L    Absolute Lymphocytes 3.3 0.8 - 5.3 10e9/L    Absolute Monocytes 1.1 0.0 - 1.3 10e9/L    Absolute Eosinophils 0.6 0.0 - 0.7 10e9/L    Absolute Basophils 0.1 0.0 - 0.2 10e9/L    Abs Immature Granulocytes 0.0 0 - 0.4 10e9/L   D dimer quantitative   Result Value Ref Range    D Dimer 0.9 (H) 0.0 - 0.50 ug/ml FEU   Comprehensive metabolic panel   Result Value Ref Range    Sodium 139 133 - 144 mmol/L    Potassium 4.0 3.4 - 5.3 mmol/L    Chloride 108 94 - 109 mmol/L    Carbon Dioxide 23 20 - 32 mmol/L    Anion Gap 8 3 - 14 mmol/L    Glucose 87 70 - 99 mg/dL    Urea Nitrogen 9 7 - 30 mg/dL    Creatinine 0.67 0.52 - 1.04 mg/dL    GFR Estimate >90  Non  GFR Calc   >60 mL/min/1.7m2    GFR Estimate If Black >90   GFR Calc   >60 mL/min/1.7m2    Calcium 8.1 (L) 8.5 - 10.1 mg/dL    Bilirubin Total 0.2 0.2 - 1.3 mg/dL    Albumin 2.4 (L) 3.4 - 5.0 g/dL    Protein Total 6.5 (L) 6.8 - 8.8 g/dL    Alkaline Phosphatase 122 40 - 150 U/L    ALT 60 (H) 0 - 50 U/L    AST 58 (H) 0 - 45 U/L   Troponin I   Result Value Ref Range    Troponin I ES  0.000 - 0.045 ug/L     <0.015  The 99th percentile for upper reference range is 0.045 ug/L.  Troponin values in   the  range of 0.045 - 0.120 ug/L may be associated with risks of adverse   clinical events.     Nt probnp inpatient (BNP)   Result Value Ref Range    N-Terminal Pro BNP Inpatient 208 0 - 450 pg/mL   XR Chest 2 Views    Narrative    CHEST TWO VIEWS  6/11/2017 10:03 PM     COMPARISON: None.    HISTORY: Dyspnea.    FINDINGS: The cardiac silhouette, pulmonary vasculature, lungs and  pleural spaces are within normal limits.      Impression    IMPRESSION: Clear lungs.     22:40: Test results reviewed.  They're all reassuring with the exception of the elevated d-dimer.  Given the presence of new edema, calf pain, and dyspnea in the setting of recent postpartum state, I recommended we proceed with CT scan to test for pulmonary embolism.  She is in agreement with this plan.  If this is negative we will proceed to lower extremity ultrasound to look for DVT.    Assessments & Plan (with Medical Decision Making)     31 yo female 3 days post partum presents with dyspnea, lower extremity edema and calf cramps. Examination is normal. Lab testing also normal except elevated D-Dimer. Chest CT normal. At shift change care transferred to Dr. Whitfield awaiting results of LE venous US to look for DVT. If neg, we not recommend further testing and just monitor symptoms. Suspect no serious cause for symptoms if neg US and just adjustment to post partum state, but re-evaluate if symptoms persist, worsen, or new concerning symptoms  Develop.    I have reviewed the nursing notes.    I have reviewed the findings, diagnosis, plan and need for follow up with the patient.      New Prescriptions    No medications on file       Final diagnoses:   Bilateral edema of lower extremity   Secondary hypertension       6/11/2017   [facility] EMERGENCY DEPARTMENT     Charlie Hendricks MD  06/15/17 [id number]

## 2017-06-12 NOTE — ED NOTES
Pt is 3 days out from delivery of normal birth, she noticed yesterday her feet started swelling and now today she is SOB which is worse with activity

## 2017-06-12 NOTE — TELEPHONE ENCOUNTER
Caller is  Two days post partum NVD and today noted swelling of both feet an ankles  left>R and has  shortness of breath on exertion but no chest pain.  Reason for Disposition    [1] Difficulty breathing AND [2] new onset or worsening    Additional Information    Negative: Severe difficulty breathing (e.g., struggling for each breath, speaks in single words)    Negative: Sounds like a life-threatening emergency to the triager    Negative: Followed a leg injury    Negative: [1] Small area of swelling AND [2] followed an insect bite to the area    Negative: Swelling localized to ankle joint    Negative: Swelling localized to knee joint    Protocols used: POSTPARTUM - LEG SWELLING AND EDEMA-ADULT-AH  Bushra Garcia RN  FNA

## 2017-06-14 ENCOUNTER — OFFICE VISIT (OUTPATIENT)
Dept: OBGYN | Facility: CLINIC | Age: 32
End: 2017-06-14
Payer: COMMERCIAL

## 2017-06-14 VITALS
SYSTOLIC BLOOD PRESSURE: 131 MMHG | WEIGHT: 186.8 LBS | BODY MASS INDEX: 31.12 KG/M2 | TEMPERATURE: 98.1 F | HEART RATE: 79 BPM | DIASTOLIC BLOOD PRESSURE: 82 MMHG | HEIGHT: 65 IN

## 2017-06-14 DIAGNOSIS — G89.18 EPISIOTOMY PAIN: ICD-10-CM

## 2017-06-14 DIAGNOSIS — R06.02 SOB (SHORTNESS OF BREATH): Primary | ICD-10-CM

## 2017-06-14 DIAGNOSIS — R74.8 ELEVATED LIVER ENZYMES: ICD-10-CM

## 2017-06-14 LAB
ALBUMIN SERPL-MCNC: 2.8 G/DL (ref 3.4–5)
ALP SERPL-CCNC: 122 U/L (ref 40–150)
ALT SERPL W P-5'-P-CCNC: 72 U/L (ref 0–50)
ANION GAP SERPL CALCULATED.3IONS-SCNC: 7 MMOL/L (ref 3–14)
AST SERPL W P-5'-P-CCNC: 46 U/L (ref 0–45)
BASOPHILS # BLD AUTO: 0.1 10E9/L (ref 0–0.2)
BASOPHILS NFR BLD AUTO: 0.8 %
BILIRUB SERPL-MCNC: 0.2 MG/DL (ref 0.2–1.3)
BUN SERPL-MCNC: 14 MG/DL (ref 7–30)
CALCIUM SERPL-MCNC: 8.7 MG/DL (ref 8.5–10.1)
CHLORIDE SERPL-SCNC: 105 MMOL/L (ref 94–109)
CO2 SERPL-SCNC: 26 MMOL/L (ref 20–32)
CREAT SERPL-MCNC: 0.79 MG/DL (ref 0.52–1.04)
DIFFERENTIAL METHOD BLD: ABNORMAL
EOSINOPHIL # BLD AUTO: 0.6 10E9/L (ref 0–0.7)
EOSINOPHIL NFR BLD AUTO: 5.4 %
ERYTHROCYTE [DISTWIDTH] IN BLOOD BY AUTOMATED COUNT: 12.4 % (ref 10–15)
GFR SERPL CREATININE-BSD FRML MDRD: 84 ML/MIN/1.7M2
GLUCOSE SERPL-MCNC: 102 MG/DL (ref 70–99)
HCT VFR BLD AUTO: 33 % (ref 35–47)
HGB BLD-MCNC: 11.1 G/DL (ref 11.7–15.7)
LYMPHOCYTES # BLD AUTO: 3 10E9/L (ref 0.8–5.3)
LYMPHOCYTES NFR BLD AUTO: 28.6 %
MCH RBC QN AUTO: 32.7 PG (ref 26.5–33)
MCHC RBC AUTO-ENTMCNC: 33.6 G/DL (ref 31.5–36.5)
MCV RBC AUTO: 97 FL (ref 78–100)
MONOCYTES # BLD AUTO: 0.8 10E9/L (ref 0–1.3)
MONOCYTES NFR BLD AUTO: 7.8 %
NEUTROPHILS # BLD AUTO: 6 10E9/L (ref 1.6–8.3)
NEUTROPHILS NFR BLD AUTO: 57.4 %
PLATELET # BLD AUTO: 411 10E9/L (ref 150–450)
POTASSIUM SERPL-SCNC: 4.3 MMOL/L (ref 3.4–5.3)
PROT SERPL-MCNC: 7.1 G/DL (ref 6.8–8.8)
RBC # BLD AUTO: 3.39 10E12/L (ref 3.8–5.2)
SODIUM SERPL-SCNC: 138 MMOL/L (ref 133–144)
WBC # BLD AUTO: 10.5 10E9/L (ref 4–11)

## 2017-06-14 PROCEDURE — 36415 COLL VENOUS BLD VENIPUNCTURE: CPT | Performed by: OBSTETRICS & GYNECOLOGY

## 2017-06-14 PROCEDURE — 85025 COMPLETE CBC W/AUTO DIFF WBC: CPT | Performed by: OBSTETRICS & GYNECOLOGY

## 2017-06-14 PROCEDURE — 99213 OFFICE O/P EST LOW 20 MIN: CPT | Mod: 24 | Performed by: OBSTETRICS & GYNECOLOGY

## 2017-06-14 PROCEDURE — 80053 COMPREHEN METABOLIC PANEL: CPT | Performed by: OBSTETRICS & GYNECOLOGY

## 2017-06-14 NOTE — PROGRESS NOTES
"32 year old   PPD #6, seen in the ER  with sob.  She had a normal workup for a PE.  Since then her swelling has markedly improved and so has her breathing.  She occasionally still gets winded with walking but no cough or chest pain.  Had a headache yesterday resolved with tylenol.  No visual changes.  Breastfeeding is going well.  Scant lochia.  No fever/chills. Her episiotomy site was fine but just started hurting a bit more yesterday and wanted it checked as she was here.      Delivery note  32 year old  39w4d admitted in active labor, SROM meconium, epidural, Vacuum assisted for fetal bradycardia/decelerations over a right mediolateral episiotomy, placenta/spont/3vessel/intact with pitocin at cord clamp, right mediolateral episiotomy repaired with 3-0 monocryl, 2-0 vicryl in layers, cervix/rectum intact, . No complications  Anuja Jauregui MD        Past Medical History:   Diagnosis Date     Chickenpox      Genital herpes      Seizure (H)     x2 at age 20     Past Surgical History:   Procedure Laterality Date     LEEP TX, CERVICAL  14    BERENICE 1-3, Margins negative     MOUTH SURGERY      wisdom teeth     SURGICAL HISTORY OF -  Left     repair of tear of left hip area     meds-reviewed   ROS: 10 point ROS neg other than the symptoms noted above in the HPI.  /82 (BP Location: Left arm, Patient Position: Chair, Cuff Size: Adult Regular)  Pulse 79  Temp 98.1  F (36.7  C) (Oral)  Ht 5' 5\" (1.651 m)  Wt 186 lb 12.8 oz (84.7 kg)  LMP 2016  BMI 31.09 kg/m2  Alert and orientedx3, in NAD  Lungs-CTA bl  CV-RRR  Abdomen-soft,nontender  Pelvic exam: episiotomy intact with sutures, no erythema, no edema, good granulation tissue  ASSESSMENT / PLAN:  (R06.02) SOB (shortness of breath)  (primary encounter diagnosis)  Comment: normal CT scan and lower extremity dopplers.  Normal exam today  Plan: CBC with platelets and differential,         Comprehensive metabolic panel (BMP + Alb, " Alk         Phos, ALT, AST, Total. Bili, TP), US Abdomen         Limited, AST, ALT        Improving.  Let us know if worsens or doesn't continue to improve    (R74.8) Elevated liver enzymes  Comment: stable over the last two checks  Plan: no other evidence of pre-eclampsia,  Other labs normal. Had some intermittent borderline elevated blood pressures during the later part of her pregnancy.  Repeat them weekly until improved.  Right upper quadrant US.  Avoid tylenol.      (O90.89,  G89.18) Episiotomy pain  Comment: healing well  Plan: sitz baths and reassured.  Let us know if pain worsens, if does may just give a course of augmentin for prophylaxis    Anuja Jauregui MD

## 2017-06-14 NOTE — MR AVS SNAPSHOT
After Visit Summary   6/14/2017    Gisele Meadows    MRN: [id number]           Patient Information     Date Of Birth          1985        Visit Information        Provider Department      6/14/2017 9:45 AM Anuja Jauregui MD [facility]        Today's Diagnoses     SOB (shortness of breath)    -  1    Elevated liver enzymes        Episiotomy pain        Routine postpartum follow-up           Follow-ups after your visit        Your next 10 appointments already scheduled     Jun 19, 2017  9:30 AM CDT   US ABDOMEN LIMITED with [id number] [place] [address] Ultrasound ([facility])    [address] [facility] [address]   [phone]           Please bring a list of your medicines (including vitamins, minerals and over-the-counter drugs). Also, tell your doctor about any allergies you may have. Wear comfortable clothes and leave your valuables at home.  Adults: No eating or drinking for 8 hours before the exam. You may take medicine with a small sip of water.  Children: - Children 6+ years: No food or drink for 6 hours before exam. - Children 1-5 years: No food or drink for 4 hours before exam. - Infants, breast-fed: may have breast milk up to 2 hours before exam. - Infants, formula: may have bottle until 4 hours before exam.  Please call the Imaging Department at your exam site with any questions.            Jul 20, 2017  9:30 AM CDT   [facility] with [facility] Burrell [facility]   [facility]    [address] [address]   [phone]              Future tests that were ordered for you today     Open Standing Orders        Priority Remaining Interval Expires Ordered    AST Routine 5/5 9/14/2017 6/14/2017    ALT Routine 5/5  9/14/2017 6/14/2017          Open Future Orders        Priority Expected Expires Ordered    US Abdomen Limited Routine  6/14/2018 6/14/2017            Who to contact     If  "you have questions or need follow up information about today's clinic visit or your schedule please contact [facility] directly at [phone].  Normal or non-critical lab and imaging results will be communicated to you by MyChart, letter or phone within 4 business days after the clinic has received the results. If you do not hear from us within 7 days, please contact the clinic through Metaforichart or phone. If you have a critical or abnormal lab result, we will notify you by phone as soon as possible.  Submit refill requests through wali or call your pharmacy and they will forward the refill request to us. Please allow 3 business days for your refill to be completed.          Additional Information About Your Visit        MetaforicharNuventix Information     wali gives you secure access to your electronic health record. If you see a primary care provider, you can also send messages to your care team and make appointments. If you have questions, please call your primary care clinic.  If you do not have a primary care provider, please call [phone] and they will assist you.        Care EveryWhere ID     This is your Care EveryWhere ID. This could be used by other organizations to access your [place] medical records  [id number]        Your Vitals Were     Pulse Temperature Height Last Period BMI (Body Mass Index)       79 98.1  F (36.7  C) (Oral) 5' 5\" (1.651 m) 09/04/2016 31.09 kg/m2        Blood Pressure from Last 3 Encounters:   06/14/17 131/82   06/12/17 128/85   06/10/17 125/75    Weight from Last 3 Encounters:   06/14/17 186 lb 12.8 oz (84.7 kg)   06/11/17 190 lb (86.2 kg)   06/07/17 200 lb (90.7 kg)              We Performed the Following     CBC with platelets and differential     Comprehensive metabolic panel (BMP + Alb, Alk Phos, ALT, AST, Total. Bili, TP)          Today's Medication Changes          These changes are accurate as of: 6/14/17 10:57 AM.  If you have any questions, ask your nurse or " doctor.               Stop taking these medicines if you haven't already. Please contact your care team if you have questions.     albuterol 108 (90 BASE) MCG/ACT Inhaler   Generic drug:  albuterol   Stopped by:  Anuja Jauregui MD                    Primary Care Provider Office Phone #    [facility] [phone] [address] [facility] [address]        Thank you!     Thank you for choosing [facility]  for your care. Our goal is always to provide you with excellent care. Hearing back from our patients is one way we can continue to improve our services. Please take a few minutes to complete the written survey that you may receive in the mail after your visit with us. Thank you!             Your Updated Medication List - Protect others around you: Learn how to safely use, store and throw away your medicines at www.disposemymeds.org.          This list is accurate as of: 6/14/17 10:57 AM.  Always use your most recent med list.                   Brand Name Dispense Instructions for use    ferrous gluconate 324 (38 FE) MG tablet    FERGON    100 tablet    Take 1 tablet (324 mg) by mouth 3 times daily (with meals)       prenatal multivitamin  plus iron 27-0.8 MG Tabs per tablet      Take 1 tablet by mouth daily       valACYclovir 1000 mg tablet    VALTREX    60 tablet    Take 1 tablet (1,000 mg) by mouth daily

## 2017-06-14 NOTE — NURSING NOTE
"Chief Complaint   Patient presents with     Post Partum Exam       Initial /82  Pulse 79  Ht 5' 5\" (1.651 m)  Wt 186 lb 12.8 oz (84.7 kg)  LMP 09/04/2016  BMI 31.09 kg/m2 Estimated body mass index is 31.09 kg/(m^2) as calculated from the following:    Height as of this encounter: 5' 5\" (1.651 m).    Weight as of this encounter: 186 lb 12.8 oz (84.7 kg).  Medication Reconciliation: complete     Lois King LPN      "

## 2017-06-15 ASSESSMENT — PATIENT HEALTH QUESTIONNAIRE - PHQ9: SUM OF ALL RESPONSES TO PHQ QUESTIONS 1-9: 1

## 2017-06-19 ENCOUNTER — HOSPITAL ENCOUNTER (OUTPATIENT)
Dept: ULTRASOUND IMAGING | Facility: CLINIC | Age: 32
Discharge: HOME | End: 2017-06-19
Attending: OBSTETRICS & GYNECOLOGY | Admitting: OBSTETRICS & GYNECOLOGY
Payer: COMMERCIAL

## 2017-06-19 DIAGNOSIS — R06.02 SOB (SHORTNESS OF BREATH): ICD-10-CM

## 2017-06-19 LAB
ALT SERPL W P-5'-P-CCNC: 40 U/L (ref 0–50)
AST SERPL W P-5'-P-CCNC: 19 U/L (ref 0–45)

## 2017-06-19 PROCEDURE — 76705 ECHO EXAM OF ABDOMEN: CPT

## 2017-06-19 PROCEDURE — 84450 TRANSFERASE (AST) (SGOT): CPT | Performed by: OBSTETRICS & GYNECOLOGY

## 2017-06-19 PROCEDURE — 84460 ALANINE AMINO (ALT) (SGPT): CPT | Performed by: OBSTETRICS & GYNECOLOGY

## 2017-06-19 PROCEDURE — 36415 COLL VENOUS BLD VENIPUNCTURE: CPT | Performed by: OBSTETRICS & GYNECOLOGY

## 2017-07-20 ENCOUNTER — PRENATAL OFFICE VISIT (OUTPATIENT)
Dept: OBGYN | Facility: CLINIC | Age: 32
End: 2017-07-20
Payer: COMMERCIAL

## 2017-07-20 VITALS
SYSTOLIC BLOOD PRESSURE: 112 MMHG | WEIGHT: 178.6 LBS | HEIGHT: 65 IN | DIASTOLIC BLOOD PRESSURE: 69 MMHG | HEART RATE: 86 BPM | BODY MASS INDEX: 29.76 KG/M2

## 2017-07-20 DIAGNOSIS — Z30.430 ENCOUNTER FOR INSERTION OF MIRENA IUD: ICD-10-CM

## 2017-07-20 PROBLEM — Z34.00 SUPERVISION OF NORMAL FIRST PREGNANCY: Status: RESOLVED | Noted: 2017-06-08 | Resolved: 2017-07-20

## 2017-07-20 PROCEDURE — 99207 ZZC POST PARTUM EXAM: CPT | Performed by: OBSTETRICS & GYNECOLOGY

## 2017-07-20 PROCEDURE — 58300 INSERT INTRAUTERINE DEVICE: CPT | Performed by: OBSTETRICS & GYNECOLOGY

## 2017-07-20 NOTE — MR AVS SNAPSHOT
"              After Visit Summary   7/20/2017    Gisele Meadows    MRN: [id number]           Patient Information     Date Of Birth          1985        Visit Information        Provider Department      7/20/2017 9:30 AM Char Burrell MD [facility]        Today's Diagnoses     Routine postpartum follow-up    -  1    mirena IUD           Follow-ups after your visit        Who to contact     If you have questions or need follow up information about today's clinic visit or your schedule please contact [facility] directly at [phone].  Normal or non-critical lab and imaging results will be communicated to you by realSociablehart, letter or phone within 4 business days after the clinic has received the results. If you do not hear from us within 7 days, please contact the clinic through H2scant or phone. If you have a critical or abnormal lab result, we will notify you by phone as soon as possible.  Submit refill requests through Ahorro Libre or call your pharmacy and they will forward the refill request to us. Please allow 3 business days for your refill to be completed.          Additional Information About Your Visit        MyChart Information     Ahor[employer] Libre gives you secure access to your electronic health record. If you see a primary care provider, you can also send messages to your care team and make appointments. If you have questions, please call your primary care clinic.  If you do not have a primary care provider, please call [phone] and they will assist you.        Care EveryWhere ID     This is your Care EveryWhere ID. This could be used by other organizations to access your [place] medical records  [id number]        Your Vitals Were     Pulse Height Last Period Breastfeeding? BMI (Body Mass Index)       86 5' 5\" (1.651 m) 09/04/2016 Yes 29.72 kg/m2        Blood Pressure from Last 3 Encounters:   07/20/17 112/69   06/14/17 131/82   06/12/17 128/85    Weight from Last 3 " Encounters:   07/20/17 178 lb 9.6 oz (81 kg)   06/14/17 186 lb 12.8 oz (84.7 kg)   06/11/17 190 lb (86.2 kg)              We Performed the Following     HC LEVONORGESTREL IU 52MG 5 YR     INSERTION INTRAUTERINE DEVICE          Today's Medication Changes          These changes are accurate as of: 7/20/17 10:37 AM.  If you have any questions, ask your nurse or doctor.               Start taking these medicines.        Dose/Directions    levonorgestrel 20 MCG/24HR IUD   Commonly known as:  MIRENA   Used for:  Encounter for insertion of mirena IUD   Started by:  Char Burrell MD        Dose:  1 each   1 each (20 mcg) by Intrauterine route continuous   Refills:  0            Where to get your medicines      Some of these will need a paper prescription and others can be bought over the counter.  Ask your nurse if you have questions.     You don't need a prescription for these medications     levonorgestrel 20 MCG/24HR IUD                Primary Care Provider Office Phone #    [facility] [phone] [address] [facility] [address]        Equal Access to Services     CLIFF LAMA AH: Hadii shaq ku hadasho Soomaali, waaxda luqadaha, qaybta kaalmada adeegyada, danielle guthrie. So [facility] [phone].    ATENCIÓN: Si habla español, tiene a saavedra disposición servicios gratuitos de asistencia lingüística. Llame al [phone].    We comply with applicable federal civil rights laws and [place] laws. We do not discriminate on the basis of race, color, national origin, age, disability sex, sexual orientation or gender identity.            Thank you!     Thank you for choosing [facility]  for your care. Our goal is always to provide you with excellent care. Hearing back from our patients is one way we can continue to improve our services. Please take a few minutes to complete the written survey that you may receive in the mail after your visit with  us. Thank you!             Your Updated Medication List - Protect others around you: Learn how to safely use, store and throw away your medicines at www.disposemymeds.org.          This list is accurate as of: 7/20/17 10:37 AM.  Always use your most recent med list.                   Brand Name Dispense Instructions for use Diagnosis    ferrous gluconate 324 (38 FE) MG tablet    FERGON    100 tablet    Take 1 tablet (324 mg) by mouth 3 times daily (with meals)    Hemorrhage of rectum and anus, Prenatal care, first pregnancy, second trimester, Uterine size date discrepancy, second trimester       levonorgestrel 20 MCG/24HR IUD    MIRENA     1 each (20 mcg) by Intrauterine route continuous    Encounter for insertion of mirena IUD       prenatal multivitamin  plus iron 27-0.8 MG Tabs per tablet      Take 1 tablet by mouth daily        valACYclovir 1000 mg tablet    VALTREX    60 tablet    Take 1 tablet (1,000 mg) by mouth daily    Prenatal care, first pregnancy, third trimester, Genital herpes affecting pregnancy in third trimester

## 2017-07-20 NOTE — NURSING NOTE
"Chief Complaint   Patient presents with     Post Partum Exam       Initial /69 (BP Location: Right arm, Patient Position: Chair, Cuff Size: Adult Regular)  Pulse 86  Ht 5' 5\" (1.651 m)  Wt 178 lb 9.6 oz (81 kg)  LMP 09/04/2016  Breastfeeding? Yes  BMI 29.72 kg/m2 Estimated body mass index is 29.72 kg/(m^2) as calculated from the following:    Height as of this encounter: 5' 5\" (1.651 m).    Weight as of this encounter: 178 lb 9.6 oz (81 kg).  Medication Reconciliation: complete   Jackie Rodriguez CMA      "

## 2017-07-20 NOTE — PROGRESS NOTES
"  SUBJECTIVE:                                                   CC:  Patient presents with:  Post Partum Exam      HPI:  Gisele Meadows is a 32 year old  here for postpartum visit.  Breastfeeding.  No period yet.  No intercourse yet.  Healed well on perineum.  No bowels/bladder issues.  Desires IUD.  PHQ9 score of 0.      ROS: 10 point ROS negative other than as listed above in HPI.    Gyn History:  Patient's last menstrual period was 2016.       PMH, PSH, Soc Hx, Meds, and allergies reviewed in Epic.    OBJECTIVE:     /69 (BP Location: Right arm, Patient Position: Chair, Cuff Size: Adult Regular)  Pulse 86  Ht 5' 5\" (1.651 m)  Wt 178 lb 9.6 oz (81 kg)  LMP 2016  Breastfeeding? Yes  BMI 29.72 kg/m2    Gen: Healthy appearing female, no acute distress, comfortable  HENT: No scleral injection or icterus  CV: Regular rate  Resp: Normal work of breathing, no cough  Skin: No suspicious lesions or rashes  Psychiatric: mentation appears normal and affect bright    : Normal external female genitalia.  No external lesions, normal hair distribution.   SSE: Speculum exam reveals vaginal epithelium well rugated with normal physiologic discharge. Cervix appears smooth, pink, with no visible lesions.   Bimanual exam reveals normal size uterus, non-tender, and mobile. No adnexal masses or tenderness. No cervical motion tenderness.     IUD Insertion Procedure Note  Gisele Meadows   1985  MRN [id number]    The patient was counseled on the risks, benefits, and alternatives of the procedure. Verbal and written consent were obtained.    The patient was placed in the dorsal lithotomy position.  A bimanual exam revealed an anteverted uterus.  A speculum was inserted without difficulty. The cervix was cleaned with betadine.  A tenaculum was placed on the anterior lip of the cervix. The uterus was then sounded to 7.5cm using the endometrial pipelle. A Mirena IUD was inserted in a sterile " fashion and placed in the uterus with a 3cm tail. The patient tolerated the procedure with no complications.     Char Burrell MD, MPH  Obstetrics and Gynecology         ASSESSMENT/PLAN:                                                      1. Routine postpartum follow-up  Doing well, see HPI.  Had elevated BP in pregnancy with transaminitis, today BP is normal and she no longer has SOB.  Continue to monitor clinically.    2. mirena IUD  The patient was instructed to return to clinic in three to four weeks to check the length of the strings if she could not feel them herself at home. Also instructed to call with symptoms of infection such as a fever, heavy bleeding, or severe pain not controlled with over the counter medication. She was advised to use ibuprofen as needed for mild to moderate pain.  She was counseled that the IUD does not protect against STIs and that she will need to have a new device placed in 5 years.  All pt questions were answered.    - HC LEVONORGESTREL IU 52MG 5 YR  - levonorgestrel (MIRENA) 20 MCG/24HR IUD; 1 each (20 mcg) by Intrauterine route continuous  - INSERTION INTRAUTERINE DEVICE        Char Burrell MD, MPH  Obstetrics and Gynecology

## 2017-07-21 ASSESSMENT — PATIENT HEALTH QUESTIONNAIRE - PHQ9: SUM OF ALL RESPONSES TO PHQ QUESTIONS 1-9: 0

## 2017-08-03 ENCOUNTER — TELEPHONE (OUTPATIENT)
Dept: OBGYN | Facility: CLINIC | Age: 32
End: 2017-08-03

## 2017-08-03 NOTE — TELEPHONE ENCOUNTER
Forms were signed and mailed then sent to be scanned and copy at .    Adele Coronelton  [facility] [place]

## 2017-08-03 NOTE — TELEPHONE ENCOUNTER
Reason for Call:  Form, our goal is to have forms completed with 72 hours, however, some forms may require a visit or additional information.    Type of letter, form or note:  employer    Who is the form from?: Patient    Where did the form come from: Patient or family brought in       What clinic location was the form placed at?: [facility]    Where the form was placed: Given to physician    What number is listed as a contact on the form?: [phone]       Additional comments: mail forms to address on the form.    Call taken on 8/3/2017 at 10:08 AM by Adele Velez

## 2017-09-26 ENCOUNTER — TRANSFERRED RECORDS (OUTPATIENT)
Dept: HEALTH INFORMATION MANAGEMENT | Facility: CLINIC | Age: 32
End: 2017-09-26

## 2018-03-26 ENCOUNTER — OFFICE VISIT (OUTPATIENT)
Dept: LAB | Facility: SCHOOL | Age: 33
End: 2018-03-26
Payer: COMMERCIAL

## 2018-03-26 VITALS
WEIGHT: 176.4 LBS | DIASTOLIC BLOOD PRESSURE: 68 MMHG | SYSTOLIC BLOOD PRESSURE: 116 MMHG | HEART RATE: 108 BPM | BODY MASS INDEX: 29.39 KG/M2 | HEIGHT: 65 IN | OXYGEN SATURATION: 98 % | RESPIRATION RATE: 16 BRPM | TEMPERATURE: 98.4 F

## 2018-03-26 DIAGNOSIS — J06.9 VIRAL URI: Primary | ICD-10-CM

## 2018-03-26 DIAGNOSIS — R07.0 THROAT PAIN: ICD-10-CM

## 2018-03-26 LAB — S PYO AG THROAT QL IA.RAPID: NEGATIVE

## 2018-03-26 PROCEDURE — 87081 CULTURE SCREEN ONLY: CPT | Performed by: NURSE PRACTITIONER

## 2018-03-26 PROCEDURE — 87880 STREP A ASSAY W/OPTIC: CPT

## 2018-03-26 PROCEDURE — 99213 OFFICE O/P EST LOW 20 MIN: CPT

## 2018-03-26 RX ORDER — FLUTICASONE PROPIONATE 50 MCG
1 SPRAY, SUSPENSION (ML) NASAL 2 TIMES DAILY
Qty: 1 BOTTLE | Refills: 0 | Status: SHIPPED | OUTPATIENT
Start: 2018-03-26 | End: 2019-09-25

## 2018-03-26 NOTE — NURSING NOTE
"Chief Complaint   Patient presents with     Cold Symptoms       Initial /68 (BP Location: Right arm, Patient Position: Sitting, Cuff Size: Adult Large)  Pulse 108  Temp 98.4  F (36.9  C) (Tympanic)  Resp 16  Ht 5' 5\" (1.651 m)  Wt 176 lb 6.4 oz (80 kg)  SpO2 98%  BMI 29.35 kg/m2 Estimated body mass index is 29.35 kg/(m^2) as calculated from the following:    Height as of this encounter: 5' 5\" (1.651 m).    Weight as of this encounter: 176 lb 6.4 oz (80 kg).  Medication Reconciliation: complete  "

## 2018-03-26 NOTE — PROGRESS NOTES
SUBJECTIVE:   Gisele Meadows is a 32 year old female who presents to clinic today for the following health issues:      ENT Symptoms             Symptoms: cc Present Absent Comment   Fever/Chills   x    Fatigue  x     Muscle Aches  x  Neck is sore   Eye Irritation   x    Sneezing  x     Nasal Sai/Drg  x  Yellow drainage, post nasal    Sinus Pressure/Pain  x  mild   Loss of smell   x    Dental pain  x  Jaw pain   Sore Throat  x     Swollen Glands   x    Ear Pain/Fullness   x    Cough  x  nonproductive   Wheeze   x    Chest Pain   x    Shortness of breath   x    Rash   x    Other         Symptom duration:  5 days ago, worsening yesterday    Symptom severity:  moderate   Treatments tried:  cough drops, ibuprofen   Contacts:  son had cold-URI/sinus infection, works in school       Problem list and histories reviewed & adjusted, as indicated.  Additional history: as documented    Patient Active Problem List   Diagnosis     Cervical intraepithelial neoplasia grade 2     Exercise-induced asthma     Recurrent cold sores     Internal hemorrhoid     Recurrent genital HSV (herpes simplex virus) infection     mirena IUD     Past Surgical History:   Procedure Laterality Date     LEEP TX, CERVICAL  9/5/14    BERENICE 1-3, Margins negative     MOUTH SURGERY      wisdom teeth     SURGICAL HISTORY OF -  Left     repair of tear of left hip area       Social History   Substance Use Topics     Smoking status: Former Smoker     Types: Cigarettes     Smokeless tobacco: Never Used     Alcohol use 0.0 oz/week     0 Standard drinks or equivalent per week      Comment: occ     Family History   Problem Relation Age of Onset     Hypertension Father      Asthma Father      Substance Abuse Maternal Grandmother      alcohol     Other Cancer Maternal Grandmother      skin and lung     Hypertension Maternal Grandfather      CEREBROVASCULAR DISEASE Paternal Grandmother      Bleeding Disorder Paternal Grandfather            Reviewed and updated as  "needed this visit by clinical staff       Reviewed and updated as needed this visit by Provider         ROS:  Constitutional, HEENT, cardiovascular, pulmonary, gi and gu systems are negative, except as otherwise noted.    OBJECTIVE:     /68 (BP Location: Right arm, Patient Position: Sitting, Cuff Size: Adult Large)  Pulse 108  Temp 98.4  F (36.9  C) (Tympanic)  Resp 16  Ht 5' 5\" (1.651 m)  Wt 176 lb 6.4 oz (80 kg)  SpO2 98%  BMI 29.35 kg/m2  Body mass index is 29.35 kg/(m^2).  GENERAL: healthy, alert and no distress  EYES: Eyes grossly normal to inspection, PERRL and conjunctivae and sclerae normal  HENT: normal cephalic/atraumatic, ear canals and TM's normal, nose and mouth without ulcers or lesions, nasal mucosa edematous , oropharynx clear, oral mucous membranes moist and sinuses: not tender  NECK: no adenopathy, no asymmetry, masses, or scars and thyroid normal to palpation  RESP: lungs clear to auscultation - no rales, rhonchi or wheezes  CV: regular rates and rhythm, normal S1 S2, no S3 or S4 and no murmur, click or rub  SKIN: no suspicious lesions or rashes  NEURO: Normal strength and tone, mentation intact and speech normal    Diagnostic Test Results:  Results for orders placed or performed in visit on 03/26/18 (from the past 24 hour(s))   Rapid strep screen   Result Value Ref Range    Rapid Strep A Screen Negative neg       ASSESSMENT/PLAN:       ICD-10-CM    1. Viral URI J06.9 fluticasone (FLONASE) 50 MCG/ACT spray    B97.89    2. Throat pain R07.0 Rapid strep screen     Beta strep group A culture       FUTURE APPOINTMENTS:       - Follow up in 1 week for persistent symptoms, sooner for new or worsening symptoms.     Patient Instructions   Thank you for using the [place] [address] [facility].  If there is no improvement of your condition, please call and schedule an appointment with your primary care provider.      The medication (s), dosing, route and duration was discussed with the " patient.  In addition the drug monograph was reviewed and given to the patient for the medication (s).    Viral Upper Respiratory Illness (Adult)  You have a viral upper respiratory illness (URI), which is another term for the common cold. This illness is contagious during the first few days. It is spread through the air by coughing and sneezing. It may also be spread by direct contact (touching the sick person and then touching your own eyes, nose, or mouth). Frequent handwashing will decrease risk of spread. Most viral illnesses go away within 7 to 10 days with rest and simple home remedies. Sometimes the illness may last for several weeks. Antibiotics will not kill a virus, and they are generally not prescribed for this condition.    Home care    If symptoms are severe, rest at home for the first 2 to 3 days. When you resume activity, don't let yourself get too tired.    Avoid being exposed to cigarette smoke (yours or others ).    You may use acetaminophen or ibuprofen to control pain and fever, unless another medicine was prescribed. (Note: If you have chronic liver or kidney disease, have ever had a stomach ulcer or gastrointestinal bleeding, or are taking blood-thinning medicines, talk with your healthcare provider before using these medicines.) Aspirin should never be given to anyone under 18 years of age who is ill with a viral infection or fever. It may cause severe liver or brain damage.    Your appetite may be poor, so a light diet is fine. Avoid dehydration by drinking 6 to 8 glasses of fluids per day (water, soft drinks, juices, tea, or soup). Extra fluids will help loosen secretions in the nose and lungs.    Over-the-counter cold medicines will not shorten the length of time you re sick, but they may be helpful for the following symptoms: cough, sore throat, and nasal and sinus congestion. (Note: Do not use decongestants if you have high blood pressure.)  Follow-up care  Follow up with your healthcare  provider, or as advised.  When to seek medical advice  Call your healthcare provider right away if any of these occur:    Cough with lots of colored sputum (mucus)    Severe headache; face, neck, or ear pain    Difficulty swallowing due to throat pain    Fever of 100.4 F (38 C)  Call 911, or get immediate medical care  Call emergency services right away if any of these occur:    Chest pain, shortness of breath, wheezing, or difficulty breathing    Coughing up blood    Inability to swallow due to throat pain  Date Last Reviewed: 9/13/2015 2000-2017 [employer]. [address] [place], [place] [id number]. All rights reserved. This information is not intended as a substitute for professional medical care. Always follow your healthcare professional's instructions.          IVET Bliss [facility] PROVIDER  [facility]

## 2018-03-26 NOTE — MR AVS SNAPSHOT
After Visit Summary   3/26/2018    Gisele Meadows    MRN: [id number]           Patient Information     Date Of Birth          1985        Visit Information        Provider Department      3/26/2018 10:30 AM Provider, [facility] 831        Today's Diagnoses     Viral URI    -  1    Throat pain          Care Instructions    Thank you for using the [facility] Clinic.  If there is no improvement of your condition, please call and schedule an appointment with your primary care provider.      The medication (s), dosing, route and duration was discussed with the patient.  In addition the drug monograph was reviewed and given to the patient for the medication (s).    Viral Upper Respiratory Illness (Adult)  You have a viral upper respiratory illness (URI), which is another term for the common cold. This illness is contagious during the first few days. It is spread through the air by coughing and sneezing. It may also be spread by direct contact (touching the sick person and then touching your own eyes, nose, or mouth). Frequent handwashing will decrease risk of spread. Most viral illnesses go away within 7 to 10 days with rest and simple home remedies. Sometimes the illness may last for several weeks. Antibiotics will not kill a virus, and they are generally not prescribed for this condition.    Home care    If symptoms are severe, rest at home for the first 2 to 3 days. When you resume activity, don't let yourself get too tired.    Avoid being exposed to cigarette smoke (yours or others ).    You may use acetaminophen or ibuprofen to control pain and fever, unless another medicine was prescribed. (Note: If you have chronic liver or kidney disease, have ever had a stomach ulcer or gastrointestinal bleeding, or are taking blood-thinning medicines, talk with your healthcare provider before using these medicines.) Aspirin should never be given to anyone under  18 years of age who is ill with a viral infection or fever. It may cause severe liver or brain damage.    Your appetite may be poor, so a light diet is fine. Avoid dehydration by drinking 6 to 8 glasses of fluids per day (water, soft drinks, juices, tea, or soup). Extra fluids will help loosen secretions in the nose and lungs.    Over-the-counter cold medicines will not shorten the length of time you re sick, but they may be helpful for the following symptoms: cough, sore throat, and nasal and sinus congestion. (Note: Do not use decongestants if you have high blood pressure.)  Follow-up care  Follow up with your healthcare provider, or as advised.  When to seek medical advice  Call your healthcare provider right away if any of these occur:    Cough with lots of colored sputum (mucus)    Severe headache; face, neck, or ear pain    Difficulty swallowing due to throat pain    Fever of 100.4 F (38 C)  Call 911, or get immediate medical care  Call emergency services right away if any of these occur:    Chest pain, shortness of breath, wheezing, or difficulty breathing    Coughing up blood    Inability to swallow due to throat pain  Date Last Reviewed: 9/13/2015 2000-2017 [employer]. [address] [place] [address]. All rights reserved. This information is not intended as a substitute for professional medical care. Always follow your healthcare professional's instructions.                Follow-ups after your visit        Who to contact     If you have questions or need follow up information about today's clinic visit or your schedule please contact [place] [address] directly at [phone].  Normal or non-critical lab and imaging results will be communicated to you by MyChart, letter or phone within 4 business days after the clinic has received the results. If you do not hear from us within 7 days, please contact the clinic through MyChart or phone. If you have a critical  "or abnormal lab result, we will notify you by phone as soon as possible.  Submit refill requests through [employer] or call your pharmacy and they will forward the refill request to us. Please allow 3 business days for your refill to be completed.          Additional Information About Your Visit        [employer] Information     [employer] gives you secure access to your electronic health record. If you see a primary care provider, you can also send messages to your care team and make appointments. If you have questions, please call your primary care clinic.  If you do not have a primary care provider, please call [phone] and they will assist you.        Care EveryWhere ID     This is your Care EveryWhere ID. This could be used by other organizations to access your [place] medical records  [id number]        Your Vitals Were     Pulse Temperature Respirations Height Pulse Oximetry BMI (Body Mass Index)    108 98.4  F (36.9  C) (Tympanic) 16 5' 5\" (1.651 m) 98% 29.35 kg/m2       Blood Pressure from Last 3 Encounters:   03/26/18 116/68   07/20/17 112/69   06/14/17 131/82    Weight from Last 3 Encounters:   03/26/18 176 lb 6.4 oz (80 kg)   07/20/17 178 lb 9.6 oz (81 kg)   06/14/17 186 lb 12.8 oz (84.7 kg)              We Performed the Following     Beta strep group A culture     Rapid strep screen          Today's Medication Changes          These changes are accurate as of 3/26/18 10:44 AM.  If you have any questions, ask your nurse or doctor.               Start taking these medicines.        Dose/Directions    fluticasone 50 MCG/ACT spray   Commonly known as:  FLONASE   Used for:  Viral URI   Started by:  Provider, [employer]        Dose:  1 spray   Spray 1 spray into both nostrils 2 times daily   Quantity:  1 Bottle   Refills:  0            Where to get your medicines      Some of these will need a paper prescription and others can be bought over the counter.  Ask your nurse if you have questions.     Bring a paper " prescription for each of these medications     fluticasone 50 MCG/ACT spray                Primary Care Provider Office Phone # Fax #    [facility] [phone] [address] [facility] [phone]        Equal Access to Services     CLIFF LAMA : Hadii shaq cade hadburkeo Sooneliaali, waaxda luqadaha, qaybta kaalmada adeegyada, waxkip marisolin hayshailan aderodrigo moisesshree guthrie. So [facility] [phone].    ATENCIÓN: Si habla español, tiene a saavedra disposición servicios gratuitos de asistencia lingüística. Adrienneame al [phone].    We comply with applicable federal civil rights laws and [place] laws. We do not discriminate on the basis of race, color, national origin, age, disability, sex, sexual orientation, or gender identity.            Thank you!     Thank you for choosing [place] [address]  for your care. Our goal is always to provide you with excellent care. Hearing back from our patients is one way we can continue to improve our services. Please take a few minutes to complete the written survey that you may receive in the mail after your visit with us. Thank you!             Your Updated Medication List - Protect others around you: Learn how to safely use, store and throw away your medicines at www.disposemymeds.org.          This list is accurate as of 3/26/18 10:44 AM.  Always use your most recent med list.                   Brand Name Dispense Instructions for use Diagnosis    ferrous gluconate 324 (38 FE) MG tablet    FERGON    100 tablet    Take 1 tablet (324 mg) by mouth 3 times daily (with meals)    Hemorrhage of rectum and anus, Prenatal care, first pregnancy, second trimester, Uterine size date discrepancy, second trimester       fluticasone 50 MCG/ACT spray    FLONASE    1 Bottle    Spray 1 spray into both nostrils 2 times daily    Viral URI       levonorgestrel 20 MCG/24HR IUD    MIRENA     1 each (20 mcg) by Intrauterine route continuous    Encounter for  insertion of mirena IUD       prenatal multivitamin plus iron 27-0.8 MG Tabs per tablet      Take 1 tablet by mouth daily        valACYclovir 1000 mg tablet    VALTREX    60 tablet    Take 1 tablet (1,000 mg) by mouth daily    Prenatal care, first pregnancy, third trimester, Genital herpes affecting pregnancy in third trimester

## 2018-03-28 LAB
BACTERIA SPEC CULT: NORMAL
SPECIMEN SOURCE: NORMAL

## 2018-06-08 NOTE — NURSING NOTE
6/8/2018  Pharmacy letter regarding FDA recall of Fluticasone lot# [id number]  Was mailed to patient.  URBAN Acosta ([facility])

## 2018-11-19 DIAGNOSIS — Z34.03 PRENATAL CARE, FIRST PREGNANCY, THIRD TRIMESTER: ICD-10-CM

## 2018-11-19 DIAGNOSIS — A60.09 GENITAL HERPES AFFECTING PREGNANCY IN THIRD TRIMESTER: ICD-10-CM

## 2018-11-19 DIAGNOSIS — O98.313 GENITAL HERPES AFFECTING PREGNANCY IN THIRD TRIMESTER: ICD-10-CM

## 2018-11-19 NOTE — TELEPHONE ENCOUNTER
"Requested Prescriptions   Pending Prescriptions Disp Refills     valACYclovir (VALTREX) 1000 mg tablet 60 tablet 3    Last Written Prescription Date:  4/5/17  Last Fill Quantity: 60,  # refills: 3   Last office visit: 3/26/2018 with prescribing provider:  Dawit   Future Office Visit:     Sig: Take 1 tablet (1,000 mg) by mouth daily    Antivirals for Herpes Protocol Failed    11/19/2018  5:55 PM       Failed - Recent (12 mo) or future (30 days) visit within the authorizing provider's specialty    Patient had office visit in the last 12 months or has a visit in the next 30 days with authorizing provider or within the authorizing provider's specialty.  See \"Patient Info\" tab in inbasket, or \"Choose Columns\" in Meds & Orders section of the refill encounter.             Failed - Normal serum creatinine on file in past 12 months    Recent Labs   Lab Test  06/14/17   0739   CR  0.79            Passed - Patient is age 12 or older          "

## 2018-11-20 RX ORDER — VALACYCLOVIR HYDROCHLORIDE 1 G/1
1000 TABLET, FILM COATED ORAL DAILY
Qty: 60 TABLET | Refills: 3 | Status: ON HOLD | OUTPATIENT
Start: 2018-11-20 | End: 2020-06-01

## 2018-11-20 NOTE — TELEPHONE ENCOUNTER
Routing refill request to provider for review/approval because:  Ordered by OB/GYN    Joanna BOONE RN

## 2019-06-27 ENCOUNTER — OFFICE VISIT (OUTPATIENT)
Dept: OBGYN | Facility: CLINIC | Age: 34
End: 2019-06-27
Payer: COMMERCIAL

## 2019-06-27 ENCOUNTER — RESULT FOLLOW UP (OUTPATIENT)
Dept: OBGYN | Facility: CLINIC | Age: 34
End: 2019-06-27

## 2019-06-27 VITALS
SYSTOLIC BLOOD PRESSURE: 123 MMHG | HEIGHT: 65 IN | DIASTOLIC BLOOD PRESSURE: 88 MMHG | RESPIRATION RATE: 16 BRPM | BODY MASS INDEX: 29.42 KG/M2 | HEART RATE: 69 BPM | TEMPERATURE: 98.2 F | WEIGHT: 176.6 LBS

## 2019-06-27 DIAGNOSIS — D06.9 CIN III WITH SEVERE DYSPLASIA: ICD-10-CM

## 2019-06-27 DIAGNOSIS — Z31.69 PRE-CONCEPTION COUNSELING: ICD-10-CM

## 2019-06-27 DIAGNOSIS — Z30.432 ENCOUNTER FOR REMOVAL OF INTRAUTERINE CONTRACEPTIVE DEVICE: Primary | ICD-10-CM

## 2019-06-27 DIAGNOSIS — Z12.4 SCREENING FOR CERVICAL CANCER: ICD-10-CM

## 2019-06-27 PROBLEM — Z30.430 ENCOUNTER FOR INSERTION OF MIRENA IUD: Status: RESOLVED | Noted: 2017-07-20 | Resolved: 2019-06-27

## 2019-06-27 PROCEDURE — 58301 REMOVE INTRAUTERINE DEVICE: CPT | Performed by: OBSTETRICS & GYNECOLOGY

## 2019-06-27 PROCEDURE — 99212 OFFICE O/P EST SF 10 MIN: CPT | Mod: 25 | Performed by: OBSTETRICS & GYNECOLOGY

## 2019-06-27 PROCEDURE — 87624 HPV HI-RISK TYP POOLED RSLT: CPT | Performed by: OBSTETRICS & GYNECOLOGY

## 2019-06-27 PROCEDURE — G0145 SCR C/V CYTO,THINLAYER,RESCR: HCPCS | Performed by: OBSTETRICS & GYNECOLOGY

## 2019-06-27 ASSESSMENT — MIFFLIN-ST. JEOR: SCORE: 1501.93

## 2019-06-27 NOTE — NURSING NOTE
"Initial /88 (BP Location: Left arm, Patient Position: Sitting, Cuff Size: Adult Regular)   Pulse 69   Temp 98.2  F (36.8  C) (Tympanic)   Resp 16   Ht 1.651 m (5' 5\")   Wt 80.1 kg (176 lb 9.6 oz)   Breastfeeding? No   BMI 29.39 kg/m   Estimated body mass index is 29.39 kg/m  as calculated from the following:    Height as of this encounter: 1.651 m (5' 5\").    Weight as of this encounter: 80.1 kg (176 lb 9.6 oz). .    Jackie Rodriguez, JESSE    "

## 2019-06-27 NOTE — PROGRESS NOTES
"  SUBJECTIVE:                                                   CC:  Patient presents with:  Minor Procedure: IUD removal      HPI:  Gisele Meadows is a 34 year old  who presents for removal of IUD.  Thinking aout getting pregnant again.  Has been on MVI, will switch it out for PNV. No issues with IUD. Son is 2 years old, busy but an easy toddler otherwise.     ROS: 10 point ROS negative other than as listed above in HPI.    Gyn History:  No LMP recorded.        Last 3 Pap and HPV Results:  NILM 2016      PMH, PSH, Soc Hx, Fam Hx, Meds, and allergies reviewed in Epic.    OBJECTIVE:     /88 (BP Location: Left arm, Patient Position: Sitting, Cuff Size: Adult Regular)   Pulse 69   Temp 98.2  F (36.8  C) (Tympanic)   Resp 16   Ht 1.651 m (5' 5\")   Wt 80.1 kg (176 lb 9.6 oz)   Breastfeeding? No   BMI 29.39 kg/m      Gen: Healthy appearing female, no acute distress, comfortable  HENT: No scleral injection or icterus  CV: Regular rate  Resp: Normal work of breathing, no cough  GI: Abdomen soft, non-tender. No masses, organomegaly  Skin: No suspicious lesions or rashes  Psychiatric: mentation appears normal and affect bright  : Normal external female genitalia.  No external lesions, normal hair distribution.   SSE: Speculum exam reveals vaginal epithelium well rugated with normal physiologic discharge. Cervix appears smooth, pink, with no visible lesions.  IUD strings visible 1cm from the os.  Pap smear obtained.     IUD Removal Procedure Note  2019     The patient was counseled on the risks, benefits, and alternatives of the procedure. Verbal and written consent were obtained.    The patient was placed in the dorsal lithotomy position.  A speculum was inserted without difficulty. The IUD strings were visualized and grasped with a ring forcep and the IUD was removed in its entirety. The patient tolerated the procedure without cramping or complications.     Char Burrell, " MD      ASSESSMENT/PLAN:                                                      1. Encounter for removal of intrauterine contraceptive device  S/p removal as above  - REMOVE INTRAUTERINE DEVICE    2. Screening for cervical cancer  Pap smear done today, was due 8/2019  - Pap imaged thin layer screen with HPV - recommended age 30 - 65 years (select HPV order below)  - HPV High Risk Types DNA Cervical    3. Pre-conception counseling  Discussed PNV, spacing of pregnancies, reviewed medications.  Discussed optimization of weight, blood pressure, and stress reduction.  Discussed calling clinic with a positive pregnancy test and presenting for care at 8 weeks of pregnancy for ultrasound and labs.  Discussed presenting for care if trying to conceive for >1 year.         Char Burrell MD, MPH  Obstetrics and Gynecology

## 2019-07-02 LAB
COPATH REPORT: NORMAL
PAP: NORMAL

## 2019-07-05 LAB
FINAL DIAGNOSIS: ABNORMAL
HPV HR 12 DNA CVX QL NAA+PROBE: POSITIVE
HPV16 DNA SPEC QL NAA+PROBE: NEGATIVE
HPV18 DNA SPEC QL NAA+PROBE: NEGATIVE
SPECIMEN DESCRIPTION: ABNORMAL
SPECIMEN SOURCE CVX/VAG CYTO: ABNORMAL

## 2019-07-08 NOTE — PROGRESS NOTES
2005 NIL Pap  2006 ASCUS Pap, Neg HPV.  2007 NIL Pap  2008 NIL Pap  6/16/09 ASCUS Pap, + HR HPV  7/6/09 [place] Bx & ECC - Negative  8/3/10 LSIL Pap  10/3/11 ASCUS Pap  10/23/12 LSIL Pap, + HR HPV.  11/7/12 [place] Bx - BERENICE 1, ECC - Negative  12/30/13 LSIL Pap, can't exclude HSIL.   1/17/2014 [place]: BERENICE II, ECC BERENICE II  9/5/2014: LEEP - BERENICE 1, 2, 3. Negative margins. ([place])  10/22/15 NIL Pap, + HR HPV  8/9/16 [place] - Negative. NIL Pap, Neg HPV.  All above from Care Everywhere  6/27/19 NIL Pap, + HR HPV (Neg 16/18). Plan colp. (MRA)  07/15/19: Pt was advised. (sk)  7/31/19 [place] ECC - Negative. Plan cotest in 1 year.   8/6/19 Pt notified. (christine)

## 2019-07-30 ENCOUNTER — OFFICE VISIT (OUTPATIENT)
Dept: OBGYN | Facility: CLINIC | Age: 34
End: 2019-07-30
Payer: COMMERCIAL

## 2019-07-30 VITALS
SYSTOLIC BLOOD PRESSURE: 142 MMHG | WEIGHT: 179 LBS | TEMPERATURE: 97 F | HEART RATE: 75 BPM | DIASTOLIC BLOOD PRESSURE: 92 MMHG | BODY MASS INDEX: 29.79 KG/M2

## 2019-07-30 DIAGNOSIS — R87.810 CERVICAL HIGH RISK HPV (HUMAN PAPILLOMAVIRUS) TEST POSITIVE: Primary | ICD-10-CM

## 2019-07-30 LAB — HCG UR QL: NEGATIVE

## 2019-07-30 PROCEDURE — 57456 ENDOCERV CURETTAGE W/SCOPE: CPT | Performed by: OBSTETRICS & GYNECOLOGY

## 2019-07-30 PROCEDURE — 81025 URINE PREGNANCY TEST: CPT | Performed by: OBSTETRICS & GYNECOLOGY

## 2019-07-30 NOTE — PROGRESS NOTES
Gisele Meadows is a 34 year old female P1 (SVDx1)  who presents for repeat colposcopy.      Pap smear hx is as follows:   6/27/19: NIL, HPV positive (not type 16/18)    BP (!) 142/92 (BP Location: Left arm, Patient Position: Chair, Cuff Size: Adult Regular)   Pulse 75   Temp 97  F (36.1  C) (Tympanic)   Wt 81.2 kg (179 lb)   LMP 07/09/2019 (Exact Date)   Breastfeeding? No   BMI 29.79 kg/m    Patient's last menstrual period was 07/09/2019 (exact date).   UPT today is negative      Patient does smoke    Type of contraception: none  Age at first sexual intercourse: 17  Number of sexual partners (lifetime): ~8  Past GYN history:  Questionable for genital herpes although has never had an outbreak tested for it  Prior cervical/vaginal disease: Severe dysplasia.  Prior cervical treatment: LEEP.      PROCEDURE:  Before the procedure, it was ensured that the patient was educated regarding the nature of her findings to date, the implications, and what was to be done. She has been made aware of the role of HPV, the natural history of infection, ways to minimize her future risk, the effect of HPV on the cervix, and treatment options available should they be indicated.  The details of the colposcopic procedure were reviewed.  All questions were answered before proceeding, and informed consent was therefore obtained.    Speculum placed in vagina and excellent visualization of cervix   acheived, cervix swabbed x 3 with acetic acid solution.    FINDINGS:  Cervix: no visible lesions  Please refer to images section for details.    Pap repeated?:  No  SCJ seen?:  yes    ECC done?:  Yes   Lugol's solution used?:  Yes   Satisfactory examination?:  yes      ASSESSMENT: Cervical high risk HPV positive.    PLAN: specimens labelled and sent to Pathology, will base further treatment on Pathology findings, post biopsy instructions given to patient, call to discuss Pathology results and  Smoking cessation discussed    Gerardo Recinos  MD Jon  [facility]

## 2019-07-30 NOTE — NURSING NOTE
"Initial BP (!) 142/92 (BP Location: Left arm, Patient Position: Chair, Cuff Size: Adult Regular)   Pulse 75   Temp 97  F (36.1  C) (Tympanic)   Wt 81.2 kg (179 lb)   LMP 07/09/2019 (Exact Date)   Breastfeeding? No   BMI 29.79 kg/m   Estimated body mass index is 29.79 kg/m  as calculated from the following:    Height as of 6/27/19: 1.651 m (5' 5\").    Weight as of this encounter: 81.2 kg (179 lb). .    Irena Cueto    "

## 2019-07-31 PROCEDURE — 88305 TISSUE EXAM BY PATHOLOGIST: CPT | Performed by: OBSTETRICS & GYNECOLOGY

## 2019-08-04 LAB — COPATH REPORT: NORMAL

## 2019-08-06 NOTE — RESULT ENCOUNTER NOTE
Inform patient that her colposcopy biopsy returned benign.   She is to return for cotesting Pap smear in 1 year.   Thank you.     Gerardo Webb MD  [facility]

## 2019-09-25 ENCOUNTER — APPOINTMENT (OUTPATIENT)
Dept: OBGYN | Facility: CLINIC | Age: 34
End: 2019-09-25
Payer: COMMERCIAL

## 2019-09-25 ENCOUNTER — PRENATAL OFFICE VISIT (OUTPATIENT)
Dept: OBGYN | Facility: CLINIC | Age: 34
End: 2019-09-25

## 2019-09-25 DIAGNOSIS — Z34.80 PRENATAL CARE, SUBSEQUENT PREGNANCY: ICD-10-CM

## 2019-09-25 PROCEDURE — 99207 ZZC NO CHARGE NURSE ONLY: CPT | Performed by: OBSTETRICS & GYNECOLOGY

## 2019-10-28 ENCOUNTER — PRENATAL OFFICE VISIT (OUTPATIENT)
Dept: OBGYN | Facility: CLINIC | Age: 34
End: 2019-10-28
Payer: COMMERCIAL

## 2019-10-28 VITALS
DIASTOLIC BLOOD PRESSURE: 86 MMHG | TEMPERATURE: 98.9 F | SYSTOLIC BLOOD PRESSURE: 134 MMHG | BODY MASS INDEX: 29.95 KG/M2 | HEART RATE: 98 BPM | WEIGHT: 180 LBS

## 2019-10-28 DIAGNOSIS — Z34.81 PRENATAL CARE, SUBSEQUENT PREGNANCY IN FIRST TRIMESTER: Primary | ICD-10-CM

## 2019-10-28 LAB
ABO + RH BLD: NORMAL
ABO + RH BLD: NORMAL
ALBUMIN UR-MCNC: NEGATIVE MG/DL
APPEARANCE UR: CLEAR
BILIRUB UR QL STRIP: NEGATIVE
BLD GP AB SCN SERPL QL: NORMAL
BLOOD BANK CMNT PATIENT-IMP: NORMAL
COLOR UR AUTO: YELLOW
ERYTHROCYTE [DISTWIDTH] IN BLOOD BY AUTOMATED COUNT: 11.8 % (ref 10–15)
GLUCOSE UR STRIP-MCNC: NEGATIVE MG/DL
HCT VFR BLD AUTO: 35 % (ref 35–47)
HGB BLD-MCNC: 12 G/DL (ref 11.7–15.7)
HGB UR QL STRIP: NEGATIVE
KETONES UR STRIP-MCNC: NEGATIVE MG/DL
LEUKOCYTE ESTERASE UR QL STRIP: NEGATIVE
MCH RBC QN AUTO: 33.1 PG (ref 26.5–33)
MCHC RBC AUTO-ENTMCNC: 34.3 G/DL (ref 31.5–36.5)
MCV RBC AUTO: 97 FL (ref 78–100)
NITRATE UR QL: NEGATIVE
PH UR STRIP: 6 PH (ref 5–7)
PLATELET # BLD AUTO: 418 10E9/L (ref 150–450)
RBC # BLD AUTO: 3.62 10E12/L (ref 3.8–5.2)
SOURCE: NORMAL
SP GR UR STRIP: <=1.005 (ref 1–1.03)
SPECIMEN EXP DATE BLD: NORMAL
SPECIMEN SOURCE: ABNORMAL
UROBILINOGEN UR STRIP-ACNC: 0.2 EU/DL (ref 0.2–1)
WBC # BLD AUTO: 16.6 10E9/L (ref 4–11)
WET PREP SPEC: ABNORMAL

## 2019-10-28 PROCEDURE — 87210 SMEAR WET MOUNT SALINE/INK: CPT | Performed by: OBSTETRICS & GYNECOLOGY

## 2019-10-28 PROCEDURE — 87389 HIV-1 AG W/HIV-1&-2 AB AG IA: CPT | Performed by: OBSTETRICS & GYNECOLOGY

## 2019-10-28 PROCEDURE — 86780 TREPONEMA PALLIDUM: CPT | Performed by: OBSTETRICS & GYNECOLOGY

## 2019-10-28 PROCEDURE — 99207 ZZC FIRST OB VISIT: CPT | Performed by: OBSTETRICS & GYNECOLOGY

## 2019-10-28 PROCEDURE — 87591 N.GONORRHOEAE DNA AMP PROB: CPT | Performed by: OBSTETRICS & GYNECOLOGY

## 2019-10-28 PROCEDURE — 87491 CHLMYD TRACH DNA AMP PROBE: CPT | Performed by: OBSTETRICS & GYNECOLOGY

## 2019-10-28 PROCEDURE — 87340 HEPATITIS B SURFACE AG IA: CPT | Performed by: OBSTETRICS & GYNECOLOGY

## 2019-10-28 PROCEDURE — 76817 TRANSVAGINAL US OBSTETRIC: CPT | Performed by: OBSTETRICS & GYNECOLOGY

## 2019-10-28 PROCEDURE — 81003 URINALYSIS AUTO W/O SCOPE: CPT | Performed by: OBSTETRICS & GYNECOLOGY

## 2019-10-28 PROCEDURE — 86762 RUBELLA ANTIBODY: CPT | Performed by: OBSTETRICS & GYNECOLOGY

## 2019-10-28 PROCEDURE — 86901 BLOOD TYPING SEROLOGIC RH(D): CPT | Performed by: OBSTETRICS & GYNECOLOGY

## 2019-10-28 PROCEDURE — 87086 URINE CULTURE/COLONY COUNT: CPT | Performed by: OBSTETRICS & GYNECOLOGY

## 2019-10-28 PROCEDURE — 86900 BLOOD TYPING SEROLOGIC ABO: CPT | Performed by: OBSTETRICS & GYNECOLOGY

## 2019-10-28 PROCEDURE — 86850 RBC ANTIBODY SCREEN: CPT | Performed by: OBSTETRICS & GYNECOLOGY

## 2019-10-28 PROCEDURE — 36415 COLL VENOUS BLD VENIPUNCTURE: CPT | Performed by: OBSTETRICS & GYNECOLOGY

## 2019-10-28 PROCEDURE — 85027 COMPLETE CBC AUTOMATED: CPT | Performed by: OBSTETRICS & GYNECOLOGY

## 2019-10-28 NOTE — PROGRESS NOTES
Gisele is a 34 year old  @ 8w1d Patient's last menstrual period was 2019.  Here for new ob visit.      ROS: Ten point review of systems was reviewed and negative except the above.    Has no complaints.     OBhx:  (vacuum delivery; used for Category 2 tracing during her second stage of labor per patient's recollection of the event)   Gyne: Denies hx of STIs. History of cervical dysplasia. Recent colposcopy negative as of 2019      Past Medical History:   Diagnosis Date     Abnormal Pap smear of cervix , , , , ,     see problem list     Cervical high risk HPV (human papillomavirus) test positive , , ,     see problem list     Chickenpox      BERENICE III with severe dysplasia     LEEP     Genital herpes      Hypertension     noted 2 days after delivery in 2017 lasting 2-3 days     Seizure (H)     x2 at age 20     Past Surgical History:   Procedure Laterality Date     LEEP TX, CERVICAL  14    BERENICE 1-3, Margins negative     MOUTH SURGERY      wisdom teeth     SURGICAL HISTORY OF -  Left     repair of cartialage in left hip     Patient Active Problem List    Diagnosis Date Noted     Prenatal care, subsequent pregnancy 2019     Priority: Medium     Recurrent genital HSV (herpes simplex virus) infection 2017     Priority: Medium     Valtrex 1 gram daily, outbreak at about 30 weeks       Internal hemorrhoid 2017     Priority: Medium     Recurrent cold sores 2016     Priority: Medium     Exercise-induced asthma 2007     Priority: Medium     Overview:   Exercised induced          Allergies   Allergen Reactions     Sulfa Drugs Hives     Prenatal Vit-Fe Fumarate-FA (PRENATAL MULTIVITAMIN  PLUS IRON) 27-0.8 MG TABS, Take 1 tablet by mouth daily  valACYclovir (VALTREX) 1000 mg tablet, Take 1 tablet (1,000 mg) by mouth daily    No current facility-administered medications on file prior to visit.       Past Medical History of Father of Baby:    No significant medical history    Physical Exam: /86 (BP Location: Left arm, Patient Position: Chair, Cuff Size: Adult Regular)   Pulse 98   Temp 98.9  F (37.2  C) (Tympanic)   Wt 81.6 kg (180 lb)   LMP 2019   Breastfeeding? No   BMI 29.95 kg/m    General: Well developed, well nourished female  Skin: No lesions, Warm, moist and No cyanosis or pallor  HEENT: Atraumatic, normocephalic  Neck: Supple,no adenopathy,thyroid normal  Chest: Clear to auscultation  Heart: Regular rate, rhythm  Breasts: deferred   Abdomen: Soft, flat, non-tender   Extremities: No cyanosis, clubbing, warm and well perfused and No edema  Neurological: Mental Status Normal and Station and Gait Normal   Perineum: Normal   Vulva: Normal genitalia and Bartholin's, Urethra, [place]'s normal  Vagina: Normal mucosa, no discharge  Cervix: Parous, closed, mobile, no discharge  Uterus: 8 weeks, Normal shape, position and consistency   Adnexa: No masses, nodularity, tenderness  Rectum: deferred.   Bony Pelvis: Adequate     Transvaginal ultrasound was performed.  A single live intrauterine pregnancy was seen.  CRL = 1.86 cm consistent with 8 weeks, 2 days.  Fetal heart motion was visualized at 173 bpm              EDC by LMP: 2020   EDC by sono:  2020  Final EDC: 2020    A/P 34 year old  at      1. Discussed physician coverage, tertiary support, diet, exercise, weight gain, schedule of visits, routine and indicated ultrasounds, and childbirth education.    2. Options for  testing for chromosomal and birth defects were discussed with the patient including nuchal lucency/blood marker testing in the first trimester and quad screening and/or Level 2 ultrasound in the second trimester.  We discussed that these are screening tests and not diagnostic tests and that false positives and negatives are a distinct possibility.  We discussed that follow up diagnostic testing would include chorionic villus sampling or  amniocentesis depending on gestational age. Couple will think about this.     3. History of genital herpes  -- reviewed suppressive therapy starting 36w gestation     4. Prenatal labs, GC, Chlamydia    5. Prenatal Vitamins encouraged.     6. Given suboptimal dating imaging acquired, ordered formal dating ultrasound which is scheduled for tomorrow.     7. RTC in 4 weeks. SAB precautions reviewed    Gerardo Webb MD  [facility]

## 2019-10-28 NOTE — NURSING NOTE
"Initial /86 (BP Location: Left arm, Patient Position: Chair, Cuff Size: Adult Regular)   Pulse 98   Temp 98.9  F (37.2  C) (Tympanic)   Wt 81.6 kg (180 lb)   LMP 09/01/2019   Breastfeeding? No   BMI 29.95 kg/m   Estimated body mass index is 29.95 kg/m  as calculated from the following:    Height as of 6/27/19: 1.651 m (5' 5\").    Weight as of this encounter: 81.6 kg (180 lb). .    Ierna Cueto MA    "

## 2019-10-29 ENCOUNTER — HOSPITAL ENCOUNTER (OUTPATIENT)
Dept: ULTRASOUND IMAGING | Facility: CLINIC | Age: 34
Discharge: HOME | End: 2019-10-29
Attending: OBSTETRICS & GYNECOLOGY | Admitting: OBSTETRICS & GYNECOLOGY
Payer: COMMERCIAL

## 2019-10-29 DIAGNOSIS — Z34.81 PRENATAL CARE, SUBSEQUENT PREGNANCY IN FIRST TRIMESTER: ICD-10-CM

## 2019-10-29 DIAGNOSIS — N76.0 BACTERIAL VAGINITIS: Primary | ICD-10-CM

## 2019-10-29 DIAGNOSIS — B96.89 BACTERIAL VAGINITIS: Primary | ICD-10-CM

## 2019-10-29 LAB
BACTERIA SPEC CULT: NORMAL
C TRACH DNA SPEC QL NAA+PROBE: NEGATIVE
HBV SURFACE AG SERPL QL IA: NONREACTIVE
HIV 1+2 AB+HIV1 P24 AG SERPL QL IA: NONREACTIVE
Lab: NORMAL
N GONORRHOEA DNA SPEC QL NAA+PROBE: NEGATIVE
RUBV IGG SERPL IA-ACNC: 15 IU/ML
SPECIMEN SOURCE: NORMAL
T PALLIDUM AB SER QL: NONREACTIVE

## 2019-10-29 PROCEDURE — 76801 OB US < 14 WKS SINGLE FETUS: CPT

## 2019-10-29 RX ORDER — METRONIDAZOLE 500 MG/1
500 TABLET ORAL 2 TIMES DAILY
Qty: 14 TABLET | Refills: 0 | Status: SHIPPED | OUTPATIENT
Start: 2019-10-29 | End: 2019-11-13

## 2019-10-30 ENCOUNTER — NURSE TRIAGE (OUTPATIENT)
Dept: NURSING | Facility: CLINIC | Age: 34
End: 2019-10-30

## 2019-10-30 NOTE — TELEPHONE ENCOUNTER
Caller concerned that  Flagyl safe in pregnancy    Review of EMR reveals;    Result MeraryNotGerardo Dumont MD (Physician)     OB/Gyn      Inform patient that she has bacterial vaginitis.   She will be prescribed medication to help treat this that is safe in pregnancy to take.      Gerardo Webb MD  [facility]           Read to patient verbatim and patient is reassured   Bushra NIEVES    Reason for Disposition    [1] Follow-up call to recent contact AND [2] information only call, no triage required    Protocols used: INFORMATION ONLY CALL-A-

## 2019-11-03 ENCOUNTER — HEALTH MAINTENANCE LETTER (OUTPATIENT)
Age: 34
End: 2019-11-03

## 2019-11-13 ENCOUNTER — OFFICE VISIT (OUTPATIENT)
Dept: LAB | Facility: SCHOOL | Age: 34
End: 2019-11-13
Payer: COMMERCIAL

## 2019-11-13 VITALS
SYSTOLIC BLOOD PRESSURE: 120 MMHG | BODY MASS INDEX: 28.25 KG/M2 | WEIGHT: 180 LBS | RESPIRATION RATE: 15 BRPM | HEIGHT: 67 IN | DIASTOLIC BLOOD PRESSURE: 70 MMHG | OXYGEN SATURATION: 98 % | HEART RATE: 104 BPM | TEMPERATURE: 99 F

## 2019-11-13 DIAGNOSIS — H65.01 NON-RECURRENT ACUTE SEROUS OTITIS MEDIA OF RIGHT EAR: Primary | ICD-10-CM

## 2019-11-13 PROCEDURE — 99213 OFFICE O/P EST LOW 20 MIN: CPT

## 2019-11-13 ASSESSMENT — MIFFLIN-ST. JEOR: SCORE: 1549.1

## 2019-11-13 NOTE — PROGRESS NOTES
Gisele Meadows is a 34 year old female who presents to clinic today for the following health issues:    HPI   Ear Pain       Duration: 2 days     Description (location/character/radiation): right ear     Intensity:  moderate    Accompanying signs and symptoms: None    History (similar episodes/previous evaluation): None    Precipitating or alleviating factors: None    Therapies tried and outcome: tylenol has helped a little     Patient is 10 weeks pregnant     Patient Active Problem List   Diagnosis     Exercise-induced asthma     Recurrent cold sores     Internal hemorrhoid     Recurrent genital HSV (herpes simplex virus) infection     Prenatal care, subsequent pregnancy     Cervical high risk HPV (human papillomavirus) test positive     BERENICE II (cervical intraepithelial neoplasia II)     H/O colposcopy with cervical biopsy     Past Surgical History:   Procedure Laterality Date     LEEP TX, CERVICAL  14    BERENICE 1-3, Margins negative     MOUTH SURGERY      wisdom teeth     SURGICAL HISTORY OF -  Left     repair of cartialage in left hip       Social History     Tobacco Use     Smoking status: Former Smoker     Packs/day: 0.10     Types: Cigarettes     Last attempt to quit: 2019     Years since quittin.4     Smokeless tobacco: Never Used   Substance Use Topics     Alcohol use: Yes     Alcohol/week: 0.0 standard drinks     Comment: occas-quit with pregnancy     Family History   Problem Relation Age of Onset     Hypertension Father      Asthma Father      Substance Abuse Maternal Grandmother         alcohol     Other Cancer Maternal Grandmother         skin and lung     Hypertension Maternal Grandfather      Cerebrovascular Disease Paternal Grandmother      Bleeding Disorder Paternal Grandfather          Current Outpatient Medications   Medication Sig Dispense Refill     Prenatal Vit-Fe Fumarate-FA (PRENATAL MULTIVITAMIN  PLUS IRON) 27-0.8 MG TABS Take 1 tablet by mouth daily       valACYclovir (VALTREX) 1000  "mg tablet Take 1 tablet (1,000 mg) by mouth daily 60 tablet 3     Allergies   Allergen Reactions     Sulfa Drugs Hives     Reviewed and updated as needed this visit by Provider  Tobacco  Allergies  Meds  Problems  Med Hx  Surg Hx  Fam Hx         Review of Systems   ROS COMP: CONSTITUTIONAL: NEGATIVE for fever, chills, change in weight  ENT/MOUTH: POSITIVE for ear pain right  RESP: NEGATIVE for significant cough or SOB  CV: NEGATIVE for chest pain, palpitations or peripheral edema  PSYCHIATRIC: NEGATIVE for changes in mood or affect  ROS otherwise negative      Objective    /70   Pulse 104   Temp 99  F (37.2  C) (Tympanic)   Resp 15   Ht 1.702 m (5' 7\")   Wt 81.6 kg (180 lb)   LMP 09/01/2019   SpO2 98%   BMI 28.19 kg/m    Body mass index is 28.19 kg/m .  Physical Exam   GENERAL: healthy, alert and no distress  HENT: normal cephalic/atraumatic, right ear: clear effusion, nose and mouth without ulcers or lesions, oropharynx clear and oral mucous membranes moist  RESP: lungs clear to auscultation - no rales, rhonchi or wheezes  CV: regular rate and rhythm, normal S1 S2, no S3 or S4, no murmur, click or rub, no peripheral edema and peripheral pulses strong  PSYCH: mentation appears normal, affect normal/bright    Diagnostic Test Results:  none         Assessment & Plan     1. Non-recurrent acute serous otitis media of right ear  Patient does not have an infection of the ear but does have clear effusion behind the eardrum.  She has hx of allergies but these have not bothered her much this year.  She is 10 weeks pregnant and I discussed risks with use of Flonase.  Recommend tylenol and benadryl as needed for symptoms.  Follow-up in clinic if any persistent or worsening symptoms.     See Patient Instructions    Return in about 1 week (around 11/20/2019), or if symptoms worsen or fail to improve.    Nadia Galvez NP on 11/13/2019 at 9:39 AM  [facility]       "

## 2019-11-13 NOTE — PATIENT INSTRUCTIONS
1.  Use tylenol and benedryl as needed for symptom management of fluid in the ear canal.  2.  Follow-up in clinic if any worsening symptoms.

## 2019-11-25 ENCOUNTER — PRENATAL OFFICE VISIT (OUTPATIENT)
Dept: OBGYN | Facility: CLINIC | Age: 34
End: 2019-11-25
Payer: COMMERCIAL

## 2019-11-25 VITALS
RESPIRATION RATE: 16 BRPM | WEIGHT: 181.7 LBS | DIASTOLIC BLOOD PRESSURE: 70 MMHG | TEMPERATURE: 98.1 F | BODY MASS INDEX: 28.52 KG/M2 | SYSTOLIC BLOOD PRESSURE: 119 MMHG | HEIGHT: 67 IN | HEART RATE: 80 BPM

## 2019-11-25 DIAGNOSIS — Z34.81 PRENATAL CARE, SUBSEQUENT PREGNANCY IN FIRST TRIMESTER: Primary | ICD-10-CM

## 2019-11-25 PROCEDURE — 99207 ZZC PRENATAL VISIT: CPT | Performed by: OBSTETRICS & GYNECOLOGY

## 2019-11-25 ASSESSMENT — MIFFLIN-ST. JEOR: SCORE: 1556.82

## 2019-11-25 NOTE — NURSING NOTE
"Initial /70 (BP Location: Right arm, Patient Position: Sitting, Cuff Size: Adult Large)   Pulse 80   Temp 98.1  F (36.7  C) (Tympanic)   Resp 16   Ht 1.702 m (5' 7\")   Wt 82.4 kg (181 lb 11.2 oz)   LMP 09/01/2019   Breastfeeding No   BMI 28.46 kg/m   Estimated body mass index is 28.46 kg/m  as calculated from the following:    Height as of this encounter: 1.702 m (5' 7\").    Weight as of this encounter: 82.4 kg (181 lb 11.2 oz). .    Jackie Rodriguez, JESSE    "

## 2019-11-25 NOTE — PROGRESS NOTES
She has a cold with head congestion and fatigue; otherwise no complaints.  She declined first trimester screen.  We discussed quad screen briefly, will decide next visit.  May take tylenol and sudafed for cold symptoms.  RTC 4 weeks.  LT

## 2019-12-27 ENCOUNTER — PRENATAL OFFICE VISIT (OUTPATIENT)
Dept: OBGYN | Facility: CLINIC | Age: 34
End: 2019-12-27
Payer: COMMERCIAL

## 2019-12-27 VITALS
SYSTOLIC BLOOD PRESSURE: 119 MMHG | HEIGHT: 67 IN | TEMPERATURE: 97.6 F | RESPIRATION RATE: 16 BRPM | WEIGHT: 183.4 LBS | BODY MASS INDEX: 28.79 KG/M2 | DIASTOLIC BLOOD PRESSURE: 67 MMHG | HEART RATE: 89 BPM

## 2019-12-27 DIAGNOSIS — Z34.81 PRENATAL CARE, SUBSEQUENT PREGNANCY IN FIRST TRIMESTER: Primary | ICD-10-CM

## 2019-12-27 DIAGNOSIS — Z34.82 PRENATAL CARE, SUBSEQUENT PREGNANCY IN SECOND TRIMESTER: ICD-10-CM

## 2019-12-27 PROCEDURE — 99207 ZZC PRENATAL VISIT: CPT | Performed by: OBSTETRICS & GYNECOLOGY

## 2019-12-27 ASSESSMENT — MIFFLIN-ST. JEOR: SCORE: 1564.53

## 2019-12-27 NOTE — PROGRESS NOTES
"Return OB    Gisele is a 34 year old  @ 16w5d Patient's last menstrual period was 2019.  Here for return ob visit. No VB, LOF. Rare FM. Having a lot of pain at the insertion site of her adductor muscle.      Physical Exam:   /67 (BP Location: Right arm, Patient Position: Chair, Cuff Size: Adult Regular)   Pulse 89   Temp 97.6  F (36.4  C) (Tympanic)   Resp 16   Ht 1.702 m (5' 7\")   Wt 83.2 kg (183 lb 6.4 oz)   LMP 2019   Breastfeeding No   BMI 28.72 kg/m       General: Well developed, well nourished female  Skin: No lesions, Warm, moist and No cyanosis or pallor  Chest: breathing comfortably on room air with no appreciable cough or wheeze  Heart: Regular rate, warm and well-perfused  Abdomen: Soft, flat, non-tender     See OB flowsheet     A/P 34 year old  at  16w5d by LMP c/w 8w2d US, return OB     1. Labs nl.      2. Declines genetics. Anatomy scan ordered.      3. History of genital herpes  -- reviewed suppressive therapy starting 36w gestation      4. Adductor muscle pain - no acute injury, likely pregnancy related, discussed supportive measures, PT if worsens    Return in 4 wks     Rosette Harrington MD  [facility]          "

## 2019-12-27 NOTE — NURSING NOTE
"Initial /67 (BP Location: Right arm, Patient Position: Chair, Cuff Size: Adult Regular)   Pulse 89   Temp 97.6  F (36.4  C) (Tympanic)   Resp 16   Ht 1.702 m (5' 7\")   Wt 83.2 kg (183 lb 6.4 oz)   LMP 09/01/2019   Breastfeeding No   BMI 28.72 kg/m   Estimated body mass index is 28.72 kg/m  as calculated from the following:    Height as of this encounter: 1.702 m (5' 7\").    Weight as of this encounter: 83.2 kg (183 lb 6.4 oz). .    Gisele Greene, JESSE    "

## 2020-01-09 ENCOUNTER — TELEPHONE (OUTPATIENT)
Dept: OBGYN | Facility: CLINIC | Age: 35
End: 2020-01-09

## 2020-01-09 NOTE — LETTER
[facility]  [address] [place] [address]  [phone]    2020      RE:Gisele Meadows                                                                                                                               [id number] [facility] [id number]            To Whom It May Concern:      Gisele Meadows ( 1985) is a patient here at [facility].  She is currently pregnant with an estimated delivery date of 20.      Sincerely,      Francie Sadler MD

## 2020-01-09 NOTE — TELEPHONE ENCOUNTER
Reason for Call:  Other call back    Detailed comments: Pt calling for letter with estimated delivery date - she will  at next appt.  Letter typed and given to Francie Sadler MD to sign.    Phone Number Patient can be reached at: Home number on file [phone] (home)    Best Time:     Can we leave a detailed message on this number? Not Applicable    Call taken on 1/9/2020 at 10:29 AM by Yakelin Mathew

## 2020-01-21 ENCOUNTER — HOSPITAL ENCOUNTER (OUTPATIENT)
Dept: ULTRASOUND IMAGING | Facility: CLINIC | Age: 35
Discharge: HOME | End: 2020-01-21
Attending: OBSTETRICS & GYNECOLOGY | Admitting: OBSTETRICS & GYNECOLOGY
Payer: COMMERCIAL

## 2020-01-21 DIAGNOSIS — Z34.82 PRENATAL CARE, SUBSEQUENT PREGNANCY IN SECOND TRIMESTER: ICD-10-CM

## 2020-01-21 PROCEDURE — 76805 OB US >/= 14 WKS SNGL FETUS: CPT

## 2020-01-24 ENCOUNTER — PRENATAL OFFICE VISIT (OUTPATIENT)
Dept: OBGYN | Facility: CLINIC | Age: 35
End: 2020-01-24
Payer: COMMERCIAL

## 2020-01-24 VITALS
WEIGHT: 189 LBS | SYSTOLIC BLOOD PRESSURE: 107 MMHG | DIASTOLIC BLOOD PRESSURE: 72 MMHG | BODY MASS INDEX: 29.6 KG/M2 | HEART RATE: 83 BPM | TEMPERATURE: 97.7 F

## 2020-01-24 DIAGNOSIS — Z34.82 PRENATAL CARE, SUBSEQUENT PREGNANCY IN SECOND TRIMESTER: Primary | ICD-10-CM

## 2020-01-24 PROCEDURE — 99207 ZZC PRENATAL VISIT: CPT | Performed by: OBSTETRICS & GYNECOLOGY

## 2020-01-24 RX ORDER — GUAR GUM
PACKET (EA) ORAL
COMMUNITY
End: 2020-04-30

## 2020-01-24 RX ORDER — SENNA AND DOCUSATE SODIUM 50; 8.6 MG/1; MG/1
1-2 TABLET, FILM COATED ORAL 2 TIMES DAILY PRN
Qty: 60 TABLET | Refills: 1 | Status: SHIPPED | OUTPATIENT
Start: 2020-01-24 | End: 2020-03-30

## 2020-01-24 NOTE — NURSING NOTE
"Initial /72 (BP Location: Right arm, Patient Position: Chair, Cuff Size: Adult Large)   Pulse 83   Temp 97.7  F (36.5  C) (Tympanic)   Wt 85.7 kg (189 lb)   LMP 09/01/2019   Breastfeeding No   BMI 29.60 kg/m   Estimated body mass index is 29.6 kg/m  as calculated from the following:    Height as of 12/27/19: 1.702 m (5' 7\").    Weight as of this encounter: 85.7 kg (189 lb). .    Irena Cueto MA    "

## 2020-01-24 NOTE — PROGRESS NOTES
Return OB     Gisele is a 34 year old  @ 20w5d here for return ob visit. No VB, LOF. +FM. Hip pain still present.      Physical Exam:   /72 (BP Location: Right arm, Patient Position: Chair, Cuff Size: Adult Large)   Pulse 83   Temp 97.7  F (36.5  C) (Tympanic)   Wt 85.7 kg (189 lb)   LMP 2019   Breastfeeding No   BMI 29.60 kg/m       General: Well developed, well nourished female  Skin: No lesions, Warm, moist and No cyanosis or pallor  Chest: breathing comfortably on room air with no appreciable cough or wheeze  Heart: Regular rate, warm and well-perfused  Abdomen: Soft, gravid, non-tender      FH: at U  Doptones: 140s     A/P 34 year old  at 20w5d by LMP c/w 8w2d US, return OB     1. Labs nl. 3rd tri labs ord'd      2. Declines genetics. Anatomy scan WNL-- headache slightly out of proportion, will order repeat 24-28w     3. History of genital herpes  -- reviewed suppressive therapy starting 36w gestation      4. Adductor muscle pain - no acute injury, likely pregnancy related, discussed supportive measures, PT ordered    5. Constipation- Senna S ord'd    Return in 4 wks     Francie Sadler MD   2020 3:02 PM

## 2020-02-12 ENCOUNTER — THERAPY VISIT (OUTPATIENT)
Dept: PHYSICAL THERAPY | Facility: CLINIC | Age: 35
End: 2020-02-12
Attending: OBSTETRICS & GYNECOLOGY
Payer: COMMERCIAL

## 2020-02-12 DIAGNOSIS — Z34.82 PRENATAL CARE, SUBSEQUENT PREGNANCY IN SECOND TRIMESTER: ICD-10-CM

## 2020-02-12 DIAGNOSIS — M25.551 HIP PAIN, RIGHT: ICD-10-CM

## 2020-02-12 PROCEDURE — 97110 THERAPEUTIC EXERCISES: CPT | Mod: GP | Performed by: PHYSICAL THERAPIST

## 2020-02-12 PROCEDURE — 97530 THERAPEUTIC ACTIVITIES: CPT | Mod: GP | Performed by: PHYSICAL THERAPIST

## 2020-02-12 PROCEDURE — 97161 PT EVAL LOW COMPLEX 20 MIN: CPT | Mod: GP | Performed by: PHYSICAL THERAPIST

## 2020-02-12 NOTE — PROGRESS NOTES
[facility] Initial Evaluation  Subjective:  The history is provided by the patient. No  was used.   Patient Entered Health History - See Therapist Evaluation below  Gisele Meadows being seen for right hip pain.     Problem began: 12/15/2019.   Problem occurred: pregnancy related  and reported as 2/10 on pain scale.  General health as reported by patient is good.  Pertinent medical history includes: anemia, asthma, changes in bowel/bladder, currently pregnant and seizures.     Medical allergies: other. Other medical allergies details: sulfa.   Surgeries include:  Orthopedic surgery and other (L hip surgery-labrum). Other surgery history details: wisdom teeth extraction.        Current occupation is education.                     Therapist Generated HPI Evaluation  Problem details: Date of MD order for this episode was 20. Gisele states her pain started about 12-15-19. 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 She states she just got out of bed and was not able to walk. She had sharp pain in the R adductor(high). It was always at worst during the night, though would bother her during the day as well. The pain has improved to not bothering her at night, but she does feel it during the day. She is 23 weeks pregnant with a due date of 20.  She is , vaginal.   Mild stress incontinence  Exercise- walking at work, can be extensive/bothersome, also has 2 1/1 yo  Did buy 2 maternity supports, will bring next visit.  PMH L hip surgery for labrum tear.         Affected Side: R high adductor.    This is a new condition.  Condition occurred with:  Other reason (pregnancy).  Where condition occurred: for unknown reasons.  Site of Pain: R high adductor.  Pain is described as sharp and is intermittent.  Pain radiates to:  No radiation. Pain is worse in the A.M. (or at end of day).  Since onset symptoms are gradually improving.  Associated symptoms:   Loss of motion/stiffness. Symptoms are exacerbated by ascending stairs, descending stairs, other and activity (getting out of bed-rolling, prolonged walking)  and relieved by other (tylenol).      Restrictions due to condition include:  Working in normal job without restrictions.  Barriers include:  None as reported by patient.    Physical Exam                    Objective:    Gait:    Gait Type:  Normal   Assistive Devices:  None      Flexibility/Screens:       Lower Extremity:  Decreased left lower extremity flexibility:Quadriceps    Decreased right lower extremity flexibility:  Quadriceps               Lumbar/SI Evaluation  ROM:  AROM Lumbar: normal    Strength: positve diastasis recti 3 fingersbreadth at umbilicus                    SI joint/Sacrum:    Sacral base level, P4 wnl                                            Hip Evaluation  Hip PROM:  Hip PROM:  Left Hip:    Normal  Right Hip:  Normal                          Hip Strength:    Flexion:   Left: 5/5   Pain:  Right: 5/5   Pain:                      Abduction:  Left: 4/5     Pain:Right: 4/5    Pain:    Internal Rotation:  Left: 5/5    Pain:Right: 5/5   Pain:  External Rotation:  Left: 5/5   Pain:  Right: 5/5   Pain:  Knee Flexion:  Left: 5/5   Pain:Right: 5/5   Pain:  Knee Extension:  Left: 5/5   Pain:Right: 5/5    Pain:        Hip Special Testing:      Left hip negative for the following special tests:  Piriformis; Jose; Fadir/Labrum or SLR  Right hip negative for the following special tests:  Piriformis; Jose; Fadir/Labrum or SLR    Hip Palpation:    Left hip tenderness present at:   Pubis  Left hip tenderness not present at:  Ischial Tuberosity; Greater Trachanter; hip flexors; Piriformis; PSIS; ASIS or Adductors  Right hip tenderness present at:  Adductors and Pubis  Right hip tenderness not present at:  Ischial Tuberosity; Greater Trachanter; hip flexors; Piriformis; PSIS or ASIS             General     ROS    Assessment/Plan:    Patient is a 34  year old female with sacral, pelvic and right side hip complaints.    Patient has the following significant findings with corresponding treatment plan.                Diagnosis 1:  R hip pain(most likely due to SIJ laxity  Pain -  manual therapy, self management, education and home program  Decreased ROM/flexibility - manual therapy, therapeutic exercise and home program  Decreased strength - therapeutic exercise, therapeutic activities and home program  Impaired muscle performance - neuro re-education and home program  Decreased function - therapeutic activities and home program    Therapy Evaluation Codes:   1) History comprised of:   Personal factors that impact the plan of care:      Time since onset of symptoms.    Comorbidity factors that impact the plan of care are:      prior L hip surgery, positive diastasis recti.     Medications impacting care: None.  2) Examination of Body Systems comprised of:   Body structures and functions that impact the plan of care:      Hip, Pelvis, Sacral illiac joint and diastasis.   Activity limitations that impact the plan of care are:      Squatting/kneeling, Stairs, Standing, Walking, Working, Sleeping and Laying down.  3) Clinical presentation characteristics are:   Stable/Uncomplicated.  4) Decision-Making    Low complexity using standardized patient assessment instrument and/or measureable assessment of functional outcome.  Cumulative Therapy Evaluation is: Low complexity.    Previous and current functional limitations:  (See Goal Flow Sheet for this information)    Short term and Long term goals: (See Goal Flow Sheet for this information)     Communication ability:  Patient appears to be able to clearly communicate and understand verbal and written communication and follow directions correctly.  Treatment Explanation - The following has been discussed with the patient:   RX ordered/plan of care  Anticipated outcomes  Possible risks and side effects  This patient would  benefit from PT intervention to resume normal activities.   Rehab potential is good.    Frequency:  2 X a month, once daily  Duration:  for 2-3 months to progress core program, if pain worsens will see more frequently  Discharge Plan:  Achieve all LTG.  Independent in home treatment program.  Reach maximal therapeutic benefit.    Please refer to the daily flowsheet for treatment today, total treatment time and time spent performing 1:1 timed codes.

## 2020-02-18 ENCOUNTER — PRENATAL OFFICE VISIT (OUTPATIENT)
Dept: OBGYN | Facility: CLINIC | Age: 35
End: 2020-02-18
Payer: COMMERCIAL

## 2020-02-18 VITALS
SYSTOLIC BLOOD PRESSURE: 104 MMHG | WEIGHT: 193 LBS | TEMPERATURE: 97.6 F | RESPIRATION RATE: 18 BRPM | HEIGHT: 67 IN | HEART RATE: 86 BPM | DIASTOLIC BLOOD PRESSURE: 74 MMHG | BODY MASS INDEX: 30.29 KG/M2

## 2020-02-18 DIAGNOSIS — Z34.82 PRENATAL CARE, SUBSEQUENT PREGNANCY IN SECOND TRIMESTER: ICD-10-CM

## 2020-02-18 DIAGNOSIS — Z34.82 PRENATAL CARE, SUBSEQUENT PREGNANCY IN SECOND TRIMESTER: Primary | ICD-10-CM

## 2020-02-18 LAB
ERYTHROCYTE [DISTWIDTH] IN BLOOD BY AUTOMATED COUNT: 12.7 % (ref 10–15)
GLUCOSE 1H P 50 G GLC PO SERPL-MCNC: 155 MG/DL (ref 60–129)
HCT VFR BLD AUTO: 32.6 % (ref 35–47)
HGB BLD-MCNC: 11.1 G/DL (ref 11.7–15.7)
MCH RBC QN AUTO: 32.3 PG (ref 26.5–33)
MCHC RBC AUTO-ENTMCNC: 34 G/DL (ref 31.5–36.5)
MCV RBC AUTO: 95 FL (ref 78–100)
PLATELET # BLD AUTO: 423 10E9/L (ref 150–450)
RBC # BLD AUTO: 3.44 10E12/L (ref 3.8–5.2)
WBC # BLD AUTO: 17.9 10E9/L (ref 4–11)

## 2020-02-18 PROCEDURE — 82950 GLUCOSE TEST: CPT | Performed by: OBSTETRICS & GYNECOLOGY

## 2020-02-18 PROCEDURE — 86780 TREPONEMA PALLIDUM: CPT | Performed by: OBSTETRICS & GYNECOLOGY

## 2020-02-18 PROCEDURE — 99207 ZZC PRENATAL VISIT: CPT | Performed by: OBSTETRICS & GYNECOLOGY

## 2020-02-18 PROCEDURE — 36415 COLL VENOUS BLD VENIPUNCTURE: CPT | Performed by: OBSTETRICS & GYNECOLOGY

## 2020-02-18 PROCEDURE — 85027 COMPLETE CBC AUTOMATED: CPT | Performed by: OBSTETRICS & GYNECOLOGY

## 2020-02-18 ASSESSMENT — MIFFLIN-ST. JEOR: SCORE: 1608.07

## 2020-02-18 NOTE — PROGRESS NOTES
"Return OB     Gisele is a 34 year old  @ 24w2d a here for return ob visit. No VB, LOF. +FM.      Physical Exam:   /74 (BP Location: Right arm, Patient Position: Chair, Cuff Size: Adult Large)   Pulse 86   Temp 97.6  F (36.4  C) (Tympanic)   Resp 18   Ht 1.702 m (5' 7\")   Wt 87.5 kg (193 lb)   LMP 2019   BMI 30.23 kg/m        General: Well developed, well nourished female  Skin: No lesions, Warm, moist and No cyanosis or pallor  Chest: breathing comfortably on room air with no appreciable cough or wheeze  Heart: Regular rate, warm and well-perfused  Abdomen: Soft, gravid, non-tender      FH: 24  Doptones: 150s     A/P 34 year old  at 24w2d by LMP c/w 8w2d US, return OB     1. Labs nl. 3rd tri labs pending today      2. Declines genetics. Anatomy scan WNL-- head lightly out of proportion, repeat ordered     3. History of genital herpes  -- reviewed suppressive therapy starting 36w gestation      4. Adductor muscle pain - no acute injury, likely pregnancy related, discussed supportive measures, PT ordered     5. Constipation- Senna S ord'd     Return in 4 wks      Francie Sadler MD   2020 9:55 AM   "

## 2020-02-19 LAB — T PALLIDUM AB SER QL: NONREACTIVE

## 2020-02-21 ENCOUNTER — TELEPHONE (OUTPATIENT)
Dept: OBGYN | Facility: CLINIC | Age: 35
End: 2020-02-21

## 2020-02-21 DIAGNOSIS — Z34.82 PRENATAL CARE, SUBSEQUENT PREGNANCY IN SECOND TRIMESTER: ICD-10-CM

## 2020-02-21 LAB
GLUCOSE 1H P 100 G GLC PO SERPL-MCNC: 137 MG/DL (ref 60–179)
GLUCOSE 2H P 100 G GLC PO SERPL-MCNC: 123 MG/DL (ref 60–154)
GLUCOSE 3H P 100 G GLC PO SERPL-MCNC: 43 MG/DL (ref 60–139)
GLUCOSE P FAST SERPL-MCNC: 85 MG/DL (ref 60–94)

## 2020-02-21 PROCEDURE — 36415 COLL VENOUS BLD VENIPUNCTURE: CPT | Performed by: OBSTETRICS & GYNECOLOGY

## 2020-02-21 PROCEDURE — 82952 GTT-ADDED SAMPLES: CPT | Performed by: OBSTETRICS & GYNECOLOGY

## 2020-02-21 PROCEDURE — 82951 GLUCOSE TOLERANCE TEST (GTT): CPT | Performed by: OBSTETRICS & GYNECOLOGY

## 2020-02-21 NOTE — TELEPHONE ENCOUNTER
"Lab called to state that final 3 hr GTT reading was a critical value of 43. Dr. Destiny Cates on call and advised patient eat some food if she hasn't already.     Spoke to patient and advised of results. I asked if she has eaten yet since completing test? She stated: \"I ate a Peanut butter sandwich and had a Sprite since I was feeling acacia clammy and shaky and a little bit dizzy when I left there, but I feel fine now..\"     Summer Nascimento RN    "

## 2020-02-26 ENCOUNTER — HOSPITAL ENCOUNTER (OUTPATIENT)
Dept: ULTRASOUND IMAGING | Facility: CLINIC | Age: 35
Discharge: HOME | End: 2020-02-26
Attending: OBSTETRICS & GYNECOLOGY | Admitting: OBSTETRICS & GYNECOLOGY
Payer: COMMERCIAL

## 2020-02-26 DIAGNOSIS — Z34.82 PRENATAL CARE, SUBSEQUENT PREGNANCY IN SECOND TRIMESTER: ICD-10-CM

## 2020-02-26 PROCEDURE — 76816 OB US FOLLOW-UP PER FETUS: CPT

## 2020-02-27 ENCOUNTER — THERAPY VISIT (OUTPATIENT)
Dept: PHYSICAL THERAPY | Facility: CLINIC | Age: 35
End: 2020-02-27
Payer: COMMERCIAL

## 2020-02-27 DIAGNOSIS — M25.551 HIP PAIN, RIGHT: ICD-10-CM

## 2020-02-27 PROCEDURE — 97110 THERAPEUTIC EXERCISES: CPT | Mod: GP | Performed by: PHYSICAL THERAPIST

## 2020-02-27 PROCEDURE — 97112 NEUROMUSCULAR REEDUCATION: CPT | Mod: GP | Performed by: PHYSICAL THERAPIST

## 2020-03-03 ENCOUNTER — TELEPHONE (OUTPATIENT)
Dept: OBGYN | Facility: CLINIC | Age: 35
End: 2020-03-03

## 2020-03-03 ENCOUNTER — HOSPITAL ENCOUNTER (OUTPATIENT)
Facility: CLINIC | Age: 35
Discharge: HOME | End: 2020-03-03
Attending: OBSTETRICS & GYNECOLOGY | Admitting: OBSTETRICS & GYNECOLOGY
Payer: OTHER MISCELLANEOUS

## 2020-03-03 ENCOUNTER — HOSPITAL ENCOUNTER (OUTPATIENT)
Facility: CLINIC | Age: 35
End: 2020-03-03
Admitting: OBSTETRICS & GYNECOLOGY
Payer: COMMERCIAL

## 2020-03-03 VITALS — DIASTOLIC BLOOD PRESSURE: 78 MMHG | SYSTOLIC BLOOD PRESSURE: 125 MMHG | TEMPERATURE: 98.4 F

## 2020-03-03 PROBLEM — Z34.80 SUPERVISION OF OTHER NORMAL PREGNANCY: Status: ACTIVE | Noted: 2020-03-03

## 2020-03-03 LAB
APTT PPP: 28 SEC (ref 22–37)
ERYTHROCYTE [DISTWIDTH] IN BLOOD BY AUTOMATED COUNT: 13 % (ref 10–15)
FIBRINOGEN PPP-MCNC: 721 MG/DL (ref 200–420)
HCT VFR BLD AUTO: 33.6 % (ref 35–47)
HGB BLD-MCNC: 11.1 G/DL (ref 11.7–15.7)
INR PPP: 1.02 (ref 0.86–1.14)
MCH RBC QN AUTO: 31.4 PG (ref 26.5–33)
MCHC RBC AUTO-ENTMCNC: 33 G/DL (ref 31.5–36.5)
MCV RBC AUTO: 95 FL (ref 78–100)
PLATELET # BLD AUTO: 404 10E9/L (ref 150–450)
RBC # BLD AUTO: 3.53 10E12/L (ref 3.8–5.2)
WBC # BLD AUTO: 14.3 10E9/L (ref 4–11)

## 2020-03-03 PROCEDURE — 85384 FIBRINOGEN ACTIVITY: CPT | Performed by: OBSTETRICS & GYNECOLOGY

## 2020-03-03 PROCEDURE — 85730 THROMBOPLASTIN TIME PARTIAL: CPT | Performed by: OBSTETRICS & GYNECOLOGY

## 2020-03-03 PROCEDURE — 85610 PROTHROMBIN TIME: CPT | Performed by: OBSTETRICS & GYNECOLOGY

## 2020-03-03 PROCEDURE — G0463 HOSPITAL OUTPT CLINIC VISIT: HCPCS

## 2020-03-03 PROCEDURE — 85027 COMPLETE CBC AUTOMATED: CPT | Performed by: OBSTETRICS & GYNECOLOGY

## 2020-03-03 PROCEDURE — 36415 COLL VENOUS BLD VENIPUNCTURE: CPT | Performed by: OBSTETRICS & GYNECOLOGY

## 2020-03-03 NOTE — TELEPHONE ENCOUNTER
S-(situation): Pt reports she fell on the ice landing on her abdomen.  She denies having any contractions, no leaking of fluid or vaginal bleeding.    B-(background): , 26wk 2d    A-(assessment): Abdominal injury as result of falling on the ice.    R-(recommendations): [facility] and she advised that pt will need to be seen for monitoring.  Pt advised and will transport herself to hospital.    Lorna Gilbert  [facility] RN

## 2020-03-03 NOTE — DISCHARGE INSTRUCTIONS
Please call the triage line at [phone] if you develop abdominal pain, cramping, loss of fluid vaginally, vaginal bleeding or have any other concerns.  Keep your next scheduled appointment.

## 2020-03-03 NOTE — PROGRESS NOTES
Patient denies abdominal pain, cramps or contractions.  Denies loss of fluid or vaginal bleeding.  Discharge papers given with number to call of concerns or questions.  Discharged ambulatory.

## 2020-03-03 NOTE — PROGRESS NOTES
Patient fell on the ice at her place of employment at 0830 this morning.  She was able to cushion her fall with her hands and knees but states her abdomen did receive impact.  Upon arrival, patient is anxious but states she is feeling fetal movement.  Placed on fetal monitor with a Tier 1 tracing.  Discussed that we will monitor her for 4 hours post fall and that Dr. Cates has ordered lab testing as well. There is no signs of injury to her abdomen (bruising, abrasions, etc.)  Patient agrees to notify me if she develops abdominal pain or cramping.  Dr. Cates reviewed lab results.

## 2020-03-03 NOTE — TELEPHONE ENCOUNTER
Reason for call:  Patient reporting a symptom    Symptom or request: Pt states she is 26 wks pregnant and slipped on some ice just now - landed on her abdomen.    Duration (how long have symptoms been present):     Have you been treated for this before? No    Additional comments:     Phone Number patient can be reached at:  Home number on file [phone] (home)    Best Time:      Can we leave a detailed message on this number:  Not Applicable    Call taken on 3/3/2020 at 8:45 AM by Yakelin Mathew

## 2020-03-09 ENCOUNTER — THERAPY VISIT (OUTPATIENT)
Dept: PHYSICAL THERAPY | Facility: CLINIC | Age: 35
End: 2020-03-09
Payer: COMMERCIAL

## 2020-03-09 DIAGNOSIS — M25.551 HIP PAIN, RIGHT: ICD-10-CM

## 2020-03-09 PROCEDURE — 97110 THERAPEUTIC EXERCISES: CPT | Mod: GP | Performed by: PHYSICAL THERAPIST

## 2020-03-09 PROCEDURE — 97140 MANUAL THERAPY 1/> REGIONS: CPT | Mod: GP | Performed by: PHYSICAL THERAPIST

## 2020-03-09 PROCEDURE — 97112 NEUROMUSCULAR REEDUCATION: CPT | Mod: GP | Performed by: PHYSICAL THERAPIST

## 2020-03-18 ENCOUNTER — PRENATAL OFFICE VISIT (OUTPATIENT)
Dept: OBGYN | Facility: CLINIC | Age: 35
End: 2020-03-18
Payer: COMMERCIAL

## 2020-03-18 VITALS
DIASTOLIC BLOOD PRESSURE: 80 MMHG | RESPIRATION RATE: 18 BRPM | HEART RATE: 99 BPM | TEMPERATURE: 97.7 F | WEIGHT: 194 LBS | SYSTOLIC BLOOD PRESSURE: 119 MMHG | HEIGHT: 65 IN | BODY MASS INDEX: 32.32 KG/M2

## 2020-03-18 DIAGNOSIS — Z34.83 ENCOUNTER FOR SUPERVISION OF OTHER NORMAL PREGNANCY IN THIRD TRIMESTER: Primary | ICD-10-CM

## 2020-03-18 PROCEDURE — 99207 ZZC PRENATAL VISIT: CPT | Performed by: OBSTETRICS & GYNECOLOGY

## 2020-03-18 PROCEDURE — 90715 TDAP VACCINE 7 YRS/> IM: CPT | Performed by: OBSTETRICS & GYNECOLOGY

## 2020-03-18 PROCEDURE — 90471 IMMUNIZATION ADMIN: CPT | Performed by: OBSTETRICS & GYNECOLOGY

## 2020-03-18 ASSESSMENT — MIFFLIN-ST. JEOR: SCORE: 1580.86

## 2020-03-18 NOTE — NURSING NOTE
"Initial /80 (BP Location: Right arm, Patient Position: Chair, Cuff Size: Adult Large)   Pulse 99   Temp 97.7  F (36.5  C) (Tympanic)   Resp 18   Ht 1.651 m (5' 5\")   Wt 88 kg (194 lb)   LMP 09/01/2019   BMI 32.28 kg/m   Estimated body mass index is 32.28 kg/m  as calculated from the following:    Height as of this encounter: 1.651 m (5' 5\").    Weight as of this encounter: 88 kg (194 lb). .      "

## 2020-03-18 NOTE — NURSING NOTE
Prior to immunization administration, verified patients identity using patient s name and date of birth. Please see Immunization Activity for additional information.     Screening Questionnaire for Adult Immunization    Are you sick today?   No   Do you have allergies to medications, food, a vaccine component or latex?   Yes sulfa   Have you ever had a serious reaction after receiving a vaccination?   No   Do you have a long-term health problem with heart, lung, kidney, or metabolic disease (e.g., diabetes), asthma, a blood disorder, no spleen, complement component deficiency, a cochlear implant, or a spinal fluid leak?  Are you on long-term aspirin therapy?   Yes asthma   Do you have cancer, leukemia, HIV/AIDS, or any other immune system problem?   No   Do you have a parent, brother, or sister with an immune system problem?   No   In the past 3 months, have you taken medications that affect  your immune system, such as prednisone, other steroids, or anticancer drugs; drugs for the treatment of rheumatoid arthritis, Crohn s disease, or psoriasis; or have you had radiation treatments?   No   Have you had a seizure, or a brain or other nervous system problem?   No   During the past year, have you received a transfusion of blood or blood    products, or been given immune (gamma) globulin or antiviral drug?   No   For women: Are you pregnant or is there a chance you could become       pregnant during the next month?   yes   Have you received any vaccinations in the past 4 weeks?   No     Immunization questionnaire answers were all negative.        Per orders of Dr. Valenzuela, injection of adacel given by Andra Gardner CMA. Patient instructed to remain in clinic for 15 minutes afterwards, and to report any adverse reaction to me immediately.       Screening performed by Andra Gardner CMA on 3/18/2020 at 4:58 PM.

## 2020-03-18 NOTE — PROGRESS NOTES
Concerns: doing well  Doing well.  No concerns today.  No vaginal bleeding, loss of fluid, or contractions  Reportable signs and symptoms discussed.  Tdap given today  Discussed kick counts and fetal movement.  Discussed PTL, PROM, and when to call or come in.  RTC in 2 weeks.      Mehrdad Valenzuela MD

## 2020-03-23 PROBLEM — M25.551 HIP PAIN, RIGHT: Status: RESOLVED | Noted: 2020-02-12 | Resolved: 2020-03-23

## 2020-03-30 ENCOUNTER — VIRTUAL VISIT (OUTPATIENT)
Dept: OBGYN | Facility: CLINIC | Age: 35
End: 2020-03-30
Payer: COMMERCIAL

## 2020-03-30 VITALS — BODY MASS INDEX: 32.28 KG/M2 | WEIGHT: 194 LBS

## 2020-03-30 DIAGNOSIS — Z29.9 NEED FOR PROPHYLACTIC MEASURE: Primary | ICD-10-CM

## 2020-03-30 PROCEDURE — 99207 ZZC PRENATAL VISIT: CPT | Mod: TEL | Performed by: OBSTETRICS & GYNECOLOGY

## 2020-03-30 RX ORDER — VALACYCLOVIR HYDROCHLORIDE 500 MG/1
500 TABLET, FILM COATED ORAL DAILY
Qty: 30 TABLET | Refills: 0 | Status: ON HOLD | OUTPATIENT
Start: 2020-03-30 | End: 2020-06-01

## 2020-03-30 NOTE — PROGRESS NOTES
"Gisele Meadows is a 34 year old female who is being evaluated via a billable telephone visit.      The patient has been notified of following:     \"This telephone visit will be conducted via a call between you and your physician/provider. We have found that certain health care needs can be provided without the need for a physical exam.  This service lets us provide the care you need with a short phone conversation.  If a prescription is necessary we can send it directly to your pharmacy.  If lab work is needed we can place an order for that and you can then stop by our lab to have the test done at a later time.    If during the course of the call the physician/provider feels a telephone visit is not appropriate, you will not be charged for this service.\"     Physician has received verbal consent for a Telephone Visit from the patient?     Gisele Meadows complains of  No chief complaint on file.      I have reviewed and updated the patient's Past Medical History, Social History, Family History and Medication List.    ALLERGIES  Sulfa drugs          "

## 2020-03-30 NOTE — PROGRESS NOTES
Phone OB visit.  Feeling well. No ctx, VB or LOF.   Working from home. Staying home as much as possible.   Reviewed COVID restrictions and precautions.   Valtrex ppx sent.   Planning epidural, breastfeeding and Mirena IUD.    RTC in 2 weeks for in person visit.   15minutes was spent  over the phone with the patient, greater than 50% of the time was spent in counseling/coordinating of care as noted above in the A&P.    Rosette Harrington MD

## 2020-04-15 ENCOUNTER — PRENATAL OFFICE VISIT (OUTPATIENT)
Dept: OBGYN | Facility: CLINIC | Age: 35
End: 2020-04-15
Payer: COMMERCIAL

## 2020-04-15 VITALS
DIASTOLIC BLOOD PRESSURE: 79 MMHG | HEART RATE: 101 BPM | HEIGHT: 65 IN | BODY MASS INDEX: 33.32 KG/M2 | RESPIRATION RATE: 18 BRPM | WEIGHT: 200 LBS | SYSTOLIC BLOOD PRESSURE: 127 MMHG | TEMPERATURE: 98.3 F

## 2020-04-15 DIAGNOSIS — N87.1 CIN II (CERVICAL INTRAEPITHELIAL NEOPLASIA II): ICD-10-CM

## 2020-04-15 DIAGNOSIS — Z34.83 PRENATAL CARE, SUBSEQUENT PREGNANCY IN THIRD TRIMESTER: Primary | ICD-10-CM

## 2020-04-15 DIAGNOSIS — R35.0 URINE FREQUENCY: ICD-10-CM

## 2020-04-15 LAB
ALBUMIN UR-MCNC: NEGATIVE MG/DL
APPEARANCE UR: CLEAR
BILIRUB UR QL STRIP: NEGATIVE
COLOR UR AUTO: YELLOW
GLUCOSE UR STRIP-MCNC: NEGATIVE MG/DL
HGB UR QL STRIP: NEGATIVE
KETONES UR STRIP-MCNC: NEGATIVE MG/DL
LEUKOCYTE ESTERASE UR QL STRIP: NEGATIVE
NITRATE UR QL: NEGATIVE
PH UR STRIP: 5.5 PH (ref 5–7)
SOURCE: NORMAL
SP GR UR STRIP: <=1.005 (ref 1–1.03)
UROBILINOGEN UR STRIP-ACNC: 0.2 EU/DL (ref 0.2–1)

## 2020-04-15 PROCEDURE — 99207 ZZC PRENATAL VISIT: CPT | Performed by: OBSTETRICS & GYNECOLOGY

## 2020-04-15 PROCEDURE — 81003 URINALYSIS AUTO W/O SCOPE: CPT | Performed by: OBSTETRICS & GYNECOLOGY

## 2020-04-15 RX ORDER — FAMOTIDINE 20 MG/1
20 TABLET, FILM COATED ORAL 2 TIMES DAILY PRN
Qty: 90 TABLET | Refills: 1 | Status: SHIPPED | OUTPATIENT
Start: 2020-04-15 | End: 2020-10-19

## 2020-04-15 RX ORDER — DOCUSATE SODIUM 100 MG/1
100 CAPSULE, LIQUID FILLED ORAL 2 TIMES DAILY PRN
Qty: 90 CAPSULE | Refills: 1 | Status: SHIPPED | OUTPATIENT
Start: 2020-04-15 | End: 2020-10-19

## 2020-04-15 RX ORDER — FERROUS SULFATE 325(65) MG
325 TABLET ORAL
Qty: 90 TABLET | Refills: 1 | Status: SHIPPED | OUTPATIENT
Start: 2020-04-15 | End: 2020-10-19

## 2020-04-15 ASSESSMENT — MIFFLIN-ST. JEOR: SCORE: 1608.07

## 2020-04-15 NOTE — NURSING NOTE
"Initial /79 (BP Location: Right arm, Patient Position: Chair, Cuff Size: Adult Large)   Pulse 101   Temp 98.3  F (36.8  C) (Tympanic)   Resp 18   Ht 1.651 m (5' 5\")   Wt 90.7 kg (200 lb)   LMP 09/01/2019   BMI 33.28 kg/m   Estimated body mass index is 33.28 kg/m  as calculated from the following:    Height as of this encounter: 1.651 m (5' 5\").    Weight as of this encounter: 90.7 kg (200 lb). .      "

## 2020-04-15 NOTE — PROGRESS NOTES
"CC: Here for routine prenatal visit @ 32w3d   HPI: + FM, no ctx, no LOF, no VB.  GERD sx. Notes some pelvic discomfort    PE: /79 (BP Location: Right arm, Patient Position: Chair, Cuff Size: Adult Large)   Pulse 101   Temp 98.3  F (36.8  C) (Tympanic)   Resp 18   Ht 1.651 m (5' 5\")   Wt 90.7 kg (200 lb)   LMP 2019   BMI 33.28 kg/m     See OB flowsheet    UA negative    A/P  @ 32w3d normal pregnancy    1. Routine prenatal care.  COVID restrictions and recommendations reviewed including iron supplementation.  Pepcid for GERD discussed.  Reviewed aches/pains of pregnancy.      Phone visit in 2 weeks.      Destiny Cates M.D.    "

## 2020-04-30 ENCOUNTER — VIRTUAL VISIT (OUTPATIENT)
Dept: OBGYN | Facility: CLINIC | Age: 35
End: 2020-04-30
Payer: COMMERCIAL

## 2020-04-30 VITALS — TEMPERATURE: 98.5 F | BODY MASS INDEX: 33.66 KG/M2 | WEIGHT: 202 LBS | HEIGHT: 65 IN

## 2020-04-30 DIAGNOSIS — Z34.83 PRENATAL CARE, SUBSEQUENT PREGNANCY IN THIRD TRIMESTER: ICD-10-CM

## 2020-04-30 LAB
ALBUMIN SERPL-MCNC: 2.5 G/DL (ref 3.4–5)
ALP SERPL-CCNC: 148 U/L (ref 40–150)
ALT SERPL W P-5'-P-CCNC: 21 U/L (ref 0–50)
AST SERPL W P-5'-P-CCNC: 16 U/L (ref 0–45)
BILIRUB DIRECT SERPL-MCNC: <0.1 MG/DL (ref 0–0.2)
BILIRUB SERPL-MCNC: <0.1 MG/DL (ref 0.2–1.3)
PROT SERPL-MCNC: 6.9 G/DL (ref 6.8–8.8)

## 2020-04-30 PROCEDURE — 99207 ZZC PRENATAL VISIT: CPT | Performed by: OBSTETRICS & GYNECOLOGY

## 2020-04-30 PROCEDURE — 36415 COLL VENOUS BLD VENIPUNCTURE: CPT | Performed by: OBSTETRICS & GYNECOLOGY

## 2020-04-30 PROCEDURE — 80076 HEPATIC FUNCTION PANEL: CPT | Performed by: OBSTETRICS & GYNECOLOGY

## 2020-04-30 PROCEDURE — 99000 SPECIMEN HANDLING OFFICE-LAB: CPT | Performed by: OBSTETRICS & GYNECOLOGY

## 2020-04-30 PROCEDURE — 83789 MASS SPECTROMETRY QUAL/QUAN: CPT | Mod: 90 | Performed by: OBSTETRICS & GYNECOLOGY

## 2020-04-30 ASSESSMENT — MIFFLIN-ST. JEOR: SCORE: 1617.15

## 2020-04-30 NOTE — PROGRESS NOTES
"Gisele Meadows is a 34 year old female who is being evaluated via a billable telephone visit.      The patient has been notified of following:     \"This telephone visit will be conducted via a call between you and your physician/provider. We have found that certain health care needs can be provided without the need for a physical exam.  This service lets us provide the care you need with a short phone conversation.  If a prescription is necessary we can send it directly to your pharmacy.  If lab work is needed we can place an order for that and you can then stop by our lab to have the test done at a later time.    Telephone visits are billed at different rates depending on your insurance coverage. During this emergency period, for some insurers they may be billed the same as an in-person visit.  Please reach out to your insurance provider with any questions.    If during the course of the call the physician/provider feels a telephone visit is not appropriate, you will not be charged for this service.\"    Patient has given verbal consent for Telephone visit?  Yes    How would you like to obtain your AVS? Jane    Phone call duration: 10 minutes    [facility]   OB/GYN Clinic    CC: Return OB     Subjective:    Gisele is a 34 year old  at 34w4d who presents for return OB visit. She reports feeling well. Denies uterine cramping, vaginal bleeding or leaking, dysuria. +fetal movement. Has had some increased hand swelling/irritation/itching at night.    Objective: deferred, virtual visit    A/P 34 year old  at 34w4d by LMP c/w 8w2d US, return OB     1. Labs nl. 3rd tri labs with failed GCT,passed GTT.       2. Declines genetics. Anatomy scan WNL, AC on repeat 96%, serial growth US ordered today     3. History of genital herpes  -- reviewed suppressive therapy starting 36w gestation      4. Constipation- Senna S ord'd    5. Hand irritation/itching/swelling- mostly likely related to " increased fluid volume in pregnancy but will check LFTs/bile acids to confirm not cholestsasis     Return to clinic in 2 weeks    Francie Sadler MD   4/30/2020 12:27 PM

## 2020-05-01 ENCOUNTER — ANCILLARY PROCEDURE (OUTPATIENT)
Dept: ULTRASOUND IMAGING | Facility: CLINIC | Age: 35
End: 2020-05-01
Attending: OBSTETRICS & GYNECOLOGY
Payer: COMMERCIAL

## 2020-05-01 DIAGNOSIS — Z34.83 PRENATAL CARE, SUBSEQUENT PREGNANCY IN THIRD TRIMESTER: ICD-10-CM

## 2020-05-01 PROCEDURE — 76805 OB US >/= 14 WKS SNGL FETUS: CPT

## 2020-05-05 DIAGNOSIS — Z34.83 PRENATAL CARE, SUBSEQUENT PREGNANCY IN THIRD TRIMESTER: Primary | ICD-10-CM

## 2020-05-05 LAB
BILE AC SERPL-SCNC: 1.2 UMOL/L (ref 0–2.5)
CDCAE SERPL-SCNC: 2.2 UMOL/L (ref 0–3.4)
CHOLATE SERPL-SCNC: 5.5 UMOL/L (ref 0–7)
DO-CHOLATE SERPL-SCNC: 1.8 UMOL/L (ref 0–1.9)
URSODEOXYCHOLATE SERPL-SCNC: 0.3 UMOL/L (ref 0–1)

## 2020-05-05 NOTE — RESULT ENCOUNTER NOTE
Pt notified of below.  Pt reports understanding.  Pt does not have further questions or concerns.    [facility] referral generated.    Mandi Deng   Ob/Gyn Clinic  RN

## 2020-05-06 ENCOUNTER — ALLIED HEALTH/NURSE VISIT (OUTPATIENT)
Dept: OBGYN | Facility: CLINIC | Age: 35
End: 2020-05-06
Payer: COMMERCIAL

## 2020-05-06 ENCOUNTER — TELEPHONE (OUTPATIENT)
Dept: OBGYN | Facility: CLINIC | Age: 35
End: 2020-05-06

## 2020-05-06 ENCOUNTER — PRE VISIT (OUTPATIENT)
Dept: MATERNAL FETAL MEDICINE | Facility: CLINIC | Age: 35
End: 2020-05-06

## 2020-05-06 VITALS — SYSTOLIC BLOOD PRESSURE: 125 MMHG | HEART RATE: 94 BPM | DIASTOLIC BLOOD PRESSURE: 80 MMHG

## 2020-05-06 DIAGNOSIS — Z01.30 BLOOD PRESSURE CHECK: Primary | ICD-10-CM

## 2020-05-06 PROCEDURE — 99207 ZZC NO CHARGE NURSE ONLY: CPT

## 2020-05-06 NOTE — TELEPHONE ENCOUNTER
Pt calling stating she would like order for MTFM be [facility] to the [facility] Would like to know how long it takes to get scheduled for this?  Had past experiences w/ high BP w/ her son and is now starting to experience the same symptoms. Would like to know if she can get this check out?    Please call    Melissa Dupont  Specialty CSS

## 2020-05-06 NOTE — TELEPHONE ENCOUNTER
"Return call to patient.  Spoke with patient on the phone.    S-(situation): Patient reports noticing some hand swelling and feel swelling last night. Patient recalls this happening with her last pregnancy at the end. Patient reports she feels the swelling is a little bit better today. Patient said at times it keeps her awake at night. Patient reports she developed hypertension when she had these symptoms so she would like a BP check. Patient also reports occasional chest tightness that comes and goes. Patient reports she does not feel short of breath and feels she is able to take a deep breath but \" my chest just feels tight.\" Patient reports \" this happened last time as well.\" Patient does not notice it happening with exertion or at any certain time throughout the day, just random.pt able to speak in complete sentences with complete thoughts. Patient breathing while on the phone is non labored. Patient denies fever, muscle aches or pains. Patient screening for COVID 19 negative.  Patient denies headaches or visual changes. Patient denies RUQ pain. Patient reports + fetal movement. Patient denies leaking of fluid, bright red bleeding or uterine contractions.    Patient also inquiring about [facility] referral and appointment for this.     B-(background):  at 35W3D.     A-(assessment): swelling , occasional tight in chest feeling    R-(recommendations): Reviewed with patient swelling and tendency for this to be a normal finding at the end of pregnancy if it is mild. Improvement or fluctuations can be normal.  Recommended patient monitor her dietary intake for processed or higher sodium content foods. Push extra water and fluids. Try elevation at night of feet with pillows. Continue to monitor. Keep an eye on the chest tightness. Further evaluation with worsening symptoms, SOB, chest pain, inability to speak in complete  sentences and inability to complete daily activities.     Patient " advised that she may come to clinic today for a BP check. Scheduled for 1 pm.    Patient given the phone number for [facility] for scheduling an appointment from the referral from Dr. Sadler. She will call if she does not hear back from them shortly.    Mandi Deng   Ob/Gyn Clinic  RN

## 2020-05-07 ENCOUNTER — OFFICE VISIT (OUTPATIENT)
Dept: MATERNAL FETAL MEDICINE | Facility: CLINIC | Age: 35
End: 2020-05-07
Attending: OBSTETRICS & GYNECOLOGY
Payer: COMMERCIAL

## 2020-05-07 ENCOUNTER — HOSPITAL ENCOUNTER (OUTPATIENT)
Dept: ULTRASOUND IMAGING | Facility: CLINIC | Age: 35
End: 2020-05-07
Attending: OBSTETRICS & GYNECOLOGY
Payer: COMMERCIAL

## 2020-05-07 DIAGNOSIS — O36.60X0 EXCESSIVE FETAL GROWTH AFFECTING MANAGEMENT OF PREGNANCY, ANTEPARTUM, SINGLE OR UNSPECIFIED FETUS: Primary | ICD-10-CM

## 2020-05-07 DIAGNOSIS — O26.90 PREGNANCY RELATED CONDITION, ANTEPARTUM: ICD-10-CM

## 2020-05-07 DIAGNOSIS — Z34.83 PRENATAL CARE, SUBSEQUENT PREGNANCY IN THIRD TRIMESTER: Primary | ICD-10-CM

## 2020-05-07 PROCEDURE — 76811 OB US DETAILED SNGL FETUS: CPT

## 2020-05-07 NOTE — PROGRESS NOTES
Please refer to ultrasound report under 'Imaging' Studies of 'Chart Review' tabs.    Ramon Clinton M.D.

## 2020-05-13 ENCOUNTER — HOSPITAL ENCOUNTER (OUTPATIENT)
Dept: ULTRASOUND IMAGING | Facility: CLINIC | Age: 35
Discharge: HOME | End: 2020-05-13
Attending: OBSTETRICS & GYNECOLOGY | Admitting: OBSTETRICS & GYNECOLOGY
Payer: COMMERCIAL

## 2020-05-13 ENCOUNTER — PRENATAL OFFICE VISIT (OUTPATIENT)
Dept: OBGYN | Facility: CLINIC | Age: 35
End: 2020-05-13
Payer: COMMERCIAL

## 2020-05-13 VITALS
TEMPERATURE: 97.6 F | DIASTOLIC BLOOD PRESSURE: 82 MMHG | WEIGHT: 208 LBS | HEIGHT: 65 IN | SYSTOLIC BLOOD PRESSURE: 122 MMHG | HEART RATE: 96 BPM | BODY MASS INDEX: 34.66 KG/M2 | RESPIRATION RATE: 18 BRPM

## 2020-05-13 DIAGNOSIS — Z34.83 PRENATAL CARE, SUBSEQUENT PREGNANCY IN THIRD TRIMESTER: Primary | ICD-10-CM

## 2020-05-13 DIAGNOSIS — Z34.83 ENCOUNTER FOR SUPERVISION OF OTHER NORMAL PREGNANCY IN THIRD TRIMESTER: ICD-10-CM

## 2020-05-13 DIAGNOSIS — O26.90 PREGNANCY RELATED CONDITION, ANTEPARTUM: ICD-10-CM

## 2020-05-13 DIAGNOSIS — Z34.83 PRENATAL CARE, SUBSEQUENT PREGNANCY IN THIRD TRIMESTER: ICD-10-CM

## 2020-05-13 PROCEDURE — 76819 FETAL BIOPHYS PROFIL W/O NST: CPT

## 2020-05-13 PROCEDURE — 99207 ZZC PRENATAL VISIT: CPT | Performed by: OBSTETRICS & GYNECOLOGY

## 2020-05-13 PROCEDURE — 87653 STREP B DNA AMP PROBE: CPT | Performed by: OBSTETRICS & GYNECOLOGY

## 2020-05-13 ASSESSMENT — MIFFLIN-ST. JEOR: SCORE: 1644.36

## 2020-05-13 NOTE — NURSING NOTE
"Initial /82 (BP Location: Right arm, Patient Position: Chair, Cuff Size: Adult Large)   Pulse 96   Temp 97.6  F (36.4  C) (Oral)   Resp 18   Ht 1.651 m (5' 5\")   Wt 94.3 kg (208 lb)   LMP 09/01/2019   BMI 34.61 kg/m   Estimated body mass index is 34.61 kg/m  as calculated from the following:    Height as of this encounter: 1.651 m (5' 5\").    Weight as of this encounter: 94.3 kg (208 lb). .      "

## 2020-05-13 NOTE — PROGRESS NOTES
Concerns: BPP *8/8  .  Doing well.  No concerns today.  No vaginal bleeding, LOF, contractions.  No HA, RUQ pain, N/V, visual changes.  GBS done today.  Labor precautions discussed.  RTC 1 week.  Prenatal flowsheet information is reviewed.    Mehrdad Valenzuela MD

## 2020-05-14 LAB
GP B STREP DNA SPEC QL NAA+PROBE: NEGATIVE
SPECIMEN SOURCE: NORMAL

## 2020-05-19 ENCOUNTER — HOSPITAL ENCOUNTER (OUTPATIENT)
Dept: ULTRASOUND IMAGING | Facility: CLINIC | Age: 35
Discharge: HOME | End: 2020-05-19
Attending: OBSTETRICS & GYNECOLOGY | Admitting: OBSTETRICS & GYNECOLOGY
Payer: COMMERCIAL

## 2020-05-19 ENCOUNTER — PRENATAL OFFICE VISIT (OUTPATIENT)
Dept: OBGYN | Facility: CLINIC | Age: 35
End: 2020-05-19
Payer: COMMERCIAL

## 2020-05-19 VITALS
HEIGHT: 65 IN | HEART RATE: 87 BPM | SYSTOLIC BLOOD PRESSURE: 136 MMHG | TEMPERATURE: 98.1 F | BODY MASS INDEX: 34.84 KG/M2 | RESPIRATION RATE: 16 BRPM | DIASTOLIC BLOOD PRESSURE: 86 MMHG | WEIGHT: 209.1 LBS

## 2020-05-19 DIAGNOSIS — O26.90 PREGNANCY RELATED CONDITION, ANTEPARTUM: ICD-10-CM

## 2020-05-19 DIAGNOSIS — Z34.83 PRENATAL CARE, SUBSEQUENT PREGNANCY IN THIRD TRIMESTER: ICD-10-CM

## 2020-05-19 DIAGNOSIS — Z34.83 PRENATAL CARE, SUBSEQUENT PREGNANCY IN THIRD TRIMESTER: Primary | ICD-10-CM

## 2020-05-19 PROCEDURE — 76819 FETAL BIOPHYS PROFIL W/O NST: CPT

## 2020-05-19 PROCEDURE — 99207 ZZC PRENATAL VISIT: CPT | Performed by: OBSTETRICS & GYNECOLOGY

## 2020-05-19 ASSESSMENT — MIFFLIN-ST. JEOR: SCORE: 1649.35

## 2020-05-19 NOTE — PROGRESS NOTES
Concerns: carpal tunnel right using a wrist brace   Itching of her abdomen- responds to lotion  Bile acids normal ay the last visit  No vaginal bleeding, LOF, contractions.  No HA, RUQ pain, N/V, visual changes.  Cervix is unchanged from previous exam.  Cephalic position confirmed by Leopold maneuvers and cervical exam.  Discussed indications, risks, complications and failure rate of inductions.  Reportable signs and symptoms discussed.  Labor precautions discussed.  BPP today  RTC 1 week.    Mehrdad Valenzuela MD

## 2020-05-26 ENCOUNTER — HOSPITAL ENCOUNTER (OUTPATIENT)
Facility: CLINIC | Age: 35
Discharge: HOME | End: 2020-05-26
Attending: OBSTETRICS & GYNECOLOGY | Admitting: OBSTETRICS & GYNECOLOGY
Payer: COMMERCIAL

## 2020-05-26 ENCOUNTER — HOSPITAL ENCOUNTER (OUTPATIENT)
Dept: ULTRASOUND IMAGING | Facility: CLINIC | Age: 35
Discharge: HOME | End: 2020-05-26
Attending: OBSTETRICS & GYNECOLOGY | Admitting: OBSTETRICS & GYNECOLOGY
Payer: COMMERCIAL

## 2020-05-26 ENCOUNTER — PRENATAL OFFICE VISIT (OUTPATIENT)
Dept: OBGYN | Facility: CLINIC | Age: 35
End: 2020-05-26
Payer: COMMERCIAL

## 2020-05-26 VITALS
TEMPERATURE: 98.2 F | WEIGHT: 208.4 LBS | HEART RATE: 106 BPM | DIASTOLIC BLOOD PRESSURE: 90 MMHG | RESPIRATION RATE: 18 BRPM | SYSTOLIC BLOOD PRESSURE: 145 MMHG | HEIGHT: 65 IN | BODY MASS INDEX: 34.72 KG/M2

## 2020-05-26 VITALS
DIASTOLIC BLOOD PRESSURE: 82 MMHG | RESPIRATION RATE: 16 BRPM | SYSTOLIC BLOOD PRESSURE: 132 MMHG | HEART RATE: 86 BPM | TEMPERATURE: 97.9 F

## 2020-05-26 DIAGNOSIS — Z34.83 PRENATAL CARE, SUBSEQUENT PREGNANCY IN THIRD TRIMESTER: ICD-10-CM

## 2020-05-26 DIAGNOSIS — Z34.83 PRENATAL CARE, SUBSEQUENT PREGNANCY IN THIRD TRIMESTER: Primary | ICD-10-CM

## 2020-05-26 DIAGNOSIS — O26.90 PREGNANCY RELATED CONDITION, ANTEPARTUM: ICD-10-CM

## 2020-05-26 DIAGNOSIS — N89.8 VAGINAL ITCHING: ICD-10-CM

## 2020-05-26 DIAGNOSIS — N89.8 VAGINAL DISCHARGE: ICD-10-CM

## 2020-05-26 PROBLEM — O16.3 ELEVATED BLOOD PRESSURE AFFECTING PREGNANCY IN THIRD TRIMESTER, ANTEPARTUM: Status: ACTIVE | Noted: 2020-05-26

## 2020-05-26 LAB
ALT SERPL W P-5'-P-CCNC: 27 U/L (ref 0–50)
AST SERPL W P-5'-P-CCNC: 20 U/L (ref 0–45)
CREAT SERPL-MCNC: 0.74 MG/DL (ref 0.52–1.04)
ERYTHROCYTE [DISTWIDTH] IN BLOOD BY AUTOMATED COUNT: 13.2 % (ref 10–15)
GFR SERPL CREATININE-BSD FRML MDRD: >90 ML/MIN/{1.73_M2}
HCT VFR BLD AUTO: 36.3 % (ref 35–47)
HGB BLD-MCNC: 12.2 G/DL (ref 11.7–15.7)
MCH RBC QN AUTO: 31.6 PG (ref 26.5–33)
MCHC RBC AUTO-ENTMCNC: 33.6 G/DL (ref 31.5–36.5)
MCV RBC AUTO: 94 FL (ref 78–100)
PLATELET # BLD AUTO: 314 10E9/L (ref 150–450)
PROT UR-MCNC: <0.05 G/L
PROT/CREAT 24H UR: NORMAL G/G CR (ref 0–0.2)
RBC # BLD AUTO: 3.86 10E12/L (ref 3.8–5.2)
SPECIMEN SOURCE: NORMAL
WBC # BLD AUTO: 10.5 10E9/L (ref 4–11)
WET PREP SPEC: NORMAL

## 2020-05-26 PROCEDURE — 85027 COMPLETE CBC AUTOMATED: CPT | Performed by: OBSTETRICS & GYNECOLOGY

## 2020-05-26 PROCEDURE — 84450 TRANSFERASE (AST) (SGOT): CPT | Performed by: OBSTETRICS & GYNECOLOGY

## 2020-05-26 PROCEDURE — 76819 FETAL BIOPHYS PROFIL W/O NST: CPT

## 2020-05-26 PROCEDURE — 84156 ASSAY OF PROTEIN URINE: CPT | Performed by: OBSTETRICS & GYNECOLOGY

## 2020-05-26 PROCEDURE — 84460 ALANINE AMINO (ALT) (SGPT): CPT | Performed by: OBSTETRICS & GYNECOLOGY

## 2020-05-26 PROCEDURE — 36415 COLL VENOUS BLD VENIPUNCTURE: CPT | Performed by: OBSTETRICS & GYNECOLOGY

## 2020-05-26 PROCEDURE — 87210 SMEAR WET MOUNT SALINE/INK: CPT | Performed by: OBSTETRICS & GYNECOLOGY

## 2020-05-26 PROCEDURE — 82565 ASSAY OF CREATININE: CPT | Performed by: OBSTETRICS & GYNECOLOGY

## 2020-05-26 PROCEDURE — 99207 ZZC PRENATAL VISIT: CPT | Performed by: OBSTETRICS & GYNECOLOGY

## 2020-05-26 PROCEDURE — G0463 HOSPITAL OUTPT CLINIC VISIT: HCPCS

## 2020-05-26 RX ORDER — ONDANSETRON 2 MG/ML
4 INJECTION INTRAMUSCULAR; INTRAVENOUS EVERY 6 HOURS PRN
Status: CANCELLED | OUTPATIENT
Start: 2020-05-26

## 2020-05-26 RX ORDER — TRANEXAMIC ACID 10 MG/ML
1 INJECTION, SOLUTION INTRAVENOUS EVERY 30 MIN PRN
Status: CANCELLED | OUTPATIENT
Start: 2020-05-26

## 2020-05-26 RX ORDER — OXYTOCIN/0.9 % SODIUM CHLORIDE 30/500 ML
1-24 PLASTIC BAG, INJECTION (ML) INTRAVENOUS CONTINUOUS
Status: CANCELLED | OUTPATIENT
Start: 2020-05-26

## 2020-05-26 RX ORDER — OXYTOCIN 10 [USP'U]/ML
10 INJECTION, SOLUTION INTRAMUSCULAR; INTRAVENOUS
Status: CANCELLED | OUTPATIENT
Start: 2020-05-26

## 2020-05-26 RX ORDER — IBUPROFEN 800 MG/1
800 TABLET, FILM COATED ORAL
Status: CANCELLED | OUTPATIENT
Start: 2020-05-26

## 2020-05-26 RX ORDER — LIDOCAINE 40 MG/G
CREAM TOPICAL
Status: CANCELLED | OUTPATIENT
Start: 2020-05-26

## 2020-05-26 RX ORDER — ONDANSETRON 2 MG/ML
4 INJECTION INTRAMUSCULAR; INTRAVENOUS EVERY 6 HOURS PRN
Status: DISCONTINUED | OUTPATIENT
Start: 2020-05-26 | End: 2020-05-26 | Stop reason: HOSPADM

## 2020-05-26 RX ORDER — METHYLERGONOVINE MALEATE 0.2 MG/ML
200 INJECTION INTRAVENOUS
Status: CANCELLED | OUTPATIENT
Start: 2020-05-26

## 2020-05-26 RX ORDER — SODIUM CHLORIDE, SODIUM LACTATE, POTASSIUM CHLORIDE, CALCIUM CHLORIDE 600; 310; 30; 20 MG/100ML; MG/100ML; MG/100ML; MG/100ML
INJECTION, SOLUTION INTRAVENOUS CONTINUOUS
Status: CANCELLED | OUTPATIENT
Start: 2020-05-26

## 2020-05-26 RX ORDER — NALOXONE HYDROCHLORIDE 0.4 MG/ML
.1-.4 INJECTION, SOLUTION INTRAMUSCULAR; INTRAVENOUS; SUBCUTANEOUS
Status: CANCELLED | OUTPATIENT
Start: 2020-05-26

## 2020-05-26 RX ORDER — ACETAMINOPHEN 325 MG/1
650 TABLET ORAL EVERY 4 HOURS PRN
Status: CANCELLED | OUTPATIENT
Start: 2020-05-26

## 2020-05-26 RX ORDER — CARBOPROST TROMETHAMINE 250 UG/ML
250 INJECTION, SOLUTION INTRAMUSCULAR
Status: CANCELLED | OUTPATIENT
Start: 2020-05-26

## 2020-05-26 RX ORDER — FENTANYL CITRATE 50 UG/ML
50-100 INJECTION, SOLUTION INTRAMUSCULAR; INTRAVENOUS
Status: CANCELLED | OUTPATIENT
Start: 2020-05-26

## 2020-05-26 RX ORDER — OXYCODONE AND ACETAMINOPHEN 5; 325 MG/1; MG/1
1 TABLET ORAL
Status: CANCELLED | OUTPATIENT
Start: 2020-05-26

## 2020-05-26 RX ORDER — OXYTOCIN/0.9 % SODIUM CHLORIDE 30/500 ML
100-340 PLASTIC BAG, INJECTION (ML) INTRAVENOUS CONTINUOUS PRN
Status: CANCELLED | OUTPATIENT
Start: 2020-05-26

## 2020-05-26 ASSESSMENT — MIFFLIN-ST. JEOR: SCORE: 1641.18

## 2020-05-26 NOTE — NURSING NOTE
"Initial BP (!) 136/92 (BP Location: Right arm, Patient Position: Chair, Cuff Size: Adult Large)   Pulse 106   Temp 98.2  F (36.8  C) (Tympanic)   Resp 18   Ht 1.651 m (5' 5\")   Wt 94.5 kg (208 lb 6.4 oz)   LMP 09/01/2019   Breastfeeding No   BMI 34.68 kg/m   Estimated body mass index is 34.68 kg/m  as calculated from the following:    Height as of this encounter: 1.651 m (5' 5\").    Weight as of this encounter: 94.5 kg (208 lb 6.4 oz). .    Gisele Greene CMA    " No

## 2020-05-26 NOTE — PROGRESS NOTES
"CC: Here for routine prenatal visit @ 38w2d   HPI: + FM, no ctx, no LOF, no VB.  No complaints.     PE: BP (!) 136/92 (BP Location: Right arm, Patient Position: Chair, Cuff Size: Adult Large)   Pulse 106   Temp 98.2  F (36.8  C) (Tympanic)   Resp 18   Ht 1.651 m (5' 5\")   Wt 94.5 kg (208 lb 6.4 oz)   LMP 2019   Breastfeeding No   BMI 34.68 kg/m     See OB flowsheet    BPP after today's visit  Cervix anterior and soft    A/P  @ 38w2d normal pregnancy    1. Routine prenatal care  2. AC>HC with presumed glucose intolerance.  IOL--will plan on  if patient does not meet IOL criteria for blood pressure.     RTC 6 weeks.      Destiny Cates M.D.    "

## 2020-05-27 NOTE — PROGRESS NOTES
Patient came from clinic for evaluation due to some mildly elevated bps in the clinic.  Patient had labs completed, reactive NST, and normotensive bps.  Dr. Harrington reviewed results and patient was discharged.  There are several bp readings from the clinic that appear to be taken while patient was in my care.  I was not able to remove them from the chart.

## 2020-05-28 DIAGNOSIS — Z34.83 PRENATAL CARE, SUBSEQUENT PREGNANCY IN THIRD TRIMESTER: ICD-10-CM

## 2020-05-28 PROCEDURE — 99000 SPECIMEN HANDLING OFFICE-LAB: CPT | Performed by: OBSTETRICS & GYNECOLOGY

## 2020-05-28 PROCEDURE — U0003 INFECTIOUS AGENT DETECTION BY NUCLEIC ACID (DNA OR RNA); SEVERE ACUTE RESPIRATORY SYNDROME CORONAVIRUS 2 (SARS-COV-2) (CORONAVIRUS DISEASE [COVID-19]), AMPLIFIED PROBE TECHNIQUE, MAKING USE OF HIGH THROUGHPUT TECHNOLOGIES AS DESCRIBED BY CMS-2020-01-R: HCPCS | Mod: 90 | Performed by: OBSTETRICS & GYNECOLOGY

## 2020-05-28 PROCEDURE — 99207 ZZC NO CHARGE NURSE ONLY: CPT

## 2020-05-29 LAB
SARS-COV-2 RNA SPEC QL NAA+PROBE: NOT DETECTED
SPECIMEN SOURCE: NORMAL

## 2020-05-31 ENCOUNTER — ANESTHESIA EVENT (OUTPATIENT)
Dept: OBGYN | Facility: CLINIC | Age: 35
End: 2020-05-31
Payer: COMMERCIAL

## 2020-05-31 ENCOUNTER — ANESTHESIA (OUTPATIENT)
Dept: OBGYN | Facility: CLINIC | Age: 35
End: 2020-05-31
Payer: COMMERCIAL

## 2020-05-31 ENCOUNTER — HOSPITAL ENCOUNTER (INPATIENT)
Facility: CLINIC | Age: 35
LOS: 1 days | Discharge: HOME | End: 2020-06-01
Attending: OBSTETRICS & GYNECOLOGY | Admitting: OBSTETRICS & GYNECOLOGY
Payer: COMMERCIAL

## 2020-05-31 PROBLEM — Z34.83 PRENATAL CARE, SUBSEQUENT PREGNANCY IN THIRD TRIMESTER: Status: ACTIVE | Noted: 2020-05-31

## 2020-05-31 LAB
ABO + RH BLD: NORMAL
ABO + RH BLD: NORMAL
BASOPHILS # BLD AUTO: 0.1 10E9/L (ref 0–0.2)
BASOPHILS NFR BLD AUTO: 0.7 %
DIFFERENTIAL METHOD BLD: ABNORMAL
EOSINOPHIL # BLD AUTO: 0.1 10E9/L (ref 0–0.7)
EOSINOPHIL NFR BLD AUTO: 0.9 %
ERYTHROCYTE [DISTWIDTH] IN BLOOD BY AUTOMATED COUNT: 13.2 % (ref 10–15)
HCT VFR BLD AUTO: 35.4 % (ref 35–47)
HGB BLD-MCNC: 12 G/DL (ref 11.7–15.7)
IMM GRANULOCYTES # BLD: 0.1 10E9/L (ref 0–0.4)
IMM GRANULOCYTES NFR BLD: 0.4 %
LYMPHOCYTES # BLD AUTO: 2.7 10E9/L (ref 0.8–5.3)
LYMPHOCYTES NFR BLD AUTO: 23.3 %
MCH RBC QN AUTO: 32.2 PG (ref 26.5–33)
MCHC RBC AUTO-ENTMCNC: 33.9 G/DL (ref 31.5–36.5)
MCV RBC AUTO: 95 FL (ref 78–100)
MONOCYTES # BLD AUTO: 1 10E9/L (ref 0–1.3)
MONOCYTES NFR BLD AUTO: 8.5 %
NEUTROPHILS # BLD AUTO: 7.5 10E9/L (ref 1.6–8.3)
NEUTROPHILS NFR BLD AUTO: 66.2 %
NRBC # BLD AUTO: 0 10*3/UL
NRBC BLD AUTO-RTO: 0 /100
PLATELET # BLD AUTO: 296 10E9/L (ref 150–450)
RBC # BLD AUTO: 3.73 10E12/L (ref 3.8–5.2)
SPECIMEN EXP DATE BLD: NORMAL
WBC # BLD AUTO: 11.4 10E9/L (ref 4–11)

## 2020-05-31 PROCEDURE — 12000000 ZZH R&B MED SURG/OB

## 2020-05-31 PROCEDURE — 72200001 ZZH LABOR CARE VAGINAL DELIVERY SINGLE

## 2020-05-31 PROCEDURE — 25000128 H RX IP 250 OP 636: Performed by: NURSE ANESTHETIST, CERTIFIED REGISTERED

## 2020-05-31 PROCEDURE — 25800030 ZZH RX IP 258 OP 636: Performed by: OBSTETRICS & GYNECOLOGY

## 2020-05-31 PROCEDURE — 10907ZC DRAINAGE OF AMNIOTIC FLUID, THERAPEUTIC FROM PRODUCTS OF CONCEPTION, VIA NATURAL OR ARTIFICIAL OPENING: ICD-10-PCS | Performed by: OBSTETRICS & GYNECOLOGY

## 2020-05-31 PROCEDURE — 0KQM0ZZ REPAIR PERINEUM MUSCLE, OPEN APPROACH: ICD-10-PCS | Performed by: OBSTETRICS & GYNECOLOGY

## 2020-05-31 PROCEDURE — 86900 BLOOD TYPING SEROLOGIC ABO: CPT | Performed by: OBSTETRICS & GYNECOLOGY

## 2020-05-31 PROCEDURE — 86901 BLOOD TYPING SEROLOGIC RH(D): CPT | Performed by: OBSTETRICS & GYNECOLOGY

## 2020-05-31 PROCEDURE — 85025 COMPLETE CBC W/AUTO DIFF WBC: CPT | Performed by: OBSTETRICS & GYNECOLOGY

## 2020-05-31 PROCEDURE — 25000125 ZZHC RX 250: Performed by: NURSE ANESTHETIST, CERTIFIED REGISTERED

## 2020-05-31 PROCEDURE — 3E0R3BZ INTRODUCTION OF ANESTHETIC AGENT INTO SPINAL CANAL, PERCUTANEOUS APPROACH: ICD-10-PCS | Performed by: OBSTETRICS & GYNECOLOGY

## 2020-05-31 PROCEDURE — 3E033VJ INTRODUCTION OF OTHER HORMONE INTO PERIPHERAL VEIN, PERCUTANEOUS APPROACH: ICD-10-PCS | Performed by: OBSTETRICS & GYNECOLOGY

## 2020-05-31 PROCEDURE — 40000671 ZZH STATISTIC ANESTHESIA CASE

## 2020-05-31 PROCEDURE — 86780 TREPONEMA PALLIDUM: CPT | Performed by: OBSTETRICS & GYNECOLOGY

## 2020-05-31 PROCEDURE — 25000125 ZZHC RX 250: Performed by: OBSTETRICS & GYNECOLOGY

## 2020-05-31 PROCEDURE — 59400 OBSTETRICAL CARE: CPT | Performed by: OBSTETRICS & GYNECOLOGY

## 2020-05-31 PROCEDURE — 37000011 ZZH ANESTHESIA WARD SERVICE: Performed by: NURSE ANESTHETIST, CERTIFIED REGISTERED

## 2020-05-31 PROCEDURE — 00HU33Z INSERTION OF INFUSION DEVICE INTO SPINAL CANAL, PERCUTANEOUS APPROACH: ICD-10-PCS | Performed by: OBSTETRICS & GYNECOLOGY

## 2020-05-31 PROCEDURE — 25000132 ZZH RX MED GY IP 250 OP 250 PS 637: Performed by: OBSTETRICS & GYNECOLOGY

## 2020-05-31 RX ORDER — MODIFIED LANOLIN
OINTMENT (GRAM) TOPICAL
Status: DISCONTINUED | OUTPATIENT
Start: 2020-05-31 | End: 2020-06-01 | Stop reason: HOSPADM

## 2020-05-31 RX ORDER — NALBUPHINE HYDROCHLORIDE 10 MG/ML
2.5-5 INJECTION, SOLUTION INTRAMUSCULAR; INTRAVENOUS; SUBCUTANEOUS EVERY 6 HOURS PRN
Status: DISCONTINUED | OUTPATIENT
Start: 2020-05-31 | End: 2020-05-31

## 2020-05-31 RX ORDER — OXYTOCIN/0.9 % SODIUM CHLORIDE 30/500 ML
100 PLASTIC BAG, INJECTION (ML) INTRAVENOUS CONTINUOUS
Status: DISCONTINUED | OUTPATIENT
Start: 2020-05-31 | End: 2020-06-01 | Stop reason: HOSPADM

## 2020-05-31 RX ORDER — LIDOCAINE HYDROCHLORIDE 10 MG/ML
INJECTION, SOLUTION INFILTRATION; PERINEURAL PRN
Status: DISCONTINUED | OUTPATIENT
Start: 2020-05-31 | End: 2020-05-31

## 2020-05-31 RX ORDER — FENTANYL CITRATE 50 UG/ML
INJECTION, SOLUTION INTRAMUSCULAR; INTRAVENOUS PRN
Status: DISCONTINUED | OUTPATIENT
Start: 2020-05-31 | End: 2020-05-31

## 2020-05-31 RX ORDER — HYDROCORTISONE 2.5 %
CREAM (GRAM) TOPICAL 3 TIMES DAILY PRN
Status: DISCONTINUED | OUTPATIENT
Start: 2020-05-31 | End: 2020-06-01 | Stop reason: HOSPADM

## 2020-05-31 RX ORDER — NALOXONE HYDROCHLORIDE 0.4 MG/ML
.1-.4 INJECTION, SOLUTION INTRAMUSCULAR; INTRAVENOUS; SUBCUTANEOUS
Status: DISCONTINUED | OUTPATIENT
Start: 2020-05-31 | End: 2020-05-31

## 2020-05-31 RX ORDER — ONDANSETRON 2 MG/ML
4 INJECTION INTRAMUSCULAR; INTRAVENOUS EVERY 6 HOURS PRN
Status: DISCONTINUED | OUTPATIENT
Start: 2020-05-31 | End: 2020-05-31

## 2020-05-31 RX ORDER — LIDOCAINE HYDROCHLORIDE 10 MG/ML
INJECTION, SOLUTION EPIDURAL; INFILTRATION; INTRACAUDAL; PERINEURAL
Status: DISCONTINUED
Start: 2020-05-31 | End: 2020-05-31 | Stop reason: HOSPADM

## 2020-05-31 RX ORDER — EPHEDRINE SULFATE 50 MG/ML
5 INJECTION, SOLUTION INTRAMUSCULAR; INTRAVENOUS; SUBCUTANEOUS
Status: DISCONTINUED | OUTPATIENT
Start: 2020-05-31 | End: 2020-05-31

## 2020-05-31 RX ORDER — OXYTOCIN 10 [USP'U]/ML
10 INJECTION, SOLUTION INTRAMUSCULAR; INTRAVENOUS
Status: DISCONTINUED | OUTPATIENT
Start: 2020-05-31 | End: 2020-05-31

## 2020-05-31 RX ORDER — OXYTOCIN/0.9 % SODIUM CHLORIDE 30/500 ML
100-340 PLASTIC BAG, INJECTION (ML) INTRAVENOUS CONTINUOUS PRN
Status: COMPLETED | OUTPATIENT
Start: 2020-05-31 | End: 2020-05-31

## 2020-05-31 RX ORDER — BISACODYL 10 MG
10 SUPPOSITORY, RECTAL RECTAL DAILY PRN
Status: DISCONTINUED | OUTPATIENT
Start: 2020-06-02 | End: 2020-06-01 | Stop reason: HOSPADM

## 2020-05-31 RX ORDER — LIDOCAINE HYDROCHLORIDE AND EPINEPHRINE 15; 5 MG/ML; UG/ML
INJECTION, SOLUTION EPIDURAL PRN
Status: DISCONTINUED | OUTPATIENT
Start: 2020-05-31 | End: 2020-05-31

## 2020-05-31 RX ORDER — TRANEXAMIC ACID 10 MG/ML
1 INJECTION, SOLUTION INTRAVENOUS EVERY 30 MIN PRN
Status: DISCONTINUED | OUTPATIENT
Start: 2020-05-31 | End: 2020-05-31

## 2020-05-31 RX ORDER — AMOXICILLIN 250 MG
2 CAPSULE ORAL 2 TIMES DAILY
Status: DISCONTINUED | OUTPATIENT
Start: 2020-05-31 | End: 2020-06-01 | Stop reason: HOSPADM

## 2020-05-31 RX ORDER — MISOPROSTOL 200 UG/1
400 TABLET ORAL
Status: DISCONTINUED | OUTPATIENT
Start: 2020-05-31 | End: 2020-06-01 | Stop reason: HOSPADM

## 2020-05-31 RX ORDER — IBUPROFEN 800 MG/1
800 TABLET, FILM COATED ORAL
Status: DISCONTINUED | OUTPATIENT
Start: 2020-05-31 | End: 2020-05-31

## 2020-05-31 RX ORDER — AMOXICILLIN 250 MG
1 CAPSULE ORAL 2 TIMES DAILY
Status: DISCONTINUED | OUTPATIENT
Start: 2020-05-31 | End: 2020-06-01 | Stop reason: HOSPADM

## 2020-05-31 RX ORDER — TERBUTALINE SULFATE 1 MG/ML
INJECTION, SOLUTION SUBCUTANEOUS
Status: DISCONTINUED
Start: 2020-05-31 | End: 2020-05-31 | Stop reason: HOSPADM

## 2020-05-31 RX ORDER — VALACYCLOVIR HYDROCHLORIDE 500 MG/1
500 TABLET, FILM COATED ORAL DAILY
Status: DISCONTINUED | OUTPATIENT
Start: 2020-05-31 | End: 2020-06-01 | Stop reason: HOSPADM

## 2020-05-31 RX ORDER — IBUPROFEN 800 MG/1
800 TABLET, FILM COATED ORAL EVERY 6 HOURS PRN
Status: DISCONTINUED | OUTPATIENT
Start: 2020-05-31 | End: 2020-06-01 | Stop reason: HOSPADM

## 2020-05-31 RX ORDER — TRANEXAMIC ACID 10 MG/ML
1 INJECTION, SOLUTION INTRAVENOUS EVERY 30 MIN PRN
Status: DISCONTINUED | OUTPATIENT
Start: 2020-05-31 | End: 2020-06-01 | Stop reason: HOSPADM

## 2020-05-31 RX ORDER — OXYTOCIN/0.9 % SODIUM CHLORIDE 30/500 ML
340 PLASTIC BAG, INJECTION (ML) INTRAVENOUS CONTINUOUS PRN
Status: DISCONTINUED | OUTPATIENT
Start: 2020-05-31 | End: 2020-06-01 | Stop reason: HOSPADM

## 2020-05-31 RX ORDER — NALOXONE HYDROCHLORIDE 0.4 MG/ML
.1-.4 INJECTION, SOLUTION INTRAMUSCULAR; INTRAVENOUS; SUBCUTANEOUS
Status: DISCONTINUED | OUTPATIENT
Start: 2020-05-31 | End: 2020-06-01 | Stop reason: HOSPADM

## 2020-05-31 RX ORDER — OXYCODONE AND ACETAMINOPHEN 5; 325 MG/1; MG/1
1 TABLET ORAL
Status: DISCONTINUED | OUTPATIENT
Start: 2020-05-31 | End: 2020-05-31

## 2020-05-31 RX ORDER — FENTANYL CITRATE 50 UG/ML
50-100 INJECTION, SOLUTION INTRAMUSCULAR; INTRAVENOUS
Status: DISCONTINUED | OUTPATIENT
Start: 2020-05-31 | End: 2020-05-31

## 2020-05-31 RX ORDER — ACETAMINOPHEN 325 MG/1
650 TABLET ORAL EVERY 4 HOURS PRN
Status: DISCONTINUED | OUTPATIENT
Start: 2020-05-31 | End: 2020-05-31

## 2020-05-31 RX ORDER — OXYTOCIN/0.9 % SODIUM CHLORIDE 30/500 ML
1-24 PLASTIC BAG, INJECTION (ML) INTRAVENOUS CONTINUOUS
Status: DISCONTINUED | OUTPATIENT
Start: 2020-05-31 | End: 2020-05-31

## 2020-05-31 RX ORDER — OXYTOCIN 10 [USP'U]/ML
10 INJECTION, SOLUTION INTRAMUSCULAR; INTRAVENOUS
Status: DISCONTINUED | OUTPATIENT
Start: 2020-05-31 | End: 2020-06-01 | Stop reason: HOSPADM

## 2020-05-31 RX ORDER — PRENATAL VIT/IRON FUM/FOLIC AC 27MG-0.8MG
1 TABLET ORAL DAILY
Status: DISCONTINUED | OUTPATIENT
Start: 2020-05-31 | End: 2020-06-01 | Stop reason: HOSPADM

## 2020-05-31 RX ORDER — OXYCODONE HYDROCHLORIDE 5 MG/1
5 TABLET ORAL EVERY 4 HOURS PRN
Status: DISCONTINUED | OUTPATIENT
Start: 2020-05-31 | End: 2020-06-01 | Stop reason: HOSPADM

## 2020-05-31 RX ORDER — ONDANSETRON 4 MG/1
4 TABLET, ORALLY DISINTEGRATING ORAL EVERY 6 HOURS PRN
Status: DISCONTINUED | OUTPATIENT
Start: 2020-05-31 | End: 2020-05-31

## 2020-05-31 RX ORDER — LIDOCAINE 40 MG/G
CREAM TOPICAL
Status: DISCONTINUED | OUTPATIENT
Start: 2020-05-31 | End: 2020-05-31

## 2020-05-31 RX ORDER — SODIUM CHLORIDE, SODIUM LACTATE, POTASSIUM CHLORIDE, CALCIUM CHLORIDE 600; 310; 30; 20 MG/100ML; MG/100ML; MG/100ML; MG/100ML
INJECTION, SOLUTION INTRAVENOUS CONTINUOUS
Status: DISCONTINUED | OUTPATIENT
Start: 2020-05-31 | End: 2020-05-31

## 2020-05-31 RX ORDER — CARBOPROST TROMETHAMINE 250 UG/ML
250 INJECTION, SOLUTION INTRAMUSCULAR
Status: DISCONTINUED | OUTPATIENT
Start: 2020-05-31 | End: 2020-05-31

## 2020-05-31 RX ORDER — METHYLERGONOVINE MALEATE 0.2 MG/ML
200 INJECTION INTRAVENOUS
Status: DISCONTINUED | OUTPATIENT
Start: 2020-05-31 | End: 2020-05-31

## 2020-05-31 RX ORDER — ACETAMINOPHEN 325 MG/1
650 TABLET ORAL EVERY 4 HOURS PRN
Status: DISCONTINUED | OUTPATIENT
Start: 2020-05-31 | End: 2020-06-01 | Stop reason: HOSPADM

## 2020-05-31 RX ADMIN — DOCUSATE SODIUM AND SENNOSIDES 1 TABLET: 8.6; 5 TABLET, FILM COATED ORAL at 21:47

## 2020-05-31 RX ADMIN — Medication 340 ML/HR: at 16:19

## 2020-05-31 RX ADMIN — Medication 2 MILLI-UNITS/MIN: at 08:17

## 2020-05-31 RX ADMIN — SODIUM CHLORIDE, POTASSIUM CHLORIDE, SODIUM LACTATE AND CALCIUM CHLORIDE: 600; 310; 30; 20 INJECTION, SOLUTION INTRAVENOUS at 08:14

## 2020-05-31 RX ADMIN — IBUPROFEN 800 MG: 800 TABLET ORAL at 22:53

## 2020-05-31 RX ADMIN — Medication 100 ML/HR: at 16:46

## 2020-05-31 RX ADMIN — ACETAMINOPHEN 650 MG: 325 TABLET, FILM COATED ORAL at 22:17

## 2020-05-31 RX ADMIN — IBUPROFEN 800 MG: 800 TABLET ORAL at 17:21

## 2020-05-31 RX ADMIN — SODIUM CHLORIDE, POTASSIUM CHLORIDE, SODIUM LACTATE AND CALCIUM CHLORIDE: 600; 310; 30; 20 INJECTION, SOLUTION INTRAVENOUS at 13:40

## 2020-05-31 RX ADMIN — Medication 5 ML/HR: at 12:04

## 2020-05-31 RX ADMIN — LIDOCAINE HYDROCHLORIDE AND EPINEPHRINE 45 MG: 15; 5 INJECTION, SOLUTION EPIDURAL at 12:01

## 2020-05-31 RX ADMIN — PRENATAL VITAMINS-IRON FUMARATE 27 MG IRON-FOLIC ACID 0.8 MG TABLET 1 TABLET: at 21:47

## 2020-05-31 RX ADMIN — Medication 5 MG: at 12:58

## 2020-05-31 RX ADMIN — LIDOCAINE HYDROCHLORIDE AND EPINEPHRINE 30 MG: 15; 5 INJECTION, SOLUTION EPIDURAL at 12:02

## 2020-05-31 RX ADMIN — LIDOCAINE HYDROCHLORIDE 70 MG: 10 INJECTION, SOLUTION INFILTRATION; PERINEURAL at 10:59

## 2020-05-31 RX ADMIN — SODIUM CHLORIDE, POTASSIUM CHLORIDE, SODIUM LACTATE AND CALCIUM CHLORIDE 500 ML: 600; 310; 30; 20 INJECTION, SOLUTION INTRAVENOUS at 11:49

## 2020-05-31 RX ADMIN — FENTANYL CITRATE 100 MCG: 50 INJECTION, SOLUTION INTRAMUSCULAR; INTRAVENOUS at 12:01

## 2020-05-31 ASSESSMENT — ENCOUNTER SYMPTOMS: SEIZURES: 1

## 2020-05-31 ASSESSMENT — MIFFLIN-ST. JEOR: SCORE: 1643.44

## 2020-05-31 NOTE — PLAN OF CARE
S:Delivery  B:Induced  Labor,39w0d    Lab Results   Component Value Date    GBS Negative 2020    with antibiotic treatment not indicated 4 hours prior to delivery.  A: Patient delivered   lac 2nd degree at 1609 with Dr. RAMIRO aCtes in attendance and baby placed on mother's abdomen for delayed cord clamping. Baby dried and stimulated. Baby placed  skin to skin @ 1612.. Apgars 8/9.  IV infusion of Oxytocin  infused. Placenta removal spontaneous. MD does not want placenta sent to pathology.  QBL 50 ml and documented on flowsheet. See Flowsheet for VS and PP checks. Delivery QBL (mL): 50 mL.  Labor care plan goals met, transition now to postpartum care.  R: Expect routine postpartum care. Anticipate first feeding within the hour or whenever infant displays feeding cues. Continue skin to skin. Prior discussion with mother indicates that feeding plan is Breast feeding . Educated mother on importance of exclusive breastfeeding, expected feeding readiness cues and encouraged her to observe for these cues while rooming in. Informed her that breastfeeding assistance would be provided.

## 2020-05-31 NOTE — PLAN OF CARE
Problem: Adult Inpatient Plan of Care  Goal: Plan of Care Review  Outcome: No Change  Flowsheets (Taken 5/31/2020 0844)  Outcome Summary: admission     S: Admit to Inpatient for induction of labor  A: A:moderate variablility, + accels, no decels, Category I  no uterine activity  posterior, dilated to 2, effaced 50-70%, and soft    Admission on 05/31/2020   Component Date Value    WBC 05/31/2020 11.4*    RBC Count 05/31/2020 3.73*    Hemoglobin 05/31/2020 12.0     Hematocrit 05/31/2020 35.4     MCV 05/31/2020 95     MCH 05/31/2020 32.2     MCHC 05/31/2020 33.9     RDW 05/31/2020 13.2     Platelet Count 05/31/2020 296     Diff Method 05/31/2020 Automated Method     % Neutrophils 05/31/2020 66.2     % Lymphocytes 05/31/2020 23.3     % Monocytes 05/31/2020 8.5     % Eosinophils 05/31/2020 0.9     % Basophils 05/31/2020 0.7     % Immature Granulocytes 05/31/2020 0.4     Nucleated RBCs 05/31/2020 0     Absolute Neutrophil 05/31/2020 7.5     Absolute Lymphocytes 05/31/2020 2.7     Absolute Monocytes 05/31/2020 1.0     Absolute Eosinophils 05/31/2020 0.1     Absolute Basophils 05/31/2020 0.1     Abs Immature Granulocytes 05/31/2020 0.1     Absolute Nucleated RBC 05/31/2020 0.0        Dr. RAMIRO Cates aware of above and admission orders released.   R: Plan of care reviewed with patient. Oriented to room. Reviewed resource binder, [facility] book and paperwork to complete.

## 2020-05-31 NOTE — H&P
[facility] Labor and Delivery History and Physical    Gisele Meadows MRN# [id number]   Age: 35 year old YOB: 1985     Date of Admission:  2020    Primary care provider: [place], [facility]           Chief Complaint:   Gisele Meadows is a 35 year old female who is 39w0d pregnant and being admitted for induction of labor, indication maternal request.          Pregnancy history:     OBSTETRIC HISTORY:    OB History    Para Term  AB Living   2 1 1 0 0 1   SAB TAB Ectopic Multiple Live Births   0 0 0 0 1      # Outcome Date GA Lbr Kirby/2nd Weight Sex Delivery Anes PTL Lv   2 Current            1 Term 17 39w4d / 00:14 3.43 kg (7 lb 9 oz) M Vag-Vacuum EPI N CATERINA      Birth Comments: Called to delivery for fetal heart decelerations, meconium fluid. Infant cried spontaneously after delivery at [facility] chest, but was slightly stunned and became nonvigorous. Brought to warmer, stimulation provided which improved infants tone and cry. At approximately 7 minutes of age infant noted to be slightly grunty, continued to have grunting and retractions until about 9 minutes of age. CPAP started and infant placed on oximeter.  Oxygen saturations 88% and dropped to 78% after CPAP initiated. 50% oxgyen provided and weaned back to room air within 1 minute for improved saturations to 100%. Grunting and retractions continued on CPAP with no tachypnea. Infant brought to nursery for HFNC and observation. Parents updated on condition and plan for sepsis evaluation and respiratory support in nursery.      Name: maurizio      Apgar1: 9  Apgar5: 9       EDC: Estimated Date of Delivery: 20    Prenatal Labs:   Lab Results   Component Value Date    ABO A 10/28/2019    RH Pos 10/28/2019    AS Neg 10/28/2019    HEPBANG Nonreactive 10/28/2019    CHPCRT Negative 10/28/2019    GCPCRT Negative 10/28/2019    TREPAB Negative 2017    HGB 12.0 2020       GBS Status:   Lab Results   Component  Value Date    GBS Negative 05/13/2020       Active Problem List  Patient Active Problem List   Diagnosis     Exercise-induced asthma     Recurrent cold sores     Internal hemorrhoid     Recurrent genital HSV (herpes simplex virus) infection     Prenatal care, subsequent pregnancy     BERENICE II (cervical intraepithelial neoplasia II)     Elevated blood pressure affecting pregnancy in third trimester, antepartum     Prenatal care, subsequent pregnancy in third trimester       Medication Prior to Admission  Medications Prior to Admission   Medication Sig Dispense Refill Last Dose     Acetaminophen (TYLENOL PO)         docusate sodium (COLACE) 100 MG capsule Take 1 capsule (100 mg) by mouth 2 times daily as needed for constipation 90 capsule 1      famotidine (PEPCID) 20 MG tablet Take 1 tablet (20 mg) by mouth 2 times daily as needed (heartburn) (Patient not taking: Reported on 5/26/2020) 90 tablet 1      ferrous sulfate (FEROSUL) 325 (65 Fe) MG tablet Take 1 tablet (325 mg) by mouth daily (with breakfast) 90 tablet 1      Prenatal Vit-Fe Fumarate-FA (PRENATAL MULTIVITAMIN  PLUS IRON) 27-0.8 MG TABS Take 1 tablet by mouth daily        Pseudoephedrine HCl (SUDAFED PO)         valACYclovir (VALTREX) 1000 mg tablet Take 1 tablet (1,000 mg) by mouth daily (Patient not taking: Reported on 5/26/2020) 60 tablet 3      valACYclovir (VALTREX) 500 MG tablet Take 1 tablet (500 mg) by mouth daily 30 tablet 0    .        Maternal Past Medical History:     Past Medical History:   Diagnosis Date     Abnormal Pap smear of cervix 2006, 2009, 2010, 2011, 2012, 2013    see problem list     Cervical high risk HPV (human papillomavirus) test positive 2009, 2012, 2015, 2019    see problem list     Chickenpox      BERENICE III with severe dysplasia 2014    LEEP     Genital herpes      Hypertension     noted 2 days after delivery in 2017 lasting 2-3 days     Seizure (H)     x2 at age 20                       Family History:     Family History    Problem Relation Age of Onset     Hypertension Father      Asthma Father      Substance Abuse Maternal Grandmother         alcohol     Other Cancer Maternal Grandmother         skin and lung     Hypertension Maternal Grandfather      Cerebrovascular Disease Paternal Grandmother      Bleeding Disorder Paternal Grandfather      Family history reviewed and updated in [facility]            Social History:     Social History     Tobacco Use     Smoking status: Former Smoker     Packs/day: 0.10     Types: Cigarettes     Last attempt to quit: 2019     Years since quittin.9     Smokeless tobacco: Never Used   Substance Use Topics     Alcohol use: Yes     Alcohol/week: 0.0 standard drinks     Comment: occas-quit with pregnancy            Review of Systems:   The Review of Systems is negative other than noted in the HPI          Physical Exam:   Vitals were reviewed  Temp: 98.2  F (36.8  C) Temp src: Oral BP: 126/81 Pulse: 84   Resp: 16        Constitutional:   awake, alert, cooperative, no apparent distress, and appears stated age     Lungs:   No increased work of breathing, good air exchange, clear to auscultation bilaterally, no crackles or wheezing     Cardiovascular:   normal S1 and S2      Cervix:   Membranes: intact   Dilation: 2   Effacement: 60%   Station:-2   Consistency: soft   Position: Posterior  Presentation:Cephalic  Fetal Heart Rate Tracing: reactive and reassuring, Tier 1 (normal)  Tocometer: external monitor                       Assessment:   Gisele Meadows is a 39w0d pregnant female admitted with induction of labor, indication maternal request.          Plan:   Admit - see IP orders  Labor induction with Pitocin  Pain medication prn    Destiny Cates MD

## 2020-05-31 NOTE — PROGRESS NOTES
Called to see patient due to fetal heart rate deceleration.   Patient was feeling lightheaded and the FHT went down to the 70's x 5 min.  Patient placed in left lateral with IV bolus running. Pitocin discontinued and ephedrine given.     FHT improved and returned to baseline with 1 more deceleration.  FHT now stable with baseline of 145 and no further decelerations.      SVE 4-5/100/-1    Destiny Cates M.D.

## 2020-05-31 NOTE — ANESTHESIA PREPROCEDURE EVALUATION
Anesthesia Pre-Procedure Evaluation    Patient: Gisele Meadows   MRN: [id number] : 1985          Preoperative Diagnosis: * No pre-op diagnosis entered *    * No procedures listed *    Past Medical History:   Diagnosis Date     Abnormal Pap smear of cervix , , , , ,     see problem list     Cervical high risk HPV (human papillomavirus) test positive , , ,     see problem list     Chickenpox      BERENICE III with severe dysplasia     LEEP     Genital herpes      Hypertension     noted 2 days after delivery in 2017 lasting 2-3 days     Seizure (H)     x2 at age 20     Past Surgical History:   Procedure Laterality Date     LEEP TX, CERVICAL  14    BERENICE 1-3, Margins negative     MOUTH SURGERY      wisdom teeth     SURGICAL HISTORY OF -  Left     repair of cartialage in left hip       Anesthesia Evaluation       history and physical reviewed .      No history of anesthetic complications          ROS/MED HX    ENT/Pulmonary:     (+)asthma , . .    Neurologic: Comment: x2 at age 20    (+)seizures     Cardiovascular:  - neg cardiovascular ROS       METS/Exercise Tolerance:     Hematologic:         Musculoskeletal:         GI/Hepatic:     (+) GERD       Renal/Genitourinary:         Endo:         Psychiatric:         Infectious Disease:         Malignancy:         Other:                     neg OB ROS            Physical Exam  Normal systems: cardiovascular, pulmonary and dental    Airway   Mallampati: II  TM distance: > 3 FB  Neck ROM: full  Mouth opening: > 3 cm    Dental     Cardiovascular       Pulmonary     Other findings: obesity        Lab Results   Component Value Date    WBC 11.4 (H) 2020    HGB 12.0 2020    HCT 35.4 2020     2020     2017    POTASSIUM 4.3 2017    CHLORIDE 105 2017    CO2 26 2017    BUN 14 2017    CR 0.74 2020     (H) 2017    DEWAYNE 8.7 2017    ALBUMIN 2.5 (L)  "04/30/2020    PROTTOTAL 6.9 04/30/2020    ALT 27 05/26/2020    AST 20 05/26/2020    ALKPHOS 148 04/30/2020    BILITOTAL <0.1 (L) 04/30/2020    PTT 28 03/03/2020    INR 1.02 03/03/2020    FIBR 721 (H) 03/03/2020    HCG Negative 07/30/2019       Preop Vitals  BP Readings from Last 3 Encounters:   05/31/20 126/81   05/26/20 132/82   05/26/20 (!) 145/90    Pulse Readings from Last 3 Encounters:   05/31/20 84   05/26/20 86   05/26/20 106      Resp Readings from Last 3 Encounters:   05/31/20 16   05/26/20 16   05/26/20 18    SpO2 Readings from Last 3 Encounters:   11/13/19 98%   03/26/18 98%   06/11/17 98%      Temp Readings from Last 1 Encounters:   05/31/20 36.8  C (98.2  F) (Oral)    Ht Readings from Last 1 Encounters:   05/31/20 1.651 m (5' 5\")      Wt Readings from Last 1 Encounters:   05/31/20 94.8 kg (208 lb 14.4 oz)    Estimated body mass index is 34.76 kg/m  as calculated from the following:    Height as of this encounter: 1.651 m (5' 5\").    Weight as of this encounter: 94.8 kg (208 lb 14.4 oz).       Anesthesia Plan      History & Physical Review      ASA Status:  .  OB Epidural Asa: 3            Postoperative Care      Consents  Anesthetic plan, risks, benefits and alternatives discussed with:  Patient..                 IVET Saleem CRNA  "

## 2020-05-31 NOTE — L&D DELIVERY NOTE
"Delivery Summary    Gisele Meadows MRN# [id number]   Age: 35 year old YOB: 1985     ASSESSMENT & PLAN: 35 year old  presented @ 39w0d to [facility] for elective IOL.     Stage 1: initial exam /-2.  Pitocin induction.  AROM clear.  Epidural for analgesia.  Normal labor progress.  Deep deceleration after epidural dosed, but fetal heart tracing recovered.    Stage 2: pushed over 1 contraction.      of viable female, \"Cony\", 7#14, Apgars 8/9  Placenta with 3vc; body cord noted  Lacerations: 2-0 and 2-0 vicryl  Mother and infant stable.    Destiny Cates M.D.         Labor Event Times    Labor onset date:  20 Onset time:   1:00 PM   Dilation complete date:  20 Complete time:   4:05 PM   Start pushing date/time:  2020 1607      Labor Length    1st Stage (hrs):  3 (min):  5   2nd Stage (hrs):  0 (min):  4   3rd Stage (hrs):  0 (min):  3      Labor Events     labor?:  No   steroids:  None  Labor Type:  Induction/Cervical ripening, AROM  Predominate monitoring during 1st stage:  continuous electronic fetal monitoring     Rupture date/time: 20 1120   Rupture type:  Artificial Rupture of Membranes  Fluid color:  Clear     Induction:  Oxytocin  Induction date/time:     Cervical ripening date/time:     Indications for induction:  Elective     Delivery/Placenta Date and Time    Delivery Date:  20 Delivery Time:   4:09 PM   Placenta Date/Time:  2020  4:12 PM     Vaginal Counts     Initial count performed by 2 team members:   Two Team Members   SHIRLEY Garcia RN       [employer]   Initial counts 2 0 5 0   Added to count  2     Final counts 2 2 5    Placed during labor Accounted for at the end of labor   No NA   No NA   No NA    Final count performed by 2 team members:   Two Team Members   RAMIRO Ralph      Final count correct?:  Yes     Apgars    Living status:  Living   1 Minute 5 Minute 10 Minute 15 " "Minute 20 Minute   Skin color: 1  1       Heart rate: 2  2       Reflex irritability: 2  2       Muscle tone: 1  2       Respiratory effort: 2  2       Total: 8  9       Apgars assigned by:  MELANIE REMY     Cord    Vessels:  3 Vessels Complications:  None   Cord Blood Disposition:  Lab Gases Sent?:  No      Loraine Resuscitation    Methods:  None     Loraine Measurements    Weight:  7 lb 14 oz Length:  1' 8\"   Head circumference:  33 cm       Skin to Skin and Feeding Plan    Skin to skin initiation date/time: 1841    Skin to skin with:  Mother  Skin to skin end date/time:     Breastfeeding initiated date/time:  2020 1645  How do you plan to feed your baby:  Breastfeeding     Labor Events and Shoulder Dystocia    Fetal Tracing Prior to Delivery:  Category 2  Fetal Tracing Comments:  deep variables with contractions  Shoulder dystocia present?:  Neg     Delivery (Maternal) (Provider to Complete) ([id number])    Episiotomy:  None  Perineal lacerations:  2nd    Est. blood loss (mL):  50     Blood Loss  Mother: Gisele Meadows R #[id number]   Start of Mother's Information    IO Blood Loss  20 1300 - 20 0134    EBL (mL) Hospital Encounter 50 mL    Delivery QBL (mL) Hospital Encounter 50 mL    Postpartum QBL (mL) Hospital Encounter 68 mL    Total  168 mL         End of Mother's Information  Mother: Gisele Meadows R #[id number]         Delivery - Provider to Complete ([id number])    Delivering clinician:  Destiny Cates MD  Attempted Delivery Types (Choose all that apply):  Spontaneous Vaginal Delivery  Delivery Type (Choose the 1 that will go to the Birth History):  Vaginal, Spontaneous   Other personnel:   Provider Role   Emily Remy RN Delivery Nurse   Jackie Ralph RN Charge Nurse         Placenta    Delayed Cord Clamping:  Done  Date/Time:  2020  4:12 PM  Removal:  Spontaneous  Disposition:  Hospital disposal     Anesthesia    Method:  Epidural, Local  Cervical dilation at placement:  0-3    "       Presentation and Position    Presentation:  Jason Cates MD

## 2020-05-31 NOTE — ANESTHESIA PROCEDURE NOTES
Procedure note : epidural catheter  Staff -       CRNA: Charmaine Morillo APRN CRNA    Performed By: CRNA        Pre-Procedure    Location: OB    Procedure Times:5/31/2020 10:46 AM and 5/31/2020 11:10 AM  Pre-Anesthestic Checklist: patient identified, IV checked, risks and benefits discussed, informed consent, monitors and equipment checked, pre-op evaluation and at physician/surgeon's request    Timeout  Correct Patient: Yes   Correct Procedure: Yes   Correct Site: Yes   Correct Laterality: N/A   Correct Position: Yes   Site Marked: N/A   .   Procedure Documentation    Diagnosis:pain.    Procedure: epidural catheter, .   Patient Position:sitting Insertion Site:L2-3  (midline approach) Injection technique: LORT saline   Local skin infiltrated with 7 mL of 1% lidocaine.  ARI at 8 cm    Patient Prep/Sterile Barriers; mask, sterile gloves, patient draped.  .  Needle: Touhy needle   Needle Gauge: 17.    Needle Length (Inches) 3.5   # of attempts: 1 and # of redirects:  .    Catheter: 19 G . .  Catheter threaded easily  .  12 cm at skin.   .    Assessment/Narrative  Paresthesias: No.  .  .  Aspiration negative for heme or CSF  . Test dose of 3 mL lidocaine 1.5% w/ 1:200,000 epinephrine at 12:01.  Test dose negative for signs of intravascular, subdural or intrathecal injection. Comments:  1046-placement of epidural catheter-dosing TBD.    VAS pain score prior to epidural:5    VAS pain score after epidural:1    Pt. Tolerated well, FHR stable.

## 2020-05-31 NOTE — PROGRESS NOTES
"Starting to feel contractions more strongly    /81   Pulse 84   Temp 98.2  F (36.8  C) (Oral)   Resp 16   Ht 1.651 m (5' 5\")   Wt 94.8 kg (208 lb 14.4 oz)   LMP 09/01/2019   BMI 34.76 kg/m    TOCO: q 2-4 Pit @ 10  FHT: 140 + accel, moderate variability.  No decelerations  SVE: 2.5/70/-2/mid soft  AROM clear    A/P 39w0d induction    1. Pit per protocol  2. Epidural per anesthesia    Destiny Cates M.D.   "

## 2020-05-31 NOTE — PLAN OF CARE
This writer was called in to pt room. Patient  c/o lightheadedness and nausea, BP 94/48, ephedrine given IV to treat BP. Soon after after BP dropped FHR decreased to 70's for a total of 5 minutes.FHR returning to baseline after interventions of Pitocin being shut off, pt repositioned to R and fluid flush administered. Vicki Long charge RN, and this writer was at bedside during decel. SVE per Dr Cates 4-5/100/-1. Will continue to monitor

## 2020-06-01 VITALS
OXYGEN SATURATION: 96 % | RESPIRATION RATE: 16 BRPM | HEART RATE: 80 BPM | DIASTOLIC BLOOD PRESSURE: 73 MMHG | SYSTOLIC BLOOD PRESSURE: 149 MMHG | HEIGHT: 65 IN | BODY MASS INDEX: 33.72 KG/M2 | TEMPERATURE: 98 F | WEIGHT: 202.38 LBS

## 2020-06-01 LAB — T PALLIDUM AB SER QL: NONREACTIVE

## 2020-06-01 PROCEDURE — 25000132 ZZH RX MED GY IP 250 OP 250 PS 637: Performed by: OBSTETRICS & GYNECOLOGY

## 2020-06-01 RX ORDER — IBUPROFEN 200 MG
600 TABLET ORAL EVERY 6 HOURS PRN
COMMUNITY
Start: 2020-06-01 | End: 2020-10-19

## 2020-06-01 RX ADMIN — ACETAMINOPHEN 650 MG: 325 TABLET, FILM COATED ORAL at 14:56

## 2020-06-01 RX ADMIN — DOCUSATE SODIUM AND SENNOSIDES 1 TABLET: 8.6; 5 TABLET, FILM COATED ORAL at 09:07

## 2020-06-01 RX ADMIN — ACETAMINOPHEN 650 MG: 325 TABLET, FILM COATED ORAL at 09:07

## 2020-06-01 RX ADMIN — IBUPROFEN 800 MG: 800 TABLET ORAL at 18:12

## 2020-06-01 RX ADMIN — VALACYCLOVIR HYDROCHLORIDE 500 MG: 500 TABLET, FILM COATED ORAL at 09:06

## 2020-06-01 RX ADMIN — IBUPROFEN 800 MG: 800 TABLET ORAL at 06:02

## 2020-06-01 RX ADMIN — IBUPROFEN 800 MG: 800 TABLET ORAL at 12:18

## 2020-06-01 ASSESSMENT — MIFFLIN-ST. JEOR: SCORE: 1613.88

## 2020-06-01 NOTE — PLAN OF CARE
Pt up ambulating in labor room. Large void. Pt then transferred from 2053 to 2044 via ambulation. Patient and  oriented to room and call light.

## 2020-06-01 NOTE — PLAN OF CARE
VSS, BF going well, progressing along PP pathway as expected.  Orders are in to discharge to home.  Goals have been met.

## 2020-06-01 NOTE — PLAN OF CARE
Data: Vital signs within normal limits. Postpartum checks within normal limits - see flow record. Patient eating and drinking normally. Patient able to empty bladder independently. . Patient ambulating independently..   No apparent signs of infection. perineum healing well. Patient is performing self cares and is able to care for infant. Positive attachment behaviors are observed with infant. Support persons are present.  Action:  Pain plan was discussed. Patient will request pain med when she is ready for it.  Patient would like pain meds to be brought in when they are due. Patient was medicated during the shift for pain and cramping. See MAR.Patient education done about hand hygiene, breastfeeding,  cares, and postpartum cares. See flow record.  Response:   Patient reassessed within 1 hour after each medication for pain. Patient stated that pain had improved. Patient stated that she was comfortable. .   Plan: Anticipate discharge on 20.

## 2020-06-01 NOTE — PLAN OF CARE
Data: Vital signs within normal limits. Postpartum checks within normal limits - see flow record. Patient eating and drinking normally. Patient able to empty bladder independently. . Patient ambulating independently..   No apparent signs of infection. Lac 2nd degree healing well. Patient is performing self cares and is able to care for infant. Positive attachment behaviors are observed with infant. Support persons are present.  Action:  Pain plan was discussed. Patient will request pain med when she is ready for it. Patient was medicated during the shift for cramping. See MAR.Patient education done about breastfeeding and pain management/plan. See flow record.  Response:   Patient reassessed within 1 hour after each medication for pain. Patient stated that pain had improved. Patient stated that she was comfortable. .   Plan: Anticipate discharge on 6/1  .

## 2020-06-01 NOTE — DISCHARGE SUMMARY
[facility] Discharge Summary    Gisele Meadows MRN# [id number]   Age: 35 year old YOB: 1985     Date of Admission:  5/31/2020  Date of Discharge::  6/1/2020  Admitting Physician:  Destiny Cates MD  Discharge Physician:  Mehrdad Valenzuela MD     Home clinic: [facility]          Admission Diagnoses:   Prenatal care, subsequent pregnancy in third trimester  Vaginal delivery          Discharge Diagnosis:   Normal spontaneous vaginal delivery  Intrauterine pregnancy at 39 weeks gestation          Procedures:   Procedure(s): Repair of second degree perineal laceration       No other procedures performed during this admission           Medications Prior to Admission:     Medications Prior to Admission   Medication Sig Dispense Refill Last Dose     docusate sodium (COLACE) 100 MG capsule Take 1 capsule (100 mg) by mouth 2 times daily as needed for constipation 90 capsule 1 5/31/2020 at 0800     famotidine (PEPCID) 20 MG tablet Take 1 tablet (20 mg) by mouth 2 times daily as needed (heartburn) 90 tablet 1 Past Week at Unknown time     ferrous sulfate (FEROSUL) 325 (65 Fe) MG tablet Take 1 tablet (325 mg) by mouth daily (with breakfast) 90 tablet 1 5/31/2020 at 0800     Prenatal Vit-Fe Fumarate-FA (PRENATAL MULTIVITAMIN  PLUS IRON) 27-0.8 MG TABS Take 1 tablet by mouth daily   5/30/2020 at 2000     Pseudoephedrine HCl (SUDAFED PO)    More than a month at Unknown time     [DISCONTINUED] Acetaminophen (TYLENOL PO)    5/30/2020 at Unknown time     [DISCONTINUED] valACYclovir (VALTREX) 1000 mg tablet Take 1 tablet (1,000 mg) by mouth daily (Patient not taking: Reported on 5/26/2020) 60 tablet 3      [DISCONTINUED] valACYclovir (VALTREX) 500 MG tablet Take 1 tablet (500 mg) by mouth daily 30 tablet 0 5/30/2020 at 2000             Discharge Medications:     Current Discharge Medication List      START taking these medications    Details   ibuprofen (ADVIL/MOTRIN) 200 MG tablet Take 3  "tablets (600 mg) by mouth every 6 hours as needed for mild pain    Associated Diagnoses: Vaginal delivery         CONTINUE these medications which have NOT CHANGED    Details   docusate sodium (COLACE) 100 MG capsule Take 1 capsule (100 mg) by mouth 2 times daily as needed for constipation  Qty: 90 capsule, Refills: 1    Associated Diagnoses: Prenatal care, subsequent pregnancy in third trimester      famotidine (PEPCID) 20 MG tablet Take 1 tablet (20 mg) by mouth 2 times daily as needed (heartburn)  Qty: 90 tablet, Refills: 1    Associated Diagnoses: Prenatal care, subsequent pregnancy in third trimester      ferrous sulfate (FEROSUL) 325 (65 Fe) MG tablet Take 1 tablet (325 mg) by mouth daily (with breakfast)  Qty: 90 tablet, Refills: 1    Associated Diagnoses: Prenatal care, subsequent pregnancy in third trimester      Prenatal Vit-Fe Fumarate-FA (PRENATAL MULTIVITAMIN  PLUS IRON) 27-0.8 MG TABS Take 1 tablet by mouth daily      Pseudoephedrine HCl (SUDAFED PO)          STOP taking these medications       Acetaminophen (TYLENOL PO) Comments:   Reason for Stopping:         valACYclovir (VALTREX) 1000 mg tablet Comments:   Reason for Stopping:         valACYclovir (VALTREX) 500 MG tablet Comments:   Reason for Stopping:                     Consultations:   No consultations were requested during this admission          Brief History of Labor:      Gisele Meadows MRN# [id number]   Age: 35 year old YOB: 1985      ASSESSMENT & PLAN: 35 year old  presented @ 39w0d to [facility] for elective IOL.      Stage 1: initial exam 260/-2.  Pitocin induction.  AROM clear.  Epidural for analgesia.  Normal labor progress.  Deep deceleration after epidural dosed, but fetal heart tracing recovered.    Stage 2: pushed over 1 contraction.       of viable female, \"Cony\", 7#14, Apgars 8/9  Placenta with 3vc; body cord noted  Lacerations: 2-0 and 2-0 vicryl  Mother and infant stable.                Hospital Course: " "  The patient's hospital course was unremarkable.  On discharge, her pain was well controlled. Vaginal bleeding is similar to peak menstrual flow.  Voiding without difficulty.  Ambulating well and tolerating a normal diet.  No fever.  Breastfeeding well.  Infant is stable.  No bowel movement yet.*  She was discharged on post-partum day #1.    BP (!) 141/74   Pulse 82   Temp 98.5  F (36.9  C) (Oral)   Resp 16   Ht 1.651 m (5' 5\")   Wt 94.8 kg (208 lb 14.4 oz)   LMP 09/01/2019   SpO2 100%   Breastfeeding Unknown   BMI 34.76 kg/m    Exam:  Constitutional: healthy, alert and no distress  Head: Normocephalic. No masses, lesions, tenderness or abnormalities    Cardiovascular: negative  Respiratory: negative  Gastrointestinal: Abdomen soft, non-tender. BS normal. No masses, organomegaly FF@U  : Deferred  Musculoskeletal: extremities normal- no gross deformities noted, gait normal and normal muscle tone  Skin: no suspicious lesions or rashes  Neurologic: Gait normal. Reflexes normal and symmetric. Sensation grossly WNL.  Psychiatric: mentation appears normal and affect normal/bright      Post-partum hemoglobin:   Hemoglobin   Date Value Ref Range Status   05/31/2020 12.0 11.7 - 15.7 g/dL Final             Discharge Instructions and Follow-Up:   Discharge diet: Regular   Discharge activity: Pelvic rest: abstain from intercourse and do not use tampons for 6 week(s)   Discharge follow-up: Follow up with   in 6 weeks   Wound care: Drink plenty of fluids  Ice to area for comfort  Keep wound clean and dry           Discharge Disposition:   Discharged to home      Attestation:  I have reviewed today's vital signs, notes, medications, labs and imaging.  Amount of time performed on this discharge summary: 7 minutes.    Mehrdad Valenzuela MD     "

## 2020-06-01 NOTE — ANESTHESIA POSTPROCEDURE EVALUATION
Patient: Gisele Meadows    * No procedures listed *    Diagnosis:* No pre-op diagnosis entered *  Diagnosis Additional Information: No value filed.    Anesthesia Type:  No value filed.    Note:  Anesthesia Post Evaluation    Patient location during evaluation: Floor  Patient participation: Able to fully participate in evaluation  Level of consciousness: awake and alert  Pain management: adequate  Airway patency: patent  Cardiovascular status: acceptable and hemodynamically stable  Respiratory status: acceptable, room air and spontaneous ventilation  Hydration status: acceptable  PONV: none     Anesthetic complications: None          Last vitals:  Vitals:    05/31/20 1730 05/31/20 1745 05/31/20 1800   BP: 137/75 (!) 146/83 (!) 147/85   Pulse:      Resp:      Temp:      SpO2: 99% 100% 100%         Electronically Signed By: IVET Saleem CRNA  May 31, 2020  8:56 PM

## 2020-06-01 NOTE — PROGRESS NOTES
[facility] DISCHARGE NOTE    Patient discharged to home at 6:35 PM via ambulation. Accompanied by spouse and staff. Discharge instructions reviewed with patient and spouse, opportunity offered to ask questions. Prescriptions - None ordered for discharge. All belongings sent with patient.    Yen Ford RN

## 2020-06-01 NOTE — ANESTHESIA CARE TRANSFER NOTE
Patient: Gisele Meadows    * No procedures listed *    Diagnosis: * No pre-op diagnosis entered *  Diagnosis Additional Information: No value filed.    Anesthesia Type:   No value filed.     Note:  Airway :Room Air  Patient transferred to:Labor and Delivery  Handoff Report: Identifed the Patient, Identified the Reponsible Provider, Reviewed the pertinent medical history, Discussed the surgical course, Reviewed Intra-OP anesthesia mangement and issues during anesthesia, Set expectations for post-procedure period and Allowed opportunity for questions and acknowledgement of understanding      Vitals: (Last set prior to Anesthesia Care Transfer)              Electronically Signed By: IVET Saleem CRNA  May 31, 2020  8:56 PM

## 2020-06-10 ENCOUNTER — MYC MEDICAL ADVICE (OUTPATIENT)
Dept: OBGYN | Facility: CLINIC | Age: 35
End: 2020-06-10

## 2020-06-10 NOTE — TELEPHONE ENCOUNTER
Prescription printed and given to Dr. Cates to sign. Will bring to [facility] downstairs when complete.    Mandi Deng   [facility]  RN

## 2020-07-14 ENCOUNTER — PRENATAL OFFICE VISIT (OUTPATIENT)
Dept: OBGYN | Facility: CLINIC | Age: 35
End: 2020-07-14
Payer: COMMERCIAL

## 2020-07-14 VITALS
SYSTOLIC BLOOD PRESSURE: 133 MMHG | BODY MASS INDEX: 30.56 KG/M2 | DIASTOLIC BLOOD PRESSURE: 85 MMHG | HEART RATE: 77 BPM | HEIGHT: 65 IN | WEIGHT: 183.4 LBS | RESPIRATION RATE: 16 BRPM

## 2020-07-14 DIAGNOSIS — N87.1 CIN II (CERVICAL INTRAEPITHELIAL NEOPLASIA II): ICD-10-CM

## 2020-07-14 DIAGNOSIS — G56.03 BILATERAL CARPAL TUNNEL SYNDROME: ICD-10-CM

## 2020-07-14 DIAGNOSIS — Z30.014 ENCOUNTER FOR INITIAL PRESCRIPTION OF INTRAUTERINE CONTRACEPTIVE DEVICE (IUD): ICD-10-CM

## 2020-07-14 PROBLEM — Z34.80 PRENATAL CARE, SUBSEQUENT PREGNANCY: Status: RESOLVED | Noted: 2019-09-25 | Resolved: 2020-07-14

## 2020-07-14 PROBLEM — Z30.430 ENCOUNTER FOR IUD INSERTION: Status: ACTIVE | Noted: 2020-07-14

## 2020-07-14 PROCEDURE — 88175 CYTOPATH C/V AUTO FLUID REDO: CPT | Performed by: OBSTETRICS & GYNECOLOGY

## 2020-07-14 PROCEDURE — 99207 ZZC POST PARTUM EXAM: CPT | Performed by: OBSTETRICS & GYNECOLOGY

## 2020-07-14 PROCEDURE — 58300 INSERT INTRAUTERINE DEVICE: CPT | Performed by: OBSTETRICS & GYNECOLOGY

## 2020-07-14 PROCEDURE — 87624 HPV HI-RISK TYP POOLED RSLT: CPT | Performed by: OBSTETRICS & GYNECOLOGY

## 2020-07-14 ASSESSMENT — PATIENT HEALTH QUESTIONNAIRE - PHQ9
SUM OF ALL RESPONSES TO PHQ QUESTIONS 1-9: 0
5. POOR APPETITE OR OVEREATING: NOT AT ALL

## 2020-07-14 ASSESSMENT — ANXIETY QUESTIONNAIRES
2. NOT BEING ABLE TO STOP OR CONTROL WORRYING: NOT AT ALL
GAD7 TOTAL SCORE: 0
3. WORRYING TOO MUCH ABOUT DIFFERENT THINGS: NOT AT ALL
6. BECOMING EASILY ANNOYED OR IRRITABLE: NOT AT ALL
IF YOU CHECKED OFF ANY PROBLEMS ON THIS QUESTIONNAIRE, HOW DIFFICULT HAVE THESE PROBLEMS MADE IT FOR YOU TO DO YOUR WORK, TAKE CARE OF THINGS AT HOME, OR GET ALONG WITH OTHER PEOPLE: NOT DIFFICULT AT ALL
7. FEELING AFRAID AS IF SOMETHING AWFUL MIGHT HAPPEN: NOT AT ALL
1. FEELING NERVOUS, ANXIOUS, OR ON EDGE: NOT AT ALL
5. BEING SO RESTLESS THAT IT IS HARD TO SIT STILL: NOT AT ALL

## 2020-07-14 ASSESSMENT — MIFFLIN-ST. JEOR: SCORE: 1527.78

## 2020-07-14 NOTE — PROGRESS NOTES
"SUBJECTIVE:  Gisele Meadows is a 35 year old female  here for a postpartum visit.  She had a  on 2020 delivering a healthy baby girl weighing 7 lbs 14 oz at term.      delivery complications:  No  breast feeding:  Yes,   bladder problems:  No  bowel problems/hemorrhoids:  No  episiotomy/laceration/incision healed? Yes:   She is had persistent bilateral carpal tunnel symptoms despite the use of braces and having delivered.  vaginal flow:  None  [place]:  No  contraception:  IUD  They are considering a vasectomy  emotional adjustment:  doing well and happy  back to work:  In the fall    OBJECTIVE:  Blood pressure 133/85, pulse 77, resp. rate 16, height 1.651 m (5' 5\"), weight 83.2 kg (183 lb 6.4 oz), last menstrual period 2019, currently breastfeeding.   General - pleasant female in no acute distress.  Breast - no nodularity, asymmetry or nipple discharge bilaterally.  Abdomen - soft, nontender, nondistended, no hepatosplenomegaly.  Pelvic - EG: normal adult female, BUS: within normal limits, Vagina: well rugated, no discharge, Cervix: no lesions or CMT, Uterus: firm, normal sized and nontender, Adnexae: no masses or tenderness.  Rectovaginal - deferred.  CC:  IUD insertion  HPI:  Gisele Meadows is a 35 year old female Patient's last menstrual period was 2019.  Menses are rare,Breast feeding No other c/o.  She is here for an IUD insertion. Patient has verbalized understanding of risks and benefits and has signed the consent form.    Past GYN history:  No STD history       Last PAP smear:  Normal  Manogomous relationship? Yes    The patient's past medical history, social history and family history is reviewed at our visit and updated in EPIC.    Allergies: Sulfa drugs    Current Outpatient Medications   Medication Sig Dispense Refill     docusate sodium (COLACE) 100 MG capsule Take 1 capsule (100 mg) by mouth 2 times daily as needed for constipation (Patient not taking: Reported on " "7/14/2020) 90 capsule 1     famotidine (PEPCID) 20 MG tablet Take 1 tablet (20 mg) by mouth 2 times daily as needed (heartburn) (Patient not taking: Reported on 7/14/2020) 90 tablet 1     ferrous sulfate (FEROSUL) 325 (65 Fe) MG tablet Take 1 tablet (325 mg) by mouth daily (with breakfast) (Patient not taking: Reported on 7/14/2020) 90 tablet 1     ibuprofen (ADVIL/MOTRIN) 200 MG tablet Take 3 tablets (600 mg) by mouth every 6 hours as needed for mild pain (Patient not taking: Reported on 7/14/2020)       Prenatal Vit-Fe Fumarate-FA (PRENATAL MULTIVITAMIN  PLUS IRON) 27-0.8 MG TABS Take 1 tablet by mouth daily       Pseudoephedrine HCl (SUDAFED PO)            ROS:  C: NEGATIVE for fever, chills, change in weight  R: NEGATIVE for significant cough or SOB  CV: NEGATIVE for chest pain, palpitations or peripheral edema  GI: NEGATIVE for nausea, abdominal pain, heartburn, or change in bowel habits  : NEGATIVE for frequency, dysuria, hematuria, vaginal discharge, or irregular bleeding      EXAM:  Blood pressure 133/85, pulse 77, resp. rate 16, height 1.651 m (5' 5\"), weight 83.2 kg (183 lb 6.4 oz), last menstrual period 09/01/2019, currently breastfeeding.  General - pleasant female in no acute distress.  Pelvic - EG: normal adult female, BUS: within normal limits, Vagina: well rugated, no discharge, Cervix: no lesions or CMT, Uterus: firm, normal sized and nontender, Adnexae: no masses or tenderness.    PROCEDURE:  After informed consent was obtained from the patient, a speculum was placed in the vagina to visualized the cervix.  The cervix was then swabbed with a betadine prep and 1% lidocaine paracervical block applied.  Tenaculum was placed at the 12 o'clock position on the cervix and the uterus sounded to 8.5cm.  The Mirena  IUD was then placed in the usual fashion under sterile technique.  Strings were clipped about 2 cm from the cervical os.  Tenaculum was removed and cervix was hemostatic. There were no " complications. The patient tolerated the procedure well.    lot # [id number] , exp. jUNE 2022    NDC#:68594-393-55 (MIRENA)      ASSESSMENT/PLAN:  (Z39.2) Routine postpartum follow-up  (primary encounter diagnosis)  Comment:  NORMAL EXAM  Plan: RESUME ACTIVITIES    (N87.1) BERENICE II (cervical intraepithelial neoplasia II)  Comment:   Plan: Pap imaged thin layer diagnostic with HPV         (select HPV order below), HPV High Risk Types         DNA Cervical            (G56.03) Bilateral carpal tunnel syndrome  Comment:   Plan: PHYSICAL THERAPY REFERRAL        Occupational Therapy referral with hand specialist    (Z30.014) Encounter for initial prescription of intrauterine contraceptive device (IUD)  Comment:   Plan: levonorgestrel (MIRENA) 20 MCG/24HR IUD 20 mcg,        ibuprofen (ADVIL/MOTRIN) tablet 600 mg                The patient should feel for the IUD strings after her next menses.  If unable to locate them, she should return to clinic for a speculum examination for confirmation that the IUD is in place. Bleeding pattern of this particular IUD was discussed with the patient. She is aware that the IUD will need to be removed in 5 years or PRN.  She is to return to clinic for her next annual or PRN.      Mehrdad Valenzuela MD      ASSESSMENT:  normal postpartum exam    PLAN:  Discussed kegel exercises   May resume normal activities without restrictions  Pap smear was  done today    The patient will use IUD for birth control. Full counseling was provided, and all questions answered. Compliance is strongly emphasized.  Return to clinic in one year for an annual, when due for a pap smear or PRN.    Mehrdad Valenzuela MD

## 2020-07-14 NOTE — LETTER
August 28, 2020    Gisele LILLIAN [place]  [address] [facility] [address]    Dear ,  This letter is regarding your recent Pap smear (cervical cancer screening) and Human Papillomavirus (HPV) test.  We are happy to inform you that your Pap smear result is normal. Cervical cancer is closely linked with certain types of HPV. Your results showed no evidence of high-risk HPV.  We recommend you have your next PAP smear and HPV test in 1 year.  You will still need to return to the clinic every year for an annual exam and other preventive tests.  If you have additional questions regarding this result, please call our registered nurse, Jackie at [phone].  Sincerely,    Mehrdad Valenzuela MD/eva

## 2020-07-14 NOTE — PROGRESS NOTES
Clinic Administered Medication Documentation    Oral Medication Documentation    Patient was given Ibuprofen . Prior to medication administration, verified patients identity using patient s name and date of birth. Please see MAR and medication order for additional information.     Was entire amount of medication used? Yes  Expiration Date: 02/2022

## 2020-07-15 ASSESSMENT — ANXIETY QUESTIONNAIRES: GAD7 TOTAL SCORE: 0

## 2020-07-16 LAB
COPATH REPORT: NORMAL
PAP: NORMAL

## 2020-07-20 LAB
FINAL DIAGNOSIS: NORMAL
HPV HR 12 DNA CVX QL NAA+PROBE: NEGATIVE
HPV16 DNA SPEC QL NAA+PROBE: NEGATIVE
HPV18 DNA SPEC QL NAA+PROBE: NEGATIVE
SPECIMEN DESCRIPTION: NORMAL
SPECIMEN SOURCE CVX/VAG CYTO: NORMAL

## 2020-07-21 ENCOUNTER — THERAPY VISIT (OUTPATIENT)
Dept: OCCUPATIONAL THERAPY | Facility: CLINIC | Age: 35
End: 2020-07-21
Payer: COMMERCIAL

## 2020-07-21 ENCOUNTER — PATIENT OUTREACH (OUTPATIENT)
Dept: OBGYN | Facility: CLINIC | Age: 35
End: 2020-07-21

## 2020-07-21 DIAGNOSIS — M79.642 BILATERAL HAND PAIN: Primary | ICD-10-CM

## 2020-07-21 DIAGNOSIS — D06.9 CIN III WITH SEVERE DYSPLASIA: ICD-10-CM

## 2020-07-21 DIAGNOSIS — G56.03 BILATERAL CARPAL TUNNEL SYNDROME: ICD-10-CM

## 2020-07-21 DIAGNOSIS — M79.641 BILATERAL HAND PAIN: Primary | ICD-10-CM

## 2020-07-21 PROCEDURE — 97112 NEUROMUSCULAR REEDUCATION: CPT | Mod: GO | Performed by: OCCUPATIONAL THERAPIST

## 2020-07-21 PROCEDURE — 97165 OT EVAL LOW COMPLEX 30 MIN: CPT | Mod: GO | Performed by: OCCUPATIONAL THERAPIST

## 2020-07-21 PROCEDURE — 97110 THERAPEUTIC EXERCISES: CPT | Mod: GO | Performed by: OCCUPATIONAL THERAPIST

## 2020-07-21 NOTE — PROGRESS NOTES
Hand Therapy Initial Evaluation    Current Date:  7/21/2020    Subjective:  Gisele Meadows is a 35 year old right hand dominant female.    Diagnosis:   Bilateral CTS (right > left)  DOI:  7/14/2020 (therapy referral)    Patient reports symptoms of pain, stiffness/loss of motion, weakness/loss of strength, edema, numbness and tingling of bilateral hands which occurred due to gradual onset during pregnancy starting at 34 weeks in April 2020, delivered baby 5/31/2020 and symptoms have improved but not resolved. Since onset symptoms are gradually getting better.  Special tests:  none.  Previous treatment: wrist splints for sleeping.  General health as reported by patient is good.  Pertinent medical history includes:Anemia, Asthma, Migraines/Headaches, Seizures  Medical allergies:sulfa.  Surgical history: orthopedic: left labrum repair.  Medication history: None.    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, off for summer and maternity leave  Job Tasks: Computer Work, Lifting, Carrying  Prior functional level:  independent-shared household chores  Barriers include:none  Mobility: No difficulty  Transportation: drives  Leisure activities/hobbies: 3 year old and infant     Functional Outcome Measure:  Upper Extremity Functional Index  SCORE:   Column Totals: 60/80  (A lower score indicates greater disability.)    Objective:  Pain Level (Scale 0-10)   7/21/2020   At Rest 3 right  0 left   With Use 7 right  5 left     Pain Description  Date 7/21/2020   Location hand   Pain Quality Aching and stiffness   Frequency intermittent     Pain is worst  nighttime and evening   Exacerbated by  use of hands   Relieved by stretch   Progression Gradually improving     Posture  Forward Neck Posture and Rounded Forward Shoulders    Edema  Reported swelling during pregnancy which has resolved since delivery    Sensation  Decreased Median Nerve  distribution per pt report    ROM   WNL hands and wrist. No thenar or intrinsic atrophy noted on exam.    Cervical Screen  Pain Report:  - none    + mild    ++ moderate    +++ severe      Active with Gentle Overpressure 7/21/2020   Flexion -   Extension -   Lateral Flexion Right -   Lateral Flexion Left -   Rotation Right -   Rotation Left -   Compression -   Traction -     Special Tests   - none    + mild    ++ moderate    +++ severe         7/21/2020   Yusuf Test R:+ slight  L:+ slight   Elbow Flexion Test R:+ med n  L:+ med n   Tinels Cubital Tunnel R:-  L:-   Tinels [place] R:-  L:-   Median Nerve Compression at Pronator R:-  L:-   Carpal-Compression Test R:+ slight  L:-   Ramos test for lumbrical incursion  (fist x 30 secs) R:-  L:-   Tinels at Carpal Tunnel R:+++  L:++   Phalens R:+ 20 secs  L:+ 20 secs   ULTT Median Nerve R:+ 4+/5  L:+ 4+/5     Strength   (Measured in pounds)  Pain Report: - none  + mild    ++ moderate    +++ severe    7/21/2020 7/21/2020   Trials Right Left   1  2  3 59+  47+  45+ 53-  43-  44-   Average 50 47     Lat Pinch 7/21/2020 7/21/2020   Trials Right Left   1  2  3 14+  15+  14+ 17-  17-  17-   Average 15 17     3 Pt Pinch 7/21/2020 7/21/2020   Trials Right Left   1  2  3 13+  14+  14+ 17-  17-  17-   Average 14 17     Palpation  Pain Report:  - none    + mild    ++ moderate    +++ severe    7/21/2020   MEP R:-  L:-   Flexors R:-  L:-   Volar wrist R:+  L:-   Thenars R:-  L:-   LEP R:-  L:-   Extensors R:-  L:-     Assessment:  Patient presents with symptoms consistent with diagnosis of bilateral CTS, with conservative intervention.     Patient's limitations or Problem List includes:  Pain, Weakness, Sensory disturbance, Decreased , Decreased pinch and Tightness in musculature of bilateral hands which interferes with the patient's ability to perform Self Care Tasks (dressing), Work Tasks, Recreational Activities and Household Chores as compared to previous level of  function.    Rehab Potential:  Excellent - Return to full activity, no limitations    Patient will benefit from skilled Occupational Therapy to increase flexibility, overall strength,  strength, pinch strength and sensation and decrease pain to return to previous activity level and resume normal daily tasks and to reach their rehab potential.    Barriers to Learning:  No barrier    Communication Issues:  Patient appears to be able to clearly communicate and understand verbal and written communication and follow directions correctly.    Chart Review: Chart Review and Simple history review with patient    Identified Performance Deficits: dressing, child rearing, home establishment and management, meal preparation and cleanup, work and leisure activities    Assessment of Occupational Performance:  5 or more Performance Deficits    Clinical Decision Making (Complexity): Low complexity    Treatment Explanation:  The following has been discussed with the patient:  RX ordered/plan of care  Anticipated outcomes  Possible risks and side effects    Plan:  Frequency:  2 X a month, once daily  Duration:  for 2 months    Treatment Plan:    Modalities:    US   Therapeutic Exercise:    AROM, PROM, Tendon Gliding and Isotonics  Neuromuscular re-ed:   Nerve Gliding, Posture and Kinesiotaping  Manual Techniques:   Friction massage and Myofascial release  Orthotic Fabrication:    Forearm based orthoses  Self Care:    Self Care Tasks and Ergonomic Considerations    Discharge Plan:  Achieve all LTG.  Independent in home treatment program.  Reach maximal therapeutic benefit.    Home Exercise Program:  CTS prevention, avoid prolonged wrist flexion   Tendon gliding with 3 fists and FDS  Forearm flexor stretche  Partial proximal median nerve gliding   Wear OTC wrist splints sleeping    Next Visit:  See in 2 weeks for virtual visit  Assess response to HEP  Progress to postural exercises, pec stretches  Add distal median nerve gliding and  lumbrical stretches as indicated

## 2020-07-21 NOTE — TELEPHONE ENCOUNTER
7/14/20 NIL Pap, Neg HPV. Plan cotest in 1 year.   7/21/20 MyChart result note sent    Jackie Pearl, RN  Pap Tracking     2005 NIL Pap  2006 [facility] Pap, Neg HPV.  2007 NIL Pap  2008 NIL Pap  6/16/09 [facility] Pap, + HR HPV  7/6/09 [place] Bx & ECC - Negative  8/3/10 [facility] Pap  10/3/11 [facility] Pap  10/23/12 [facility] Pap, + HR HPV.  11/7/12 [place] [facility] - BERENICE 1, ECC - Negative  12/30/13 [facility] Pap, can't exclude HSIL.   1/17/2014 [place]: BERENICE II, ECC BERENICE II  9/5/2014: [facility] - BERENICE 1, 2, 3. Negative margins. (Juda)  10/22/15 NIL Pap, + HR HPV  8/9/16 [place] - Negative. NIL Pap, Neg HPV.  All above from Care Everywhere  6/27/19 NIL Pap, + HR HPV (Neg 16/18). Plan colp.   7/31/19 [place] ECC - Negative. Plan cotest in 1 year.   7/14/20 NIL Pap, Neg HPV. Plan cotest in 1 year.   7/21/20 MyChart result note sent

## 2020-08-04 ENCOUNTER — THERAPY VISIT (OUTPATIENT)
Dept: OCCUPATIONAL THERAPY | Facility: CLINIC | Age: 35
End: 2020-08-04
Payer: COMMERCIAL

## 2020-08-04 DIAGNOSIS — G56.03 BILATERAL CARPAL TUNNEL SYNDROME: ICD-10-CM

## 2020-08-04 DIAGNOSIS — M79.641 BILATERAL HAND PAIN: ICD-10-CM

## 2020-08-04 DIAGNOSIS — M79.642 BILATERAL HAND PAIN: ICD-10-CM

## 2020-08-04 PROCEDURE — 97112 NEUROMUSCULAR REEDUCATION: CPT | Mod: GT | Performed by: OCCUPATIONAL THERAPIST

## 2020-08-04 PROCEDURE — 97110 THERAPEUTIC EXERCISES: CPT | Mod: GT | Performed by: OCCUPATIONAL THERAPIST

## 2020-08-04 NOTE — PROGRESS NOTES
SOAP note objective information for 8/4/2020:    Diagnosis:   Bilateral CTS (right > left)  DOI:  7/14/2020 (therapy referral)    Pain Level (Scale 0-10)   7/21/2020 8/4/2020   At Rest 3 right  0 left    With Use 7 right  5 left 3-4 right  0 left     Home Exercise Program:  CTS prevention, avoid prolonged wrist flexion   Tendon gliding with 3 fists and FDS  Forearm flexor stretche  Partial proximal median nerve gliding, full glide as salena  Distal median nerve gliding  Pec stretch in doorway  Postural exercises with chin tucks and scapular retraction    Wear OTC wrist splints sleeping    Next Visit:  See in 2 weeks for in clinic visit  Assess response to distal median nerve gliding, postural exercises and pec stretches  Add lumbrical stretches as indicated   Consider discharge to [facility] if doing well    Please refer to the daily flowsheet for treatment today, total treatment time and time spent performing 1:1 timed codes.

## 2020-08-18 ENCOUNTER — THERAPY VISIT (OUTPATIENT)
Dept: OCCUPATIONAL THERAPY | Facility: CLINIC | Age: 35
End: 2020-08-18
Payer: COMMERCIAL

## 2020-08-18 DIAGNOSIS — M79.641 BILATERAL HAND PAIN: ICD-10-CM

## 2020-08-18 DIAGNOSIS — G56.03 BILATERAL CARPAL TUNNEL SYNDROME: ICD-10-CM

## 2020-08-18 DIAGNOSIS — M79.642 BILATERAL HAND PAIN: ICD-10-CM

## 2020-08-18 PROCEDURE — 97112 NEUROMUSCULAR REEDUCATION: CPT | Mod: GO | Performed by: OCCUPATIONAL THERAPIST

## 2020-08-18 PROCEDURE — 97110 THERAPEUTIC EXERCISES: CPT | Mod: GO | Performed by: OCCUPATIONAL THERAPIST

## 2020-08-18 NOTE — PROGRESS NOTES
Hand Therapy Progress/Discharge Note    Current Date:  8/18/2020    Reporting period is 7/21/2020 to 8/18/2020    Diagnosis:   Bilateral CTS (right > left)  DOI:  7/14/2020 (therapy referral)    Subjective:   Subjective changes noted by patient:  The left seems almost 100%.  The right is less painful but still some numbness.  Functional changes noted by patient:  Improvement in Household Chores  Patient has noted adverse reaction to:  None    Functional Outcome Measure:  Upper Extremity Functional Index  SCORE:   Column Totals: 79/80  (A lower score indicates greater disability.)    Objective:  Pain Level (Scale 0-10)   7/21/2020 8/18/2020   At Rest 3 right  0 left 0 right  0 left   With Use 7 right  5 left 0 right  0 left     Pain Description  Date 7/21/2020 8/18/2020   Location hands Right hand   Pain Quality Aching and stiffness Stiffness, no pain   Frequency intermittent   intermittent   Pain is worst  nighttime and evening mornings   Exacerbated by  use of hands Inactivity   Relieved by stretch stretching   Progression Gradually improving Improved     Posture  Forward Neck Posture and Rounded Forward Shoulders    Edema  Reported swelling during pregnancy which has resolved since delivery    Sensation  Improved overall but still decreased in right Median Nerve distribution per pt report    ROM   WNL hands and wrist. No thenar or intrinsic atrophy noted on exam.    Cervical Screen  Pain Report:  - none    + mild    ++ moderate    +++ severe      Active with Gentle Overpressure 7/21/2020   Flexion -   Extension -   Lateral Flexion Right -   Lateral Flexion Left -   Rotation Right -   Rotation Left -   Compression -   Traction -     Special Tests   - none    + mild    ++ moderate    +++ severe         7/21/2020 8/18/2020   Yusuf Test R:+ slight  L:+ slight R:-  L:-   Elbow Flexion Test R:+ med n  L:+ med n R:-  L:-   Tinels Cubital Tunnel R:-  L:- R:-  L:+ slight   Tinels [place] R:-  L:- R:-  L:-   Median  Nerve Compression at Pronator R:-  L:- R:-  L:-   Carpal-Compression Test R:+ slight  L:- R:-  L:-   Ramos test for lumbrical incursion  (fist x 30 secs) R:-  L:- R:-  L:-   Tinels at Carpal Tunnel R:+++  L:++ R:+  L:+ slight   Phalens R:+ 20 secs  L:+ 20 secs R:+ slight at 60 secs  L:-   ULTT Median Nerve R:+ 4+/5  L:+ 4+/5 R:+ slight 5/5  L:- 5/5     Strength   (Measured in pounds)  Pain Report: - none  + mild    ++ moderate    +++ severe    7/21/2020 7/21/2020 8/18/2020 8/18/2020   Trials Right Left Right Left   1  2  3 59+  47+  45+ 53-  43-  44- 73-  74-  72- 83-  76-  72-   Average 50 47 73 77     Lat Pinch 7/21/2020 7/21/2020 8/18/2020 8/18/2020   Trials Right Left Right Left   1  2  3 14+  15+  14+ 17-  17-  17- 19-  19-  18- 20-  19-  19-   Average 15 17 19 19     3 Pt Pinch 7/21/2020 7/21/2020 8/18/2020 8/18/2020   Trials Right Left Right Left   1  2  3 13+  14+  14+ 17-  17-  17- 16+  17+  16+ 19-  18-  18-   Average 14 17 16 18     Palpation  Pain Report:  - none    + mild    ++ moderate    +++ severe    7/21/2020 8/18/2020   MEP R:-  L:-    Flexors R:-  L:-    Volar wrist R:+  L:- R:-  L:-   Thenars R:-  L:-    LEP R:-  L:-    Extensors R:-  L:-      Please refer to the daily flowsheet for treatment provided today.     Assessment:  Response to therapy has been improvement to:  Strength:   and pinch  Pain:  intensity of pain is decreased  Paresthesias:  Median nerve - less intense numbness and tingling    Overall Assessment:  Patient's symptoms are resolving and patient is independent in home exercise program  STG/LTG:  STGoals have been reviewed and progress or achievement has occurred;  see goal sheet for details and updates.  I have re-evaluated this patient and find that the nature, scope, duration and intensity of the therapy is appropriate for the medical condition of the patient.    Plan:  Frequency/Duration:  Discharge from Hand Therapy; continue home program.    Recommendations for  Continued Therapy  Home Exercise Program:  CTS prevention, avoid prolonged wrist flexion   Tendon gliding with 3 fists and FDS  Forearm flexor stretche  Partial proximal median nerve gliding, full glide as salena  Distal median nerve gliding  Pec stretch in doorway  Postural exercises with chin tucks and scapular retraction    Wear OTC wrist splints sleeping  Work breaks 30-60 seconds every 30-60 minutes with RTW when at computer for long periods

## 2020-09-01 DIAGNOSIS — O98.313 GENITAL HERPES AFFECTING PREGNANCY IN THIRD TRIMESTER: ICD-10-CM

## 2020-09-01 DIAGNOSIS — Z34.03 PRENATAL CARE, FIRST PREGNANCY, THIRD TRIMESTER: ICD-10-CM

## 2020-09-01 DIAGNOSIS — A60.09 GENITAL HERPES AFFECTING PREGNANCY IN THIRD TRIMESTER: ICD-10-CM

## 2020-09-01 RX ORDER — VALACYCLOVIR HYDROCHLORIDE 1 G/1
1000 TABLET, FILM COATED ORAL DAILY
Qty: 90 TABLET | Refills: 3 | Status: SHIPPED | OUTPATIENT
Start: 2020-09-01

## 2020-09-01 NOTE — TELEPHONE ENCOUNTER
7/14/2020 last office visit for 6 week PP visit.  Protocol not passed for med refill by RN    Please review and advise.  Thank you.    Mandi Deng   [facility]  RN

## 2020-09-01 NOTE — TELEPHONE ENCOUNTER
"Requested Prescriptions   Pending Prescriptions Disp Refills     valACYclovir (VALTREX) 1000 mg tablet 60 tablet 3     Sig: Take 1 tablet (1,000 mg) by mouth daily       Antivirals for Herpes Protocol Failed - 9/1/2020  1:29 PM        Failed - Medication is active on med list        Passed - Patient is age 12 or older        Passed - Recent (12 mo) or future (30 days) visit within the authorizing provider's specialty     Patient has had an office visit with the authorizing provider or a provider within the authorizing providers department within the previous 12 mos or has a future within next 30 days. See \"Patient Info\" tab in inbasket, or \"Choose Columns\" in Meds & Orders section of the refill encounter.              Passed - Normal serum creatinine on file in past 12 months     Recent Labs   Lab Test 05/26/20  1104   CR 0.74       Ok to refill medication if creatinine is low               "

## 2020-10-19 ENCOUNTER — OFFICE VISIT (OUTPATIENT)
Dept: LAB | Facility: SCHOOL | Age: 35
End: 2020-10-19
Payer: COMMERCIAL

## 2020-10-19 VITALS
SYSTOLIC BLOOD PRESSURE: 104 MMHG | OXYGEN SATURATION: 96 % | WEIGHT: 167.8 LBS | TEMPERATURE: 98.2 F | RESPIRATION RATE: 16 BRPM | DIASTOLIC BLOOD PRESSURE: 78 MMHG | HEART RATE: 92 BPM | BODY MASS INDEX: 27.96 KG/M2 | HEIGHT: 65 IN

## 2020-10-19 DIAGNOSIS — Z00.00 WELLNESS EXAMINATION: Primary | ICD-10-CM

## 2020-10-19 PROCEDURE — 99213 OFFICE O/P EST LOW 20 MIN: CPT

## 2020-10-19 RX ORDER — CHOLECALCIFEROL (VITAMIN D3) 50 MCG
1 TABLET ORAL DAILY
COMMUNITY
End: 2023-08-02

## 2020-10-19 ASSESSMENT — MIFFLIN-ST. JEOR: SCORE: 1457.02

## 2020-10-19 NOTE — PATIENT INSTRUCTIONS
"                Gisele Meadows has completed a Wellness Check at the [facility] on 10/19/2020.      ____________________________________________  [employer] PROVID[employer]                                                                               Wellness Visit Recommendations:      See your regular primary care health provider every year in order to help stay healthy.    Review health changes.     Review your medicines if your doctor has prescribed any.    Talk to your provider about how often to have your cholesterol checked.    If you are at risk for diabetes, you should have a diabetes test (fasting glucose).    Shots: Get a flu shot each year. Get a tetanus shot every 10 years.     Review with your primary care provider other immunizations that you may need based on your age and/or medical/surgical history    Nutrition:     Eat at least 5 servings of fruits and vegetables each day.    Eat whole-grain bread, whole-wheat pasta and brown rice instead of white grains and rice.    Preventive Care to be reviewed by your primary care provider:    Females:        Cervical Cancer Screening                          Breast Cancer Screening                          Colon Cancer Screening  Males:             Prostrate Cancer Screening                          Colon Cancer Screening      Lifestyle:    Exercise at least 150 minutes a week (30 minutes a day, 5 days of the week). This will help you control your weight and help prevent disease or manage disease.    Limit alcohol to one drink per day or less depending on your past medical history.    No smoking.     Wear sunscreen to prevent skin cancer.    See your dentist every six months for an exam and cleaning.    Today's Vital Signs:  /78   Pulse 92   Temp 98.2  F (36.8  C) (Tympanic)   Resp 16   Ht 1.651 m (5' 5\")   Wt 76.1 kg (167 lb 12.8 oz)   SpO2 96%   BMI 27.92 kg/m    "

## 2020-10-19 NOTE — PROGRESS NOTES
"  SUBJECTIVE:  CC: Gisele Meadows is an 35 year old woman who presents for Wellness Check at [id number].     Review of Healthy Lifestyle:    Do you get at least three servings of calcium containing foods daily (dairy, green leafy vegetables, etc.)? yes     Do you have a high-fiber diet? yes     Amount of exercise or daily activities, outside of work: 30 minute(s) per day    Do you wear sunscreen on a regular basis? No      Are you taking your medications regularly Yes as needed     Have you had an eye exam in the past two years? no    Do you see a dentist twice per year? yes    Do you have sleep apnea, excessive snoring or excessive daytime drowsiness? no    Do you use tobacco in any form? no       OBJECTIVE:    Vitals: /78   Pulse 92   Temp 98.2  F (36.8  C) (Tympanic)   Resp 16   Ht 1.651 m (5' 5\")   Wt 76.1 kg (167 lb 12.8 oz)   SpO2 96%   BMI 27.92 kg/m    BMI= Body mass index is 27.92 kg/m .    HEARING: Right Ear:        500Hz:  RESPONSE- on Level: 25 db   1000 Hz: RESPONSE- on Level: 25 db   2000 Hz: RESPONSE- on Level:   20 db    4000 Hz: RESPONSE- on Level:   20 db     Left Ear:       500Hz:  RESPONSE- on Level: 40 db   1000 Hz: RESPONSE- on Level:   20 db    2000 Hz: RESPONSE- on Level:   20 db    4000 Hz: RESPONSE- on Level:   20 db     VISION:  Right eye:  20/20  Left eye:     20/20  Both eyes: 20/20  Corrective lenses?  No    Medication Reconciliation: complete    ASSESSMENT/PLAN:  Patient is here today for wellness examination.  Patient was given information on recommendations for health, preventative care, diet and lifestyle changes for her health.  No referrals needed today.    COUNSELING:      reports that she quit smoking about 16 months ago. Her smoking use included cigarettes. She smoked 0.10 packs per day. She has never used smokeless tobacco.    Estimated body mass index is 27.92 kg/m  as calculated from the following:    Height as of this " "encounter: 1.651 m (5' 5\").    Weight as of this encounter: 76.1 kg (167 lb 12.8 oz).   Weight management plan: Discussed healthy diet and exercise guidelines    Nadia Galvez NP on 10/19/2020 at 11:46 AM  [facility]     "

## 2020-11-16 ENCOUNTER — VIRTUAL VISIT (OUTPATIENT)
Dept: FAMILY MEDICINE | Facility: OTHER | Age: 35
End: 2020-11-16
Payer: COMMERCIAL

## 2020-11-16 PROCEDURE — 99421 OL DIG E/M SVC 5-10 MIN: CPT | Performed by: PHYSICIAN ASSISTANT

## 2020-11-16 NOTE — PROGRESS NOTES
"Date: 2020 09:58:30  Clinician: Mathew Ndiaye  Clinician NPI: [id number]  Patient: Gisele Meadows  Patient : 1985  Patient Address: [address] [place], [place] [address] [address]  Patient Phone: [phone]  Visit Protocol: URI  Patient Summary:  Gisele is a 35 year old ( : 1985 ) female who initiated a [facility] Visit for COVID-19 (Coronavirus) evaluation and screening. When asked the question \"Please sign me up to receive news, health information and promotions from [facility].\", Gisele responded \"No\".    Gisele states her symptoms started 1-2 days ago. She is experiencing mild difficulty breathing with activities but can speak normally in full sentences.   Gisele denies having vomiting, rhinitis, facial pain or pressure, myalgias, chills, malaise, sore throat, teeth pain, ageusia, diarrhea, ear pain, headache, wheezing, fever, cough, nasal congestion, nausea, and anosmia. She also denies taking antibiotic medication in the past month and having recent facial or sinus surgery in the past 60 days.    Pertinent COVID-19 (Coronavirus) information  Gisele does not work or volunteer as healthcare worker or a . In the past 14 days, Gisele has not worked or volunteered at a healthcare facility or group living setting.   In the past 14 days, she also has not lived in a congregate living setting.   Gisele has not had a close contact with a laboratory-confirmed COVID-19 patient within 14 days of symptom onset.    Since 2019, Gisele has been tested for COVID-19 and has had upper respiratory infection (URI) or influenza-like illness.      Result of COVID-19 test: Negative     Date of her COVID-19 test: 2020     Date(s) of previous URI or influenza-like illness (free-text): 2020     Symptoms Gisele experienced during previous URI or influenza-like illness as reported by the patient (free-text): nasal congestion, cough        Pertinent medical history  Gisele does not get yeast " infections when she takes antibiotics.   Gisele needs a return to work/school note.   Weight: 153 lbs   Gisele does not smoke or use smokeless tobacco.   She denies pregnancy and is breastfeeding. She has menstruated in the past month.   Additional information as reported by the patient (free text): My 5 month old was identified as a close contact and is getting tested at 3 this afternoon. I hoping to get tested at the same time, but need a referral to do so. I also started experiencing chest pressure (like the start of a chest cold) on Saturday, November 14th. My daughter's exposure date (and possibly mine as well since it was at her ) was November 10th.   Weight: 153 lbs    MEDICATIONS: vitamin A-vitamin D3-vitamin E-vitamin K oral, Prenatal Vitamin oral, ALLERGIES: Sulfa (Sulfonamide Antibiotics)  Clinician Response:  Dear Gisele,   Your symptoms show that you may have coronavirus (COVID-19). This illness can cause fever, cough and trouble breathing. Many people get a mild case and get better on their own. Some people can get very sick.  What should I do?  We would like to test you for this virus.   1. Please call [phone] to schedule your visit. Explain that you were referred by [facility] to have a COVID-19 test. Be ready to share your [facility] visit ID number.  * If you need to schedule in [facility] please call [phone] or for [employer] employees please call [phone].  * If you need to schedule in the [place] area please call [phone]. [place] employees call [phone].  The following will serve as your written order for this COVID Test, ordered by me, for the indication of suspected COVID [Z20.828]: The test will be ordered in [employer], our electronic health record, after you are scheduled. It will show as ordered and authorized by Kong Kay MD.  Order: COVID-19 (Coronavirus) PCR for SYMPTOMATIC testing from [facility].   2. When it's time for your COVID test:  Stay at least 6 feet away from  "others. (If someone will drive you to your test, stay in the backseat, as far away from the  as you can.)   Cover your mouth and nose with a mask, tissue or washcloth.  Go straight to the testing site. Don't make any stops on the way there or back.      3.Starting now: Stay home and away from others (self-isolate) until:   You've had no fever---and no medicine that reduces fever---for one full day (24 hours). And...   Your other symptoms have gotten better. For example, your cough or breathing has improved. And...   At least 10 days have passed since your symptoms started.       During this time, don't leave the house except for testing or medical care.   Stay in your own room, even for meals. Use your own bathroom if you can.   Stay away from others in your home. No hugging, kissing or shaking hands. No visitors.  Don't go to work, school or anywhere else.    Clean \"high touch\" surfaces often (doorknobs, counters, handles, etc.). Use a household cleaning spray or wipes. You'll find a full list of  on the EPA website: www.epa.gov/pesticide-registration/list-n-disinfectants-use-against-sars-cov-2.   Cover your mouth and nose with a mask, tissue or washcloth to avoid spreading germs.  Wash your hands and face often. Use soap and water.  Caregivers in these groups are at risk for severe illness due to COVID-19:  o People 65 years and older  o People who live in a nursing home or long-term care facility  o People with chronic disease (lung, heart, cancer, diabetes, kidney, liver, immunologic)  o People who have a weakened immune system, including those who:   Are in cancer treatment  Take medicine that weakens the immune system, such as corticosteroids  Had a bone marrow or organ transplant  Have an immune deficiency  Have poorly controlled HIV or AIDS  Are obese (body mass index of 40 or higher)  Smoke regularly   o Caregivers should wear gloves while washing dishes, handling laundry and cleaning bedrooms " and bathrooms.  o Use caution when washing and drying laundry: Don't shake dirty laundry, and use the warmest water setting that you can.  o For more tips, go to www.cdc.gov/coronavirus/2019-ncov/downloads/10Things.pdf.       How can I take care of myself?   Get lots of rest. Drink extra fluids (unless a doctor has told you not to).   Take Tylenol (acetaminophen) for fever or pain. If you have liver or kidney problems, ask your family doctor if it's okay to take Tylenol.   Adults can take either:    650 mg (two 325 mg pills) every 4 to 6 hours, or...   1,000 mg (two 500 mg pills) every 8 hours as needed.    Note: Don't take more than 3,000 mg in one day. Acetaminophen is found in many medicines (both prescribed and over-the-counter medicines). Read all labels to be sure you don't take too much.   For children, check the Tylenol bottle for the right dose. The dose is based on the child's age or weight.    If you have other health problems (like cancer, heart failure, an organ transplant or severe kidney disease): Call your specialty clinic if you don't feel better in the next 2 days.       Know when to call 911. Emergency warning signs include:    Trouble breathing or shortness of breath Pain or pressure in the chest that doesn't go away Feeling confused like you haven't felt before, or not being able to wake up Bluish-colored lips or face.  Where can I get more information?   [facility] -- About COVID-19: [email or web address]   CDC -- What to Do If You're Sick: www.cdc.gov/coronavirus/2019-ncov/about/steps-when-sick.html   CDC -- Ending Home Isolation: www.cdc.gov/coronavirus/2019-ncov/hcp/disposition-in-home-patients.html   CDC -- Caring for Someone: www.cdc.gov/coronavirus/2019-ncov/if-you-are-sick/care-for-someone.html   [facility] -- Interim Guidance for Hospital Discharge to Home: www.health.[facility].[place]./diseases/coronavirus/hcp/hospdischarge.pdf   [facility] clinical trials (COVID-19  research studies): clinicalaffairs.[facility]-clinical-trials    Below are the COVID-19 hotlines at the [place] Department of Health ([facility]). Interpreters are available.    For health questions: Call 214-757-7088 or 1-718.324.6112 (7 a.m. to 7 p.m.) For questions about schools and childcare: Call 954-465-4712 or 1-193.896.3269 (7 a.m. to 7 p.m.)    Diagnosis: Contact with and (suspected) exposure to other viral communicable diseases  Diagnosis ICD: Z20.828

## 2021-01-04 ENCOUNTER — MYC MEDICAL ADVICE (OUTPATIENT)
Dept: OBGYN | Facility: CLINIC | Age: 36
End: 2021-01-04

## 2021-01-04 NOTE — TELEPHONE ENCOUNTER
Dr. Valenzuela- do you have a dot phrase you use for this question or what are your current recommendations?     Thank you,   Sherita PIERCE RN   Specialty Clinics

## 2021-01-05 NOTE — TELEPHONE ENCOUNTER
We strongly recommend vaccination in Pregnancy and Breastfeeding   Thank you   Mehrdad Valenzuela MD

## 2021-01-18 NOTE — PATIENT INSTRUCTIONS
Thank you for using the [facility].  If there is no improvement of your condition, please call and schedule an appointment with your primary care provider.      The medication (s), dosing, route and duration was discussed with the patient.  In addition the drug monograph was reviewed and given to the patient for the medication (s).    Viral Upper Respiratory Illness (Adult)  You have a viral upper respiratory illness (URI), which is another term for the common cold. This illness is contagious during the first few days. It is spread through the air by coughing and sneezing. It may also be spread by direct contact (touching the sick person and then touching your own eyes, nose, or mouth). Frequent handwashing will decrease risk of spread. Most viral illnesses go away within 7 to 10 days with rest and simple home remedies. Sometimes the illness may last for several weeks. Antibiotics will not kill a virus, and they are generally not prescribed for this condition.    Home care    If symptoms are severe, rest at home for the first 2 to 3 days. When you resume activity, don't let yourself get too tired.    Avoid being exposed to cigarette smoke (yours or others ).    You may use acetaminophen or ibuprofen to control pain and fever, unless another medicine was prescribed. (Note: If you have chronic liver or kidney disease, have ever had a stomach ulcer or gastrointestinal bleeding, or are taking blood-thinning medicines, talk with your healthcare provider before using these medicines.) Aspirin should never be given to anyone under 18 years of age who is ill with a viral infection or fever. It may cause severe liver or brain damage.    Your appetite may be poor, so a light diet is fine. Avoid dehydration by drinking 6 to 8 glasses of fluids per day (water, soft drinks, juices, tea, or soup). Extra fluids will help loosen secretions in the nose and lungs.    Over-the-counter cold medicines will not  shorten the length of time you re sick, but they may be helpful for the following symptoms: cough, sore throat, and nasal and sinus congestion. (Note: Do not use decongestants if you have high blood pressure.)  Follow-up care  Follow up with your healthcare provider, or as advised.  When to seek medical advice  Call your healthcare provider right away if any of these occur:    Cough with lots of colored sputum (mucus)    Severe headache; face, neck, or ear pain    Difficulty swallowing due to throat pain    Fever of 100.4 F (38 C)  Call 911, or get immediate medical care  Call emergency services right away if any of these occur:    Chest pain, shortness of breath, wheezing, or difficulty breathing    Coughing up blood    Inability to swallow due to throat pain  Date Last Reviewed: 9/13/2015 2000-2017 [employer]. [address] [place] [address], [place] [address]. All rights reserved. This information is not intended as a substitute for professional medical care. Always follow your healthcare professional's instructions.         (4) excellent

## 2021-09-08 ENCOUNTER — NURSE TRIAGE (OUTPATIENT)
Dept: NURSING | Facility: CLINIC | Age: 36
End: 2021-09-08

## 2021-09-08 ENCOUNTER — OFFICE VISIT (OUTPATIENT)
Dept: LAB | Facility: SCHOOL | Age: 36
End: 2021-09-08
Payer: COMMERCIAL

## 2021-09-08 VITALS
OXYGEN SATURATION: 99 % | HEART RATE: 84 BPM | WEIGHT: 146.2 LBS | TEMPERATURE: 99.2 F | HEIGHT: 65 IN | RESPIRATION RATE: 16 BRPM | DIASTOLIC BLOOD PRESSURE: 84 MMHG | BODY MASS INDEX: 24.36 KG/M2 | SYSTOLIC BLOOD PRESSURE: 118 MMHG

## 2021-09-08 DIAGNOSIS — J02.9 SORE THROAT: Primary | ICD-10-CM

## 2021-09-08 PROCEDURE — 87651 STREP A DNA AMP PROBE: CPT | Performed by: NURSE PRACTITIONER

## 2021-09-08 PROCEDURE — 99213 OFFICE O/P EST LOW 20 MIN: CPT

## 2021-09-08 ASSESSMENT — MIFFLIN-ST. JEOR: SCORE: 1354.04

## 2021-09-08 NOTE — PATIENT INSTRUCTIONS
Strep test negative.  Because of exposure, I do think you should be COVID tested. This is ordered.  Would recommend you start Zyrtec daily as well as flonase.

## 2021-09-08 NOTE — LETTER
September 8, 2021      Gisele Meadows  [address] [facility] [address]        To Whom It May Concern:    Gisele Meadows  was seen on 9/8/2021.  Please excuse her  until 9/13/2021 due to illness.        Sincerely,        Cristy Alberts, CNP

## 2021-09-08 NOTE — TELEPHONE ENCOUNTER
ST started yesterday.  Sore ear too.    Transferred to scheduling    Francie Espinoza RN  [facility] Nurse Advisor    Reason for Disposition    Earache also present    Additional Information    Negative: Severe difficulty breathing (e.g., struggling for each breath, speaks in single words)    Negative: Sounds like a life-threatening emergency to the triager    Negative: Throat culture results, call about    Negative: Productive cough is the main symptom    Negative: Runny nose is the main symptom    Negative: Drooling or spitting out saliva (because can't swallow)    Negative: Unable to open mouth completely    Negative: Drinking very little and has signs of dehydration (e.g., no urine > 12 hours, very dry mouth, very lightheaded)    Negative: Patient sounds very sick or weak to the triager    Negative: Difficulty breathing (per caller) but not severe    Negative: Fever > 103 F (39.4 C)    Negative: Refuses to drink anything for > 12 hours    Negative: SEVERE sore throat pain    Negative: Pus on tonsils (back of throat) and swollen neck lymph nodes ('glands')    Protocols used: SORE THROAT-A-OH

## 2021-09-08 NOTE — PROGRESS NOTES
"    Assessment & Plan     Sore throat  Negative rapid strep in clinic. Known COVID exposure. Has had Moderna vaccine, however there has been an increase locally in breakthrough cases, so would recommend COVID testing prior to return to work. Symptomatic care advised.  - Group A Streptococcus PCR Throat Swab  - Symptomatic COVID-19 Virus (Coronavirus) by PCR Nose; Future       Patient Instructions   Strep test negative.  Because of exposure, I do think you should be COVID tested. This is ordered.  Would recommend you start Zyrtec daily as well as flonase.      Return in about 1 week (around 9/15/2021) for worsening or continued symptoms.  Cristy Alberts, CNP  [place] SCHOOL PROVIDER  [facility]     Blas Jaquez is a 36 year old who presents for the following health issues     HPI     Sore Throat      Duration: Started Yesterday    Description (location/character/radiation): Hurts when swallows, scratchy    Intensity:  moderate    Accompanying signs and symptoms: Some headaches    History (similar episodes/previous evaluation): when she was younger    Precipitating or alleviating factors: None    Therapies tried and outcome: None      Above HPI reviewed. Additionally, mild congestion in the morning but history of seasonal allergies. Sore throat has worsened as day has gone on today. No fever, no cough, no anosmia or ageusia. No diarrhea, no rash. Known COVID exposure about 2 weeks ago.      Review of Systems   Constitutional, HEENT, cardiovascular, pulmonary, gi and gu systems are negative, except as otherwise noted.      Objective    /84   Pulse 84   Temp 99.2  F (37.3  C) (Tympanic)   Resp 16   Ht 1.651 m (5' 5\")   Wt 66.3 kg (146 lb 3.2 oz)   SpO2 99%   BMI 24.33 kg/m    Body mass index is 24.33 kg/m .  Physical Exam  Vitals and nursing note reviewed.   Constitutional:       Appearance: Normal appearance.   HENT:      Head: Normocephalic and atraumatic.      Right " Ear: Tympanic membrane and ear canal normal.      Left Ear: Tympanic membrane and ear canal normal.      Nose: Nose normal. No rhinorrhea.      Right Sinus: No maxillary sinus tenderness or frontal sinus tenderness.      Left Sinus: No maxillary sinus tenderness or frontal sinus tenderness.      Mouth/Throat:      Lips: Pink.      Mouth: Mucous membranes are moist.      Pharynx: Oropharynx is clear. Posterior oropharyngeal erythema present.      Comments: Clear PND  Eyes:      General: Lids are normal.      Conjunctiva/sclera: Conjunctivae normal.      Comments: Non icteric   Cardiovascular:      Rate and Rhythm: Normal rate and regular rhythm.      Pulses: Normal pulses.      Heart sounds: Normal heart sounds, S1 normal and S2 normal.   Pulmonary:      Effort: Pulmonary effort is normal.      Breath sounds: Normal breath sounds and air entry.   Musculoskeletal:      Cervical back: Neck supple.   Lymphadenopathy:      Cervical: No cervical adenopathy.   Skin:     General: Skin is warm and dry.   Neurological:      General: No focal deficit present.      Mental Status: She is alert and oriented to person, place, and time.   Psychiatric:         Mood and Affect: Mood normal.         Behavior: Behavior normal.         Thought Content: Thought content normal.         Judgment: Judgment normal.            No results found for this or any previous visit (from the past 24 hour(s)).    Negative Rapid strep in clinic.

## 2021-09-09 ENCOUNTER — LAB (OUTPATIENT)
Dept: URGENT CARE | Facility: URGENT CARE | Age: 36
End: 2021-09-09
Attending: NURSE PRACTITIONER
Payer: COMMERCIAL

## 2021-09-09 DIAGNOSIS — J02.9 SORE THROAT: ICD-10-CM

## 2021-09-09 LAB
GROUP A STREP BY PCR: NOT DETECTED
SARS-COV-2 RNA RESP QL NAA+PROBE: NEGATIVE

## 2021-09-09 PROCEDURE — U0003 INFECTIOUS AGENT DETECTION BY NUCLEIC ACID (DNA OR RNA); SEVERE ACUTE RESPIRATORY SYNDROME CORONAVIRUS 2 (SARS-COV-2) (CORONAVIRUS DISEASE [COVID-19]), AMPLIFIED PROBE TECHNIQUE, MAKING USE OF HIGH THROUGHPUT TECHNOLOGIES AS DESCRIBED BY CMS-2020-01-R: HCPCS

## 2021-09-09 PROCEDURE — U0005 INFEC AGEN DETEC AMPLI PROBE: HCPCS

## 2021-09-18 ENCOUNTER — HEALTH MAINTENANCE LETTER (OUTPATIENT)
Age: 36
End: 2021-09-18

## 2021-10-25 NOTE — NURSING NOTE
"Chief Complaint   Patient presents with     Prenatal Care       Initial /74 (BP Location: Left arm, Patient Position: Chair, Cuff Size: Adult Regular)  Pulse 93  Ht 5' 5\" (1.651 m)  Wt 197 lb 9.6 oz (89.6 kg)  LMP 09/04/2016  BMI 32.88 kg/m2 Estimated body mass index is 32.88 kg/(m^2) as calculated from the following:    Height as of this encounter: 5' 5\" (1.651 m).    Weight as of this encounter: 197 lb 9.6 oz (89.6 kg).  Medication Reconciliation: complete     Lois King LPN      " Telephone call to patient. Explained had to leave a message with [address] [place] [address] on Aging. Patient provided contact information for Lauri Adair --member ID [id number] and Member Services [phone].

## 2022-01-26 ENCOUNTER — OFFICE VISIT (OUTPATIENT)
Dept: LAB | Facility: SCHOOL | Age: 37
End: 2022-01-26
Payer: COMMERCIAL

## 2022-01-26 VITALS
OXYGEN SATURATION: 100 % | TEMPERATURE: 97.7 F | WEIGHT: 152 LBS | HEART RATE: 88 BPM | SYSTOLIC BLOOD PRESSURE: 116 MMHG | HEIGHT: 65 IN | BODY MASS INDEX: 25.33 KG/M2 | DIASTOLIC BLOOD PRESSURE: 72 MMHG

## 2022-01-26 DIAGNOSIS — J02.9 EXUDATIVE PHARYNGITIS: Primary | ICD-10-CM

## 2022-01-26 LAB — DEPRECATED S PYO AG THROAT QL EIA: NEGATIVE

## 2022-01-26 PROCEDURE — 99213 OFFICE O/P EST LOW 20 MIN: CPT

## 2022-01-26 PROCEDURE — 87651 STREP A DNA AMP PROBE: CPT | Performed by: NURSE PRACTITIONER

## 2022-01-26 RX ORDER — AMOXICILLIN 875 MG
875 TABLET ORAL 2 TIMES DAILY
Qty: 20 TABLET | Refills: 0 | Status: SHIPPED | OUTPATIENT
Start: 2022-01-26 | End: 2022-02-02

## 2022-01-26 ASSESSMENT — MIFFLIN-ST. JEOR: SCORE: 1380.35

## 2022-01-26 NOTE — PATIENT INSTRUCTIONS
"Take amoxicillin twice daily with food. Take a probiotic such as Culturelle or Florastor (recommend lactobacillus 50 billion CFU twice daily  by at least one hour from the antibiotic) while on the antibiotic or eat a Greek yogurt containing \"live active cultures\" daily.    Let me know if not improving.    "

## 2022-01-26 NOTE — PROGRESS NOTES
"  Assessment & Plan     Exudative pharyngitis  Ongoing for 10 days, exam c/w strep although rapid test is negative. Will start amoxicillin today. Follow up   - Streptococcus A Rapid Scr w Reflx to PCR; Future  - amoxicillin (AMOXIL) 875 MG tablet; Take 1 tablet (875 mg) by mouth 2 times daily for 7 days         BMI:   Estimated body mass index is 25.29 kg/m  as calculated from the following:    Height as of this encounter: 1.651 m (5' 5\").    Weight as of this encounter: 68.9 kg (152 lb).       Patient Instructions   Take amoxicillin twice daily with food. Take a probiotic such as Culturelle or Florastor (recommend lactobacillus 50 billion CFU twice daily  by at least one hour from the antibiotic) while on the antibiotic or eat a Greek yogurt containing \"live active cultures\" daily.    Let me know if not improving.        Return in about 1 week (around 2/2/2022) for worsening or continued symptoms.  Cristy Alberts, ITZEL  [place] SCHOOL PROVIDER  [facility]     Blas Jaquez is a 36 year old who presents for the following health issues     Sore Throat started since the 17th of January  HPI  ENT Symptoms             Symptoms: cc Present Absent Comment   Fever/Chills   x    Fatigue  x     Muscle Aches   x    Eye Irritation   x    Sneezing   x    Nasal Sai/Drg  x     Sinus Pressure/Pain   x    Loss of smell   x    Dental pain   x    Sore Throat  x     Swollen Glands  x     Ear Pain/Fullness  x     Cough  x     Wheeze   x    Chest Pain   x    Shortness of breath   x    Rash   x    Other   x      Symptom duration:  Started Jan 17th   Symptom severity:  6 out of 10   Treatments tried:  Salt Gargle, lozenges, Ibuprofen   Contacts:  Daughter was sick the week before for ear inf and possible strep and was treated with an Antibiotic            Review of Systems   Constitutional, HEENT, cardiovascular, pulmonary, gi and gu systems are negative, except as otherwise noted.    " "  Objective    /72   Pulse 88   Temp 97.7  F (36.5  C) (Tympanic)   Ht 1.651 m (5' 5\")   Wt 68.9 kg (152 lb)   SpO2 100%   BMI 25.29 kg/m    Body mass index is 25.29 kg/m .  Physical Exam  Vitals and nursing note reviewed.   Constitutional:       Appearance: Normal appearance.   HENT:      Head: Normocephalic and atraumatic.      Right Ear: Tympanic membrane and ear canal normal.      Left Ear: Tympanic membrane and ear canal normal.      Nose: Nose normal. No rhinorrhea.      Right Sinus: No maxillary sinus tenderness or frontal sinus tenderness.      Left Sinus: No maxillary sinus tenderness or frontal sinus tenderness.      Mouth/Throat:      Lips: Pink.      Mouth: Mucous membranes are moist.      Pharynx: Oropharyngeal exudate and posterior oropharyngeal erythema present.   Eyes:      General: Lids are normal.      Conjunctiva/sclera: Conjunctivae normal.      Comments: Non icteric   Cardiovascular:      Rate and Rhythm: Normal rate and regular rhythm.      Pulses: Normal pulses.      Heart sounds: Normal heart sounds, S1 normal and S2 normal.   Pulmonary:      Effort: Pulmonary effort is normal.      Breath sounds: Normal breath sounds and air entry.   Musculoskeletal:      Cervical back: Neck supple.   Lymphadenopathy:      Cervical: Cervical adenopathy present.   Skin:     General: Skin is warm and dry.   Neurological:      General: No focal deficit present.      Mental Status: She is alert and oriented to person, place, and time.   Psychiatric:         Mood and Affect: Mood normal.         Behavior: Behavior normal.         Thought Content: Thought content normal.         Judgment: Judgment normal.                        "

## 2022-01-27 LAB — GROUP A STREP BY PCR: NOT DETECTED

## 2022-11-19 ENCOUNTER — HEALTH MAINTENANCE LETTER (OUTPATIENT)
Age: 37
End: 2022-11-19

## 2023-02-07 NOTE — NURSING NOTE
"Initial /74 (BP Location: Right arm, Patient Position: Chair, Cuff Size: Adult Large)   Pulse 86   Temp 97.6  F (36.4  C) (Tympanic)   Resp 18   Ht 1.702 m (5' 7\")   Wt 87.5 kg (193 lb)   LMP 09/01/2019   BMI 30.23 kg/m   Estimated body mass index is 30.23 kg/m  as calculated from the following:    Height as of this encounter: 1.702 m (5' 7\").    Weight as of this encounter: 87.5 kg (193 lb). .      "
07-Feb-2023 06:23

## 2023-03-20 DIAGNOSIS — Z34.03 PRENATAL CARE, FIRST PREGNANCY, THIRD TRIMESTER: ICD-10-CM

## 2023-03-20 DIAGNOSIS — O98.313 GENITAL HERPES AFFECTING PREGNANCY IN THIRD TRIMESTER: ICD-10-CM

## 2023-03-20 DIAGNOSIS — A60.09 GENITAL HERPES AFFECTING PREGNANCY IN THIRD TRIMESTER: ICD-10-CM

## 2023-03-20 RX ORDER — VALACYCLOVIR HYDROCHLORIDE 1 G/1
1000 TABLET, FILM COATED ORAL DAILY
Qty: 90 TABLET | OUTPATIENT
Start: 2023-03-20

## 2023-03-20 NOTE — TELEPHONE ENCOUNTER
"Last Written Prescription Date:  9/1/2020  Last Fill Quantity: 90,  # refills: 3   Last office visit: Visit date not found with prescribing provider: 7/14/2020 with Dr. Valenzuela   Future Office Visit:          Requested Prescriptions   Pending Prescriptions Disp Refills     valACYclovir (VALTREX) 1000 mg tablet 90 tablet 3     Sig: Take 1 tablet (1,000 mg) by mouth daily       Antivirals for Herpes Protocol Failed - 3/20/2023  2:25 PM        Failed - Recent (12 mo) or future (30 days) visit within the authorizing provider's specialty     Patient has had an office visit with the authorizing provider or a provider within the authorizing providers department within the previous 12 mos or has a future within next 30 days. See \"Patient Info\" tab in inbasket, or \"Choose Columns\" in Meds & Orders section of the refill encounter.              Failed - Normal serum creatinine on file in past 12 months     Recent Labs   Lab Test 05/26/20  1104   CR 0.74       Ok to refill medication if creatinine is low          Passed - Patient is age 12 or older        Passed - Medication is active on med list           Routing refill request to provider for review/approval because:  Monique given x1 and patient did not follow up, please advise  Patient needs to be seen because it has been more than 1 year since last office visit.  Last prescription written by Dr. Lawrence .    Please advise.  Thank you.    Mandi CASTREJON   [facility]           "

## 2023-03-20 NOTE — TELEPHONE ENCOUNTER
Pt has not been seen in ~3 years. It appears that she already got a supply a couple years ago without being seen. Needs to establish with new provider for refills.

## 2023-08-02 ENCOUNTER — OFFICE VISIT (OUTPATIENT)
Dept: FAMILY MEDICINE | Facility: CLINIC | Age: 38
End: 2023-08-02
Payer: COMMERCIAL

## 2023-08-02 VITALS
TEMPERATURE: 97.9 F | DIASTOLIC BLOOD PRESSURE: 82 MMHG | RESPIRATION RATE: 18 BRPM | HEART RATE: 76 BPM | SYSTOLIC BLOOD PRESSURE: 128 MMHG | BODY MASS INDEX: 27.16 KG/M2 | OXYGEN SATURATION: 100 % | HEIGHT: 65 IN | WEIGHT: 163 LBS

## 2023-08-02 DIAGNOSIS — Z00.00 ROUTINE GENERAL MEDICAL EXAMINATION AT A HEALTH CARE FACILITY: Primary | ICD-10-CM

## 2023-08-02 DIAGNOSIS — A60.00 GENITAL HERPES SIMPLEX, UNSPECIFIED SITE: ICD-10-CM

## 2023-08-02 DIAGNOSIS — Z12.4 CERVICAL CANCER SCREENING: ICD-10-CM

## 2023-08-02 PROCEDURE — 99213 OFFICE O/P EST LOW 20 MIN: CPT | Mod: 25 | Performed by: PHYSICIAN ASSISTANT

## 2023-08-02 PROCEDURE — G0145 SCR C/V CYTO,THINLAYER,RESCR: HCPCS | Performed by: PHYSICIAN ASSISTANT

## 2023-08-02 PROCEDURE — 99395 PREV VISIT EST AGE 18-39: CPT | Performed by: PHYSICIAN ASSISTANT

## 2023-08-02 PROCEDURE — 87624 HPV HI-RISK TYP POOLED RSLT: CPT | Performed by: PHYSICIAN ASSISTANT

## 2023-08-02 RX ORDER — VALACYCLOVIR HYDROCHLORIDE 1 G/1
TABLET, FILM COATED ORAL
Qty: 60 TABLET | Refills: 1 | Status: SHIPPED | OUTPATIENT
Start: 2023-08-02

## 2023-08-02 RX ORDER — VALACYCLOVIR HYDROCHLORIDE 1 G/1
1000 TABLET, FILM COATED ORAL DAILY
Qty: 90 TABLET | Refills: 3 | Status: CANCELLED | OUTPATIENT
Start: 2023-08-02

## 2023-08-02 ASSESSMENT — PAIN SCALES - GENERAL: PAINLEVEL: MILD PAIN (3)

## 2023-08-02 ASSESSMENT — ASTHMA QUESTIONNAIRES: ACT_TOTALSCORE: 24

## 2023-08-02 NOTE — NURSING NOTE
"Chief Complaint   Patient presents with    Physical       Initial BP (!) 143/87   Pulse 76   Temp 97.9  F (36.6  C) (Tympanic)   Resp 18   Ht 1.657 m (5' 5.25\")   Wt 73.9 kg (163 lb)   SpO2 100%   BMI 26.92 kg/m   Estimated body mass index is 26.92 kg/m  as calculated from the following:    Height as of this encounter: 1.657 m (5' 5.25\").    Weight as of this encounter: 73.9 kg (163 lb).  Medication Reconciliation: complete    CARLOS Vega MA    "

## 2023-08-02 NOTE — LETTER
My Asthma Action Plan    Name: Gisele Meadows   YOB: 1985  Date: 8/2/2023   My doctor: Kristen M. Kehr, PA-C   My clinic: [facility]        My Rescue Medicine:   Albuterol inhaler (Proair/Ventolin/Proventil HFA)  2-4 puffs EVERY 4 HOURS as needed. Use a spacer if recommended by your provider.   My Asthma Severity:   Intermittent / Exercise Induced  Know your asthma triggers:  no longer using inhalers.             GREEN ZONE   Good Control  I feel good  No cough or wheeze  Can work, sleep and play without asthma symptoms       Take your asthma control medicine every day.     If exercise triggers your asthma, take your rescue medication  15 minutes before exercise or sports, and  During exercise if you have asthma symptoms  Spacer to use with inhaler: If you have a spacer, make sure to use it with your inhaler             YELLOW ZONE Getting Worse  I have ANY of these:  I do not feel good  Cough or wheeze  Chest feels tight  Wake up at night   Keep taking your Green Zone medications  Start taking your rescue medicine:  every 20 minutes for up to 1 hour. Then every 4 hours for 24-48 hours.  If you stay in the Yellow Zone for more than 12-24 hours, contact your doctor.  If you do not return to the Green Zone in 12-24 hours or you get worse, start taking your oral steroid medicine if prescribed by your provider.           RED ZONE Medical Alert - Get Help  I have ANY of these:  I feel awful  Medicine is not helping  Breathing getting harder  Trouble walking or talking  Nose opens wide to breathe       Take your rescue medicine NOW  If your provider has prescribed an oral steroid medicine, start taking it NOW  Call your doctor NOW  If you are still in the Red Zone after 20 minutes and you have not reached your doctor:  Take your rescue medicine again and  Call 911 or go to the emergency room right away    See your regular doctor within 2 weeks of an Emergency Room or Urgent Care visit  for follow-up treatment.          Annual Reminders:  Meet with Asthma Educator,  Flu Shot in the Fall, consider Pneumonia Vaccination for patients with asthma (aged 19 and older).    Pharmacy:    [facility] [address] [place], [place] - [address]  [id number] - [place], [place] - [address]    Electronically signed by Kristen M. Kehr, PA-C   Date: 08/02/23                    Asthma Triggers  How To Control Things That Make Your Asthma Worse    Triggers are things that make your asthma worse.  Look at the list below to help you find your triggers and   what you can do about them. You can help prevent asthma flare-ups by staying away from your triggers.      Trigger                                                          What you can do   Cigarette Smoke  Tobacco smoke can make asthma worse. Do not allow smoking in your home, car or around you.  Be sure no one smokes at a child s day care or school.  If you smoke, ask your health care provider for ways to help you quit.  Ask family members to quit too.  Ask your health care provider for a referral to Quit Plan to help you quit smoking, or call 9-737-863-PLAN.     Colds, Flu, Bronchitis  These are common triggers of asthma. Wash your hands often.  Don t touch your eyes, nose or mouth.  Get a flu shot every year.     Dust Mites  These are tiny bugs that live in cloth or carpet. They are too small to see. Wash sheets and blankets in hot water every week.   Encase pillows and mattress in dust mite proof covers.  Avoid having carpet if you can. If you have carpet, vacuum weekly.   Use a dust mask and HEPA vacuum.   Pollen and Outdoor Mold  Some people are allergic to trees, grass, or weed pollen, or molds. Try to keep your windows closed.  Limit time out doors when pollen count is high.   Ask you health care provider about taking medicine during allergy season.     Animal Dander  Some people are allergic to skin flakes, urine or saliva  from pets with fur or feathers. Keep pets with fur or feathers out of your home.    If you can t keep the pet outdoors, then keep the pet out of your bedroom.  Keep the bedroom door closed.  Keep pets off cloth furniture and away from stuffed toys.     Mice, Rats, and Cockroaches  Some people are allergic to the waste from these pests.   Cover food and garbage.  Clean up spills and food crumbs.  Store grease in the refrigerator.   Keep food out of the bedroom.   Indoor Mold  This can be a trigger if your home has high moisture. Fix leaking faucets, pipes, or other sources of water.   Clean moldy surfaces.  Dehumidify basement if it is damp and smelly.   Smoke, Strong Odors, and Sprays  These can reduce air quality. Stay away from strong odors and sprays, such as perfume, powder, hair spray, paints, smoke incense, paint, cleaning products, candles and new carpet.   Exercise or Sports  Some people with asthma have this trigger. Be active!  Ask your doctor about taking medicine before sports or exercise to prevent symptoms.    Warm up for 5-10 minutes before and after sports or exercise.     Other Triggers of Asthma  Cold air:  Cover your nose and mouth with a scarf.  Sometimes laughing or crying can be a trigger.  Some medicines and food can trigger asthma.

## 2023-08-02 NOTE — PROGRESS NOTES
SUBJECTIVE:   CC: Gisele is an 38 year old who presents for preventive health visit.       2023     9:40 AM   Additional Questions   Roomed by Tonny PEARSON MA       History of Present Illness       Reason for visit:  Physical and check that iud is in place    She eats 4 or more servings of fruits and vegetables daily.She consumes 0 sweetened beverage(s) daily.She exercises with enough effort to increase her heart rate 30 to 60 minutes per day.  She exercises with enough effort to increase her heart rate 5 days per week.   She is taking medications regularly.      Today's PHQ-2 Score:       2023     6:13 PM   PHQ-2 (  [employer])   Q1: Little interest or pleasure in doing things 0   Q2: Feeling down, depressed or hopeless 0   PHQ-2 Score 0   Q1: Little interest or pleasure in doing things Not at all   Q2: Feeling down, depressed or hopeless Not at all   PHQ-2 Score 0       Check IUD if it's still in place. She has had some irregular spotting.         Have you ever done Advance Care Planning? (For example, a Health Directive, POLST, or a discussion with a medical provider or your loved ones about your wishes): No, advance care planning information given to patient to review.  Patient declined advance care planning discussion at this time.    Social History     Tobacco Use    Smoking status: Every Day     Packs/day: 0.10     Types: Cigarettes     Last attempt to quit: 2019     Years since quittin.1    Smokeless tobacco: Never   Substance Use Topics    Alcohol use: Yes     Alcohol/week: 0.0 standard drinks of alcohol     Comment: occas-quit with pregnancy              No data to display                   No data to display              Reviewed orders with patient.  Reviewed health maintenance and updated orders accordingly - Yes  BP Readings from Last 3 Encounters:   23 128/82   22 116/72   21 118/84    Wt Readings from Last 3 Encounters:   23 73.9 kg (163 lb)   22 68.9 kg  (152 lb)   21 66.3 kg (146 lb 3.2 oz)                  Patient Active Problem List   Diagnosis    BERENICE III with severe dysplasia    Exercise-induced asthma    Recurrent cold sores    Internal hemorrhoid    Recurrent genital HSV (herpes simplex virus) infection    Elevated blood pressure affecting pregnancy in third trimester, antepartum    Prenatal care, subsequent pregnancy in third trimester    Vaginal delivery    Encounter for IUD insertion     Past Surgical History:   Procedure Laterality Date    LEEP TX, CERVICAL  14    BERENICE 1-3, Margins negative    MOUTH SURGERY      wisdom teeth    SURGICAL HISTORY OF -  Left     repair of cartialage in left hip       Social History     Tobacco Use    Smoking status: Every Day     Packs/day: 0.10     Types: Cigarettes     Last attempt to quit: 2019     Years since quittin.1    Smokeless tobacco: Never   Substance Use Topics    Alcohol use: Yes     Alcohol/week: 0.0 standard drinks of alcohol     Comment: occas-quit with pregnancy     Family History   Problem Relation Age of Onset    Hypertension Father     Asthma Father     Substance Abuse Maternal Grandmother         alcohol    Other Cancer Maternal Grandmother         skin and lung    Hypertension Maternal Grandfather     Cerebrovascular Disease Paternal Grandmother         stroke and seizures    Bleeding Disorder Paternal Grandfather     No Known Problems Brother          Current Outpatient Medications   Medication Sig Dispense Refill    valACYclovir (VALTREX) 1000 mg tablet Take 2 tabs by mouth twice daily for two doses for outbreak. 60 tablet 1    valACYclovir (VALTREX) 1000 mg tablet Take 1 tablet (1,000 mg) by mouth daily 90 tablet 3     Allergies   Allergen Reactions    Sulfa Antibiotics Hives     Recent Labs   Lab Test 20  1104 20  1522 17  0953 17  0739 17  2130   ALT 27 21 40 72* 60*   CR 0.74  --   --  0.79 0.67   GFRESTIMATED >90  --   --  84 >90  Non   GFR Calc     GFRESTBLACK >90  --   --  >90   GFR Calc   >90   GFR Calc     POTASSIUM  --   --   --  4.3 4.0        Breast Cancer Screening:  Any new diagnosis of family breast, ovarian, or bowel cancer? No    FHS-7:        No data to display              click delete button to remove this line now  Patient under 40 years of age: Routine Mammogram Screening not recommended.   Pertinent mammograms are reviewed under the imaging tab.    History of abnormal Pap smear: YES - other categories - see link Cervical Cytology Screening Guidelines  Last 3 Pap and HPV Results:       Latest Ref Rng & Units 7/14/2020    10:24 AM 7/14/2020    10:06 AM 6/27/2019     9:46 AM   PAP / HPV   PAP (Historical)   NIL  NIL    HPV 16 DNA NEG^Negative Negative      HPV 18 DNA NEG^Negative Negative      Other HR HPV NEG^Negative Negative            Latest Ref Rng & Units 7/14/2020    10:24 AM 7/14/2020    10:06 AM 6/27/2019     9:46 AM   PAP / HPV   PAP (Historical)   NIL  NIL    HPV 16 DNA NEG^Negative Negative      HPV 18 DNA NEG^Negative Negative      Other HR HPV NEG^Negative Negative        Reviewed and updated as needed this visit by clinical staff   Tobacco  Allergies  Meds  Problems  Med Hx  Surg Hx  Fam Hx          Reviewed and updated as needed this visit by Provider   Tobacco  Allergies  Meds  Problems  Med Hx  Surg Hx  Fam Hx         Past Medical History:   Diagnosis Date    Abnormal Pap smear of cervix 2006, 2009, 2010, 2011, 2012, 2013    see problem list    Cervical high risk HPV (human papillomavirus) test positive 2009, 2012, 2015, 2019    see problem list    Chickenpox     BERENICE III with severe dysplasia 2014    LEEP    Genital herpes     Hypertension     noted 2 days after delivery in 2017 lasting 2-3 days    Seizure (H)     x2 at age 20      Past Surgical History:   Procedure Laterality Date    LEEP TX, CERVICAL  9/5/14    BERENICE 1-3, Margins negative    MOUTH SURGERY      ricky  teeth    SURGICAL HISTORY OF -  Left     repair of cartialage in left hip     OB History    Para Term  AB Living   2 2 2 0 0 2   SAB IAB Ectopic Multiple Live Births   0 0 0 0 2      # Outcome Date GA Lbr Kirby/2nd Weight Sex Delivery Anes PTL Lv   2 Term 20 39w0d 03:05 / 00:04 3.572 kg (7 lb 14 oz) F Vag-Spont EPI, Local N CATERINA      Name: Cony      Apgar1: 8  Apgar5: 9   1 Term 17 39w4d / 00:14 3.43 kg (7 lb 9 oz) M Vag-Vacuum EPI N CATERINA      Birth Comments: Called to delivery for fetal heart decelerations, meconium fluid. Infant cried spontaneously after delivery at [facility] chest, but was slightly stunned and became nonvigorous. Brought to warmer, stimulation provided which improved infants tone and cry. At approximately 7 minutes of age infant noted to be slightly grunty, continued to have grunting and retractions until about 9 minutes of age. CPAP started and infant placed on oximeter.  Oxygen saturations 88% and dropped to 78% after CPAP initiated. 50% oxgyen provided and weaned back to room air within 1 minute for improved saturations to 100%. Grunting and retractions continued on CPAP with no tachypnea. Infant brought to nursery for HFNC and observation. Parents updated on condition and plan for sepsis evaluation and respiratory support in nursery.      Name: maurizio      Apgar1: 9  Apgar5: 9       Review of Systems  CONSTITUTIONAL: NEGATIVE for fever, chills, change in weight  INTEGUMENTARU/SKIN: NEGATIVE for worrisome rashes, moles or lesions  EYES: NEGATIVE for vision changes or irritation  ENT: NEGATIVE for ear, mouth and throat problems  RESP: NEGATIVE for significant cough or SOB  BREAST: NEGATIVE for masses, tenderness or discharge  CV: NEGATIVE for chest pain, palpitations or peripheral edema  GI: NEGATIVE for nausea, abdominal pain, heartburn, or change in bowel habits  : NEGATIVE for unusual urinary or vaginal symptoms. She has an IUD, no bleeding for 2 years, now having some  "irregular spotting. Can't feel the strings.   MUSCULOSKELETAL: NEGATIVE for significant arthralgias or myalgia  NEURO: NEGATIVE for weakness, dizziness or paresthesias  PSYCHIATRIC: NEGATIVE for changes in mood or affect     OBJECTIVE:   /82   Pulse 76   Temp 97.9  F (36.6  C) (Tympanic)   Resp 18   Ht 1.657 m (5' 5.25\")   Wt 73.9 kg (163 lb)   SpO2 100%   BMI 26.92 kg/m    Physical Exam  GENERAL: healthy, alert and no distress  EYES: Eyes grossly normal to inspection, PERRL and conjunctivae and sclerae normal  HENT: ear canals and TM's normal, nose and mouth without ulcers or lesions  NECK: no adenopathy, no asymmetry, masses, or scars and thyroid normal to palpation  RESP: lungs clear to auscultation - no rales, rhonchi or wheezes  BREAST: normal without masses, tenderness or nipple discharge and no palpable axillary masses or adenopathy  CV: regular rate and rhythm, normal S1 S2, no S3 or S4, no murmur, click or rub, no peripheral edema and peripheral pulses strong  ABDOMEN: soft, nontender, no hepatosplenomegaly, no masses and bowel sounds normal   (female): normal female external genitalia, normal urethral meatus, vaginal mucosa pink, moist, well rugated, and normal cervix/adnexa/uterus without masses or discharge,. IUD strings are present  MS: no gross musculoskeletal defects noted, no edema  SKIN: no suspicious lesions or rashes  NEURO: Normal strength and tone, mentation intact and speech normal  PSYCH: mentation appears normal, affect normal/bright    Diagnostic Test Results:  Labs reviewed in Epic    ASSESSMENT/PLAN:   (Z00.00) Routine general medical examination at a health care facility  (primary encounter diagnosis)  Comment: Health maintenance reviewed and updated.    (A60.00) Genital herpes simplex, unspecified site  Comment: refills given / used for outbreaks as needed  Plan: valACYclovir (VALTREX) 1000 mg tablet            (Z12.4) Cervical cancer screening  History of abnormal Paps. " "  Last one was completed in 2020  Plan: PAP screen with HPV - recommended age 30 - 65         years           Patient has been advised of split billing requirements and indicates understanding: Yes      COUNSELING:  Reviewed preventive health counseling, as reflected in patient instructions       Regular exercise       Healthy diet/nutrition      BMI:   Estimated body mass index is 26.92 kg/m  as calculated from the following:    Height as of this encounter: 1.657 m (5' 5.25\").    Weight as of this encounter: 73.9 kg (163 lb).   Weight management plan: Discussed healthy diet and exercise guidelines      She reports that she has been smoking cigarettes. She has been smoking an average of .1 packs per day. She has never used smokeless tobacco.  Nicotine/Tobacco Cessation Plan:   Not discussed today          Kristen M. Kehr, PA-C  [facility]  "

## 2023-08-04 LAB
BKR LAB AP GYN ADEQUACY: NORMAL
BKR LAB AP GYN INTERPRETATION: NORMAL
BKR LAB AP HPV REFLEX: NORMAL
BKR LAB AP PREVIOUS ABNL DX: NORMAL
BKR LAB AP PREVIOUS ABNORMAL: NORMAL
PATH REPORT.COMMENTS IMP SPEC: NORMAL
PATH REPORT.COMMENTS IMP SPEC: NORMAL
PATH REPORT.RELEVANT HX SPEC: NORMAL

## 2023-08-08 ENCOUNTER — PATIENT OUTREACH (OUTPATIENT)
Dept: FAMILY MEDICINE | Facility: CLINIC | Age: 38
End: 2023-08-08
Payer: COMMERCIAL

## 2023-08-08 LAB
HUMAN PAPILLOMA VIRUS 16 DNA: NEGATIVE
HUMAN PAPILLOMA VIRUS 18 DNA: NEGATIVE
HUMAN PAPILLOMA VIRUS FINAL DIAGNOSIS: NORMAL
HUMAN PAPILLOMA VIRUS OTHER HR: NEGATIVE

## 2023-09-11 ENCOUNTER — OFFICE VISIT (OUTPATIENT)
Dept: URGENT CARE | Facility: URGENT CARE | Age: 38
End: 2023-09-11
Payer: COMMERCIAL

## 2023-09-11 VITALS
BODY MASS INDEX: 26.82 KG/M2 | DIASTOLIC BLOOD PRESSURE: 83 MMHG | HEART RATE: 79 BPM | WEIGHT: 162.4 LBS | OXYGEN SATURATION: 98 % | SYSTOLIC BLOOD PRESSURE: 135 MMHG | TEMPERATURE: 98.9 F

## 2023-09-11 DIAGNOSIS — J45.990 EXERCISE-INDUCED ASTHMA: ICD-10-CM

## 2023-09-11 DIAGNOSIS — R05.1 ACUTE COUGH: Primary | ICD-10-CM

## 2023-09-11 PROCEDURE — 99213 OFFICE O/P EST LOW 20 MIN: CPT

## 2023-09-11 RX ORDER — BENZONATATE 200 MG/1
200 CAPSULE ORAL 3 TIMES DAILY PRN
Qty: 21 CAPSULE | Refills: 0 | Status: SHIPPED | OUTPATIENT
Start: 2023-09-11

## 2023-09-11 RX ORDER — ALBUTEROL SULFATE 90 UG/1
2 AEROSOL, METERED RESPIRATORY (INHALATION) EVERY 6 HOURS PRN
Qty: 8.5 G | Refills: 0 | Status: SHIPPED | OUTPATIENT
Start: 2023-09-11

## 2023-09-11 NOTE — PROGRESS NOTES
ASSESSMENT:  (R05.1) Acute cough  (primary encounter diagnosis)  Plan: benzonatate (TESSALON) 200 MG capsule    (J45.990) Exercise-induced asthma  Plan: albuterol (PROAIR HFA/PROVENTIL HFA/VENTOLIN         HFA) 108 (90 Base) MCG/ACT inhaler    PLAN:  Informed the patient to use the albuterol inhaler as prescribed.  We also discussed taking benzonatate as prescribed for her cough.  Instructed her to get plenty of rest, drink fluids and using humidifier for her symptoms as humidified air is easier on the nasal and lung tissues compared to dry air.  Discussed the need to follow-up with her primary care provider in 7 to 10 days should symptoms persist.  Patient acknowledged her understanding of the above plan.    The use of Dragon/[employer] Decisions[employer] services may have been used to construct the content in this note; any grammatical or spelling errors are non-intentional. Please contact the author of this note directly if you are in need of any clarification.      IVET Reyes CNP      SUBJECTIVE:   Gisele Meadows is a 38 year old female presenting with a chief complaint of runny nose, cough - non-productive, wheezing, shortness of breath, and fatigue.  Onset of symptoms was 2 day(s) ago.  Course of illness is worsening.    Patient denies: fever, chills, sore throat, vomiting, and diarrhea  Treatment measures tried include Emerg-C, Mucinex, vitamin D and ibuprofen.  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  Patient also reports having a history of exercise-induced asthma which has been well controlled for a number of years.  She indicates that she no longer has her albuterol inhaler that she used in the past when her symptoms would become worse after or during an illness.    At home COVID test negative.     ROS:  Negative except noted above.    OBJECTIVE:  /83   Pulse 79   Temp 98.9  F (37.2  C) (Tympanic)   Wt 73.7 kg (162 lb 6.4 oz)   SpO2 98%   BMI  26.82 kg/m    GENERAL APPEARANCE: healthy, alert and no distress  EYES: EOMI,  PERRL, conjunctiva clear  HENT: nose and mouth without erythema, ulcers or lesions and oral mucous membranes moist, no erythema noted  NECK: supple, nontender, no lymphadenopathy  RESP: lungs clear to auscultation - no rales, rhonchi or wheezes  CV: regular rates and rhythm, normal S1 S2, no murmur noted  SKIN: no suspicious lesions or rashes   Statement Selected

## 2023-09-11 NOTE — PATIENT INSTRUCTIONS
Use the albuterol inhaler as prescribed.  Take benzonatate as prescribed for the cough.  Get plenty of rest and drink fluids.  Use a humidifier for your symptoms as humidified air is easier on the nasal and lung tissues compared to dry air.  Follow-up in 7 to 10 days with your primary care provider should symptoms persist.

## 2024-07-03 ENCOUNTER — PATIENT OUTREACH (OUTPATIENT)
Dept: CARE COORDINATION | Facility: CLINIC | Age: 39
End: 2024-07-03
Payer: COMMERCIAL

## 2024-07-15 ENCOUNTER — PATIENT OUTREACH (OUTPATIENT)
Dept: FAMILY MEDICINE | Facility: CLINIC | Age: 39
End: 2024-07-15
Payer: COMMERCIAL

## 2024-10-07 DIAGNOSIS — A60.00 GENITAL HERPES SIMPLEX, UNSPECIFIED SITE: ICD-10-CM

## 2024-10-07 RX ORDER — VALACYCLOVIR HYDROCHLORIDE 1 G/1
TABLET, FILM COATED ORAL
Qty: 60 TABLET | Refills: 0 | Status: SHIPPED | OUTPATIENT
Start: 2024-10-07

## 2024-10-21 ENCOUNTER — OFFICE VISIT (OUTPATIENT)
Dept: FAMILY MEDICINE | Facility: CLINIC | Age: 39
End: 2024-10-21
Payer: COMMERCIAL

## 2024-10-21 VITALS
BODY MASS INDEX: 28.82 KG/M2 | RESPIRATION RATE: 16 BRPM | HEIGHT: 65 IN | TEMPERATURE: 98.5 F | DIASTOLIC BLOOD PRESSURE: 84 MMHG | WEIGHT: 173 LBS | HEART RATE: 100 BPM | SYSTOLIC BLOOD PRESSURE: 130 MMHG | OXYGEN SATURATION: 99 %

## 2024-10-21 DIAGNOSIS — Z12.4 CERVICAL CANCER SCREENING: ICD-10-CM

## 2024-10-21 DIAGNOSIS — D06.9 CIN III WITH SEVERE DYSPLASIA: ICD-10-CM

## 2024-10-21 DIAGNOSIS — Z00.00 ROUTINE GENERAL MEDICAL EXAMINATION AT A HEALTH CARE FACILITY: Primary | ICD-10-CM

## 2024-10-21 DIAGNOSIS — Z71.6 ENCOUNTER FOR TOBACCO USE CESSATION COUNSELING: ICD-10-CM

## 2024-10-21 PROCEDURE — 90480 ADMN SARSCOV2 VAC 1/ONLY CMP: CPT | Performed by: PHYSICIAN ASSISTANT

## 2024-10-21 PROCEDURE — 90677 PCV20 VACCINE IM: CPT | Performed by: PHYSICIAN ASSISTANT

## 2024-10-21 PROCEDURE — 90471 IMMUNIZATION ADMIN: CPT | Performed by: PHYSICIAN ASSISTANT

## 2024-10-21 PROCEDURE — 87624 HPV HI-RISK TYP POOLED RSLT: CPT | Performed by: PHYSICIAN ASSISTANT

## 2024-10-21 PROCEDURE — G0145 SCR C/V CYTO,THINLAYER,RESCR: HCPCS | Performed by: PHYSICIAN ASSISTANT

## 2024-10-21 PROCEDURE — 99395 PREV VISIT EST AGE 18-39: CPT | Mod: 25 | Performed by: PHYSICIAN ASSISTANT

## 2024-10-21 PROCEDURE — 91320 SARSCV2 VAC 30MCG TRS-SUC IM: CPT | Performed by: PHYSICIAN ASSISTANT

## 2024-10-21 SDOH — HEALTH STABILITY: PHYSICAL HEALTH: ON AVERAGE, HOW MANY MINUTES DO YOU ENGAGE IN EXERCISE AT THIS LEVEL?: 30 MIN

## 2024-10-21 SDOH — HEALTH STABILITY: PHYSICAL HEALTH: ON AVERAGE, HOW MANY DAYS PER WEEK DO YOU ENGAGE IN MODERATE TO STRENUOUS EXERCISE (LIKE A BRISK WALK)?: 3 DAYS

## 2024-10-21 ASSESSMENT — ASTHMA QUESTIONNAIRES
QUESTION_2 LAST FOUR WEEKS HOW OFTEN HAVE YOU HAD SHORTNESS OF BREATH: NOT AT ALL
ACT_TOTALSCORE: 25
QUESTION_5 LAST FOUR WEEKS HOW WOULD YOU RATE YOUR ASTHMA CONTROL: COMPLETELY CONTROLLED
ACT_TOTALSCORE: 25
QUESTION_4 LAST FOUR WEEKS HOW OFTEN HAVE YOU USED YOUR RESCUE INHALER OR NEBULIZER MEDICATION (SUCH AS ALBUTEROL): NOT AT ALL
QUESTION_3 LAST FOUR WEEKS HOW OFTEN DID YOUR ASTHMA SYMPTOMS (WHEEZING, COUGHING, SHORTNESS OF BREATH, CHEST TIGHTNESS OR PAIN) WAKE YOU UP AT NIGHT OR EARLIER THAN USUAL IN THE MORNING: NOT AT ALL
QUESTION_1 LAST FOUR WEEKS HOW MUCH OF THE TIME DID YOUR ASTHMA KEEP YOU FROM GETTING AS MUCH DONE AT WORK, SCHOOL OR AT HOME: NONE OF THE TIME

## 2024-10-21 ASSESSMENT — PAIN SCALES - GENERAL: PAINLEVEL: NO PAIN (0)

## 2024-10-21 ASSESSMENT — SOCIAL DETERMINANTS OF HEALTH (SDOH): HOW OFTEN DO YOU GET TOGETHER WITH FRIENDS OR RELATIVES?: ONCE A WEEK

## 2024-10-21 NOTE — PROGRESS NOTES
"Preventive Care Visit  [facility] Kristen M. Kehr, PA-C, Family Medicine  Oct 21, 2024      Assessment & Plan     Routine general medical examination at a health care facility  Health maintenance reviewed and updated.    Cervical cancer screening  BERENICE III with severe dysplasia  - HPV and Gynecologic Cytology Panel - Recommended Age 30 - 65 Years    Encounter for tobacco use cessation counseling  Discussed prescription options Zyban and chantix   She would like to start with over the counter nicotine replacement.   Patches at 14 or 7 mg recommended or gum            Nicotine/Tobacco Cessation  She reports that she has been smoking cigarettes. She has never used smokeless tobacco.  Nicotine/Tobacco Cessation Plan  Self help information given to patient  See above      BMI  Estimated body mass index is 28.57 kg/m  as calculated from the following:    Height as of this encounter: 1.657 m (5' 5.25\").    Weight as of this encounter: 78.5 kg (173 lb).   Weight management plan: Discussed healthy diet and exercise guidelines    Counseling  Appropriate preventive services were addressed with this patient via screening, questionnaire, or discussion as appropriate for fall prevention, nutrition, physical activity, Tobacco-use cessation, social engagement, weight loss and cognition.  Checklist reviewing preventive services available has been given to the patient.  Reviewed patient's diet, addressing concerns and/or questions.   She is at risk for lack of exercise and has been provided with information to increase physical activity for the benefit of her well-being.   The patient reports drinking more than 3 alcoholic drinks per day and/or more than 7 drhnks per week. The patient was counseled and given information about possible harmful effects of excessive alcohol intake.        Blas Jaquez is a 39 year old, presenting for the following:  Physical        8/2/2023     9:40 AM   Additional Questions "   Roomed by Tonny PEARSON MA        Health Care Directive  Patient does not have a Health Care Directive or Living Will: Discussed advance care planning with patient; however, patient declined at this time.    IBETH  Gisele is here today for preventative care.               10/21/2024   General Health   How would you rate your overall physical health? Good   Feel stress (tense, anxious, or unable to sleep) Very much      (!) STRESS CONCERN      10/21/2024   Nutrition   Three or more servings of calcium each day? Yes   Diet: Regular (no restrictions)   How many servings of fruit and vegetables per day? 4 or more   How many sweetened beverages each day? 0-1            10/21/2024   Exercise   Days per week of moderate/strenous exercise 3 days   Average minutes spent exercising at this level 30 min            10/21/2024   Social Factors   Frequency of gathering with friends or relatives Once a week   Worry food won't last until get money to buy more No   Food not last or not have enough money for food? No   Do you have housing? (Housing is defined as stable permanent housing and does not include staying ouside in a car, in a tent, in an abandoned building, in an overnight shelter, or couch-surfing.) No   Are you worried about losing your housing? No   Lack of transportation? No   Unable to get utilities (heat,electricity)? No   Want help with housing or utility concern? No      (!) HOUSING CONCERN PRESENT      10/21/2024   Dental   Dentist two times every year? Yes            10/21/2024   TB Screening   Were you born outside of the [place]? No            Today's PHQ-2 Score:       10/21/2024     1:41 PM   PHQ-2 ( 1999 [employer])   Q1: Little interest or pleasure in doing things 0   Q2: Feeling down, depressed or hopeless 0   PHQ-2 Score 0   Q1: Little interest or pleasure in doing things Not at all   Q2: Feeling down, depressed or hopeless Not at all   PHQ-2 Score 0           10/21/2024   Substance Use   Alcohol more than 3/day or more  than 7/wk Yes   How often do you have a drink containing alcohol 4 or more times a week   How many alcohol drinks on typical day 1 or 2   How often do you have 5+ drinks at one occasion Less than monthly   Audit 2/3 Score 1   How often not able to stop drinking once started Less than monthly   How often failed to do what normally expected Never   How often needed first drink in am after a heavy drinking session Never   How often feeling of guilt or remorse after drinking Never   How often unable to remember what happened the night before Never   Have you or someone else been injured because of your drinking No   Has anyone been concerned or suggested you cut down on drinking No   TOTAL SCORE - AUDIT 6   Do you use any other substances recreationally? No        Social History     Tobacco Use    Smoking status: Every Day     Current packs/day: 0.00     Types: Cigarettes     Last attempt to quit: 2019     Years since quittin.3    Smokeless tobacco: Never   Vaping Use    Vaping status: Never Used   Substance Use Topics    Alcohol use: Yes     Comment: occas-quit with pregnancy    Drug use: No          Mammogram Screening - Patient under 40 years of age: Routine Mammogram Screening not recommended.         10/21/2024   STI Screening   New sexual partner(s) since last STI/HIV test? No        History of abnormal Pap smear: YES - reflected in Problem List and Health Maintenance accordingly        Latest Ref Rng & Units 2023    10:05 AM 2020    10:24 AM 2020    10:06 AM   PAP / HPV   PAP  Negative for Intraepithelial Lesion or Malignancy (NILM)      PAP (Historical)    NIL    HPV 16 DNA Negative Negative  Negative     HPV 18 DNA Negative Negative  Negative     Other HR HPV Negative Negative  Negative             10/21/2024   Contraception/Family Planning   Questions about contraception or family planning (!) YES  check IUD strings           Reviewed and updated as needed this visit by Provider   Yair   Allergies  Meds  Problems  Med Hx  Surg Hx  Fam Hx            Past Medical History:   Diagnosis Date    Abnormal Pap smear of cervix , , , , ,     see problem list    Cervical high risk HPV (human papillomavirus) test positive , , ,     see problem list    Chickenpox     BERENICE III with severe dysplasia     LEEP    Genital herpes     Hypertension     noted 2 days after delivery in 2017 lasting 2-3 days    Seizure (H)     x2 at age 20    Uncomplicated asthma      Past Surgical History:   Procedure Laterality Date    LEEP TX, CERVICAL  14    BERENICE 1-3, Margins negative    MOUTH SURGERY      wisdom teeth    SURGICAL HISTORY OF -  Left     repair of cartialage in left hip     OB History    Para Term  AB Living   2 2 2 0 0 2   SAB IAB Ectopic Multiple Live Births   0 0 0 0 2      # Outcome Date GA Lbr Kirby/2nd Weight Sex Type Anes PTL Lv   2 Term 20 39w0d 03:05 / 00:04 3.572 kg (7 lb 14 oz) F Vag-Spont EPI, Local N CATERINA      Name: Cony      Apgar1: 8  Apgar5: 9   1 Term 17 39w4d / 00:14 3.43 kg (7 lb 9 oz) M Vag-Vacuum EPI N CATERINA      Birth Comments: Called to delivery for fetal heart decelerations, meconium fluid. Infant cried spontaneously after delivery at [facility] chest, but was slightly stunned and became nonvigorous. Brought to warmer, stimulation provided which improved infants tone and cry. At approximately 7 minutes of age infant noted to be slightly grunty, continued to have grunting and retractions until about 9 minutes of age. CPAP started and infant placed on oximeter.  Oxygen saturations 88% and dropped to 78% after CPAP initiated. 50% oxgyen provided and weaned back to room air within 1 minute for improved saturations to 100%. Grunting and retractions continued on CPAP with no tachypnea. Infant brought to nursery for HFNC and observation. Parents updated on condition and plan for sepsis evaluation and respiratory support in nursery.       Name: maurizio      Apgar1: 9  Apgar5: 9     BP Readings from Last 3 Encounters:   10/21/24 130/84   23 135/83   23 128/82    Wt Readings from Last 3 Encounters:   10/21/24 78.5 kg (173 lb)   23 73.7 kg (162 lb 6.4 oz)   23 73.9 kg (163 lb)                  Patient Active Problem List   Diagnosis    BERENICE III with severe dysplasia    Exercise-induced asthma    Recurrent cold sores    Internal hemorrhoid    Recurrent genital HSV (herpes simplex virus) infection    Elevated blood pressure affecting pregnancy in third trimester, antepartum    Prenatal care, subsequent pregnancy in third trimester    Vaginal delivery    Encounter for IUD insertion     Past Surgical History:   Procedure Laterality Date    LEEP TX, CERVICAL  14    BERENICE 1-3, Margins negative    MOUTH SURGERY      wisdom teeth    SURGICAL HISTORY OF -  Left     repair of cartialage in left hip       Social History     Tobacco Use    Smoking status: Every Day     Current packs/day: 0.00     Types: Cigarettes     Last attempt to quit: 2019     Years since quittin.3    Smokeless tobacco: Never   Substance Use Topics    Alcohol use: Yes     Comment: occas-quit with pregnancy     Family History   Problem Relation Age of Onset    Hypertension Father     Asthma Father     Substance Abuse Maternal Grandmother         alcohol    Other Cancer Maternal Grandmother         skin and lung    Hypertension Maternal Grandfather     Cerebrovascular Disease Paternal Grandmother         stroke and seizures    Bleeding Disorder Paternal Grandfather     No Known Problems Brother          Current Outpatient Medications   Medication Sig Dispense Refill    albuterol (PROAIR HFA/PROVENTIL HFA/VENTOLIN HFA) 108 (90 Base) MCG/ACT inhaler Inhale 2 puffs into the lungs every 6 hours as needed for shortness of breath, wheezing or cough 8.5 g 0    valACYclovir (VALTREX) 1000 mg tablet TAKE 2 TABLETS BY MOUTH TWICE DAILY FOR  2  DOSES  FOR  OUTBREAK,  REPEAT   "AS  NEEDED 60 tablet 0    valACYclovir (VALTREX) 1000 mg tablet Take 1 tablet (1,000 mg) by mouth daily 90 tablet 3     Allergies   Allergen Reactions    Sulfa Antibiotics Hives     Recent Labs   Lab Test 05/26/20  1104 04/30/20  1522 06/19/17  0953 06/14/17  0739 06/11/17  2130   ALT 27 21 40 72* 60*   CR 0.74  --   --  0.79 0.67   GFRESTIMATED >90  --   --  84 >90  Non  GFR Calc     GFRESTBLACK >90  --   --  >90   GFR Calc   >90   GFR Calc     POTASSIUM  --   --   --  4.3 4.0          Review of Systems  Constitutional, HEENT, cardiovascular, pulmonary, GI, , musculoskeletal, neuro, skin, endocrine and psych systems are negative, except as otherwise noted.     Objective    Exam  /84   Pulse 100   Temp 98.5  F (36.9  C) (Tympanic)   Resp 16   Ht 1.657 m (5' 5.25\")   Wt 78.5 kg (173 lb)   LMP  (LMP Unknown)   SpO2 99%   Breastfeeding No   BMI 28.57 kg/m     Estimated body mass index is 28.57 kg/m  as calculated from the following:    Height as of this encounter: 1.657 m (5' 5.25\").    Weight as of this encounter: 78.5 kg (173 lb).    Physical Exam  GENERAL: alert and no distress  EYES: Eyes grossly normal to inspection, PERRL and conjunctivae and sclerae normal  HENT: ear canals and TM's normal, nose and mouth without ulcers or lesions  NECK: no adenopathy, no asymmetry, masses, or scars  RESP: lungs clear to auscultation - no rales, rhonchi or wheezes  BREAST: normal without masses, tenderness or nipple discharge and no palpable axillary masses or adenopathy  CV: regular rate and rhythm, normal S1 S2, no S3 or S4, no murmur, click or rub, no peripheral edema  ABDOMEN: soft, nontender, no hepatosplenomegaly, no masses and bowel sounds normal   (female) w/bimanual: normal female external genitalia, normal urethral meatus, normal vaginal mucosa, and normal cervix/adnexa/uterus without masses or discharge  MS: no gross musculoskeletal defects noted, no " edema  SKIN: no suspicious lesions or rashes  NEURO: Normal strength and tone, mentation intact and speech normal  PSYCH: mentation appears normal, affect normal/bright        Signed Electronically by: Kristen M. Kehr, PA-C

## 2024-10-21 NOTE — LETTER
My Asthma Action Plan    Name: Gisele Meadows   YOB: 1985  Date: 10/21/2024   My doctor: Kristen M. Kehr, PA-C   My clinic: [facility]        My Rescue Medicine:   Albuterol inhaler (Proair/Ventolin/Proventil HFA)  2-4 puffs EVERY 4 HOURS as needed. Use a spacer if recommended by your provider.   My Asthma Severity:   Intermittent / Exercise Induced  Know your asthma triggers:              GREEN ZONE   Good Control  I feel good  No cough or wheeze  Can work, sleep and play without asthma symptoms       Take your asthma control medicine every day.     If exercise triggers your asthma, take your rescue medication  15 minutes before exercise or sports, and  During exercise if you have asthma symptoms  Spacer to use with inhaler: If you have a spacer, make sure to use it with your inhaler             YELLOW ZONE Getting Worse  I have ANY of these:  I do not feel good  Cough or wheeze  Chest feels tight  Wake up at night   Keep taking your Green Zone medications  Start taking your rescue medicine:  every 20 minutes for up to 1 hour. Then every 4 hours for 24-48 hours.  If you stay in the Yellow Zone for more than 12-24 hours, contact your doctor.  If you do not return to the Green Zone in 12-24 hours or you get worse, start taking your oral steroid medicine if prescribed by your provider.           RED ZONE Medical Alert - Get Help  I have ANY of these:  I feel awful  Medicine is not helping  Breathing getting harder  Trouble walking or talking  Nose opens wide to breathe       Take your rescue medicine NOW  If your provider has prescribed an oral steroid medicine, start taking it NOW  Call your doctor NOW  If you are still in the Red Zone after 20 minutes and you have not reached your doctor:  Take your rescue medicine again and  Call 911 or go to the emergency room right away    See your regular doctor within 2 weeks of an Emergency Room or Urgent Care visit for follow-up  treatment.          Annual Reminders:  Meet with Asthma Educator,  Flu Shot in the Fall, consider Pneumonia Vaccination for patients with asthma (aged 19 and older).    Pharmacy:    [facility] [address] [place], [place] - [address]  [id number] - [place], [place] - [address]    Electronically signed by Kristen M. Kehr, PA-C   Date: 10/21/24                    Asthma Triggers  How To Control Things That Make Your Asthma Worse    Triggers are things that make your asthma worse.  Look at the list below to help you find your triggers and   what you can do about them. You can help prevent asthma flare-ups by staying away from your triggers.      Trigger                                                          What you can do   Cigarette Smoke  Tobacco smoke can make asthma worse. Do not allow smoking in your home, car or around you.  Be sure no one smokes at a child s day care or school.  If you smoke, ask your health care provider for ways to help you quit.  Ask family members to quit too.  Ask your health care provider for a referral to Quit Plan to help you quit smoking, or call 8-645-210-PLAN.     Colds, Flu, Bronchitis  These are common triggers of asthma. Wash your hands often.  Don t touch your eyes, nose or mouth.  Get a flu shot every year.     Dust Mites  These are tiny bugs that live in cloth or carpet. They are too small to see. Wash sheets and blankets in hot water every week.   Encase pillows and mattress in dust mite proof covers.  Avoid having carpet if you can. If you have carpet, vacuum weekly.   Use a dust mask and HEPA vacuum.   Pollen and Outdoor Mold  Some people are allergic to trees, grass, or weed pollen, or molds. Try to keep your windows closed.  Limit time out doors when pollen count is high.   Ask you health care provider about taking medicine during allergy season.     Animal Dander  Some people are allergic to skin flakes, urine or saliva from pets with  fur or feathers. Keep pets with fur or feathers out of your home.    If you can t keep the pet outdoors, then keep the pet out of your bedroom.  Keep the bedroom door closed.  Keep pets off cloth furniture and away from stuffed toys.     Mice, Rats, and Cockroaches  Some people are allergic to the waste from these pests.   Cover food and garbage.  Clean up spills and food crumbs.  Store grease in the refrigerator.   Keep food out of the bedroom.   Indoor Mold  This can be a trigger if your home has high moisture. Fix leaking faucets, pipes, or other sources of water.   Clean moldy surfaces.  Dehumidify basement if it is damp and smelly.   Smoke, Strong Odors, and Sprays  These can reduce air quality. Stay away from strong odors and sprays, such as perfume, powder, hair spray, paints, smoke incense, paint, cleaning products, candles and new carpet.   Exercise or Sports  Some people with asthma have this trigger. Be active!  Ask your doctor about taking medicine before sports or exercise to prevent symptoms.    Warm up for 5-10 minutes before and after sports or exercise.     Other Triggers of Asthma  Cold air:  Cover your nose and mouth with a scarf.  Sometimes laughing or crying can be a trigger.  Some medicines and food can trigger asthma.

## 2024-10-21 NOTE — PATIENT INSTRUCTIONS
[facility] -   [facility] -               Patient Education   Preventive Care Advice   This is general advice given by our system to help you stay healthy. However, your care team may have specific advice just for you. Please talk to your care team about your preventive care needs.  Nutrition  Eat 5 or more servings of fruits and vegetables each day.  Try wheat bread, brown rice and whole grain pasta (instead of white bread, rice, and pasta).  Get enough calcium and vitamin D. Check the label on foods and aim for 100% of the RDA (recommended daily allowance).  Lifestyle  Exercise at least 150 minutes each week  (30 minutes a day, 5 days a week).  Do muscle strengthening activities 2 days a week. These help control your weight and prevent disease.  No smoking.  Wear sunscreen to prevent skin cancer.  Have a dental exam and cleaning every 6 months.  Yearly exams  See your health care team every year to talk about:  Any changes in your health.  Any medicines your care team has prescribed.  Preventive care, family planning, and ways to prevent chronic diseases.  Shots (vaccines)   HPV shots (up to age 26), if you've never had them before.  Hepatitis B shots (up to age 59), if you've never had them before.  COVID-19 shot: Get this shot when it's due.  Flu shot: Get a flu shot every year.  Tetanus shot: Get a tetanus shot every 10 years.  Pneumococcal, hepatitis A, and RSV shots: Ask your care team if you need these based on your risk.  Shingles shot (for age 50 and up)  General health tests  Diabetes screening:  Starting at age 35, Get screened for diabetes at least every 3 years.  If you are younger than age 35, ask your care team if you should be screened for diabetes.  Cholesterol test: At age 39, start having a cholesterol test every 5 years, or more often if advised.  Bone density scan (DEXA): At age 50, ask your care team if you should have this scan for osteoporosis (brittle bones).  Hepatitis C: Get tested at least once  in your life.  STIs (sexually transmitted infections)  Before age 24: Ask your care team if you should be screened for STIs.  After age 24: Get screened for STIs if you're at risk. You are at risk for STIs (including HIV) if:  You are sexually active with more than one person.  You don't use condoms every time.  You or a partner was diagnosed with a sexually transmitted infection.  If you are at risk for HIV, ask about PrEP medicine to prevent HIV.  Get tested for HIV at least once in your life, whether you are at risk for HIV or not.  Cancer screening tests  Cervical cancer screening: If you have a cervix, begin getting regular cervical cancer screening tests starting at age 21.  Breast cancer scan (mammogram): If you've ever had breasts, begin having regular mammograms starting at age 40. This is a scan to check for breast cancer.  Colon cancer screening: It is important to start screening for colon cancer at age 45.  Have a colonoscopy test every 10 years (or more often if you're at risk) Or, ask your provider about stool tests like a FIT test every year or Cologuard test every 3 years.  To learn more about your testing options, visit:   .  For help making a decision, visit:   https://bit.ly/jl28310.  Prostate cancer screening test: If you have a prostate, ask your care team if a prostate cancer screening test (PSA) at age 55 is right for you.  Lung cancer screening: If you are a current or former smoker ages 50 to 80, ask your care team if ongoing lung cancer screenings are right for you.  For informational purposes only. Not to replace the advice of your health care provider. Copyright   2023 [facility]. All rights reserved. Clinically reviewed by the [facility] Transitions Program. FIGS [id number] - REV 01/24.  Learning About Stress  What is stress?     Stress is your body's response to a hard situation. Your body can have a physical, emotional, or mental response. Stress is a fact of  life for most people, and it affects everyone differently. What causes stress for you may not be stressful for someone else.  A lot of things can cause stress. You may feel stress when you go on a job interview, take a test, or run a race. This kind of short-term stress is normal and even useful. It can help you if you need to work hard or react quickly. For example, stress can help you finish an important job on time.  Long-term stress is caused by ongoing stressful situations or events. Examples of long-term stress include long-term health problems, ongoing problems at work, or conflicts in your family. Long-term stress can harm your health.  How does stress affect your health?  When you are stressed, your body responds as though you are in danger. It makes hormones that speed up your heart, make you breathe faster, and give you a burst of energy. This is called the fight-or-flight stress response. If the stress is over quickly, your body goes back to normal and no harm is done.  But if stress happens too often or lasts too long, it can have bad effects. Long-term stress can make you more likely to get sick, and it can make symptoms of some diseases worse. If you tense up when you are stressed, you may develop neck, shoulder, or low back pain. Stress is linked to high blood pressure and heart disease.  Stress also harms your emotional health. It can make you paredes, tense, or depressed. Your relationships may suffer, and you may not do well at work or school.  What can you do to manage stress?  You can try these things to help manage stress:   Do something active. Exercise or activity can help reduce stress. Walking is a great way to get started. Even everyday activities such as housecleaning or yard work can help.  Try yoga or paty chi. These techniques combine exercise and meditation. You may need some training at first to learn them.  Do something you enjoy. For example, listen to music or go to a movie. Practice  "your hobby or do volunteer work.  Meditate. This can help you relax, because you are not worrying about what happened before or what may happen in the future.  Do guided imagery. Imagine yourself in any setting that helps you feel calm. You can use online videos, books, or a teacher to guide you.  Do breathing exercises. For example:  From a standing position, bend forward from the waist with your knees slightly bent. Let your arms dangle close to the floor.  Breathe in slowly and deeply as you return to a standing position. Roll up slowly and lift your head last.  Hold your breath for just a few seconds in the standing position.  Breathe out slowly and bend forward from the waist.  Let your feelings out. Talk, laugh, cry, and express anger when you need to. Talking with supportive friends or family, a counselor, or a anisha leader about your feelings is a healthy way to relieve stress. Avoid discussing your feelings with people who make you feel worse.  Write. It may help to write about things that are bothering you. This helps you find out how much stress you feel and what is causing it. When you know this, you can find better ways to cope.  What can you do to prevent stress?  You might try some of these things to help prevent stress:  Manage your time. This helps you find time to do the things you want and need to do.  Get enough sleep. Your body recovers from the stresses of the day while you are sleeping.  Get support. Your family, friends, and community can make a difference in how you experience stress.  Limit your news feed. Avoid or limit time on social media or news that may make you feel stressed.  Do something active. Exercise or activity can help reduce stress. Walking is a great way to get started.  Where can you learn more?  Go to https://www.healthwise.net/patiented  Enter N032 in the search box to learn more about \"Learning About Stress.\"  Current as of: October 24, 2023  Content Version: 14.2    " "2024 [facility] [employer] [facility].   Care instructions adapted under license by your healthcare professional. If you have questions about a medical condition or this instruction, always ask your healthcare professional. Healthwise, Incorporated disclaims any warranty or liability for your use of this information.    9 Ways to Cut Back on Drinking  Maybe you've found yourself drinking more alcohol than you'd prefer. If you want to cut back, here are some ideas to try.    Think before you drink.  Do you really want a drink, or is it just a habit? If you're used to having a drink at a certain time, try doing something else then.     Look for substitutes.  Find some no-alcohol drinks that you enjoy, like flavored seltzer water, tea with honey, or tonic with a slice of lime. Or try alcohol-free beer or \"virgin\" cocktails (without the alcohol).     Drink more water.  Use water to quench your thirst. Drink a glass of water before you have any alcohol. Have another glass along with every drink or between drinks.     Shrink your drink.  For example, have a bottle of beer instead of a pint. Use a smaller glass for wine. Choose drinks with lower alcohol content (ABV%). Or use less liquor and more mixer in cocktails.     Slow down.  It's easy to drink quickly and without thinking about it. Pay attention, and make each drink last longer.     Do the math.  Total up how much you spend on alcohol each month. How much is that a year? If you cut back, what could you do with the money you save?     Take a break.  Choose a day or two each week when you won't drink at all. Notice how you feel on those days, physically and emotionally. How did you sleep? Do you feel better? Over time, add more break days.     Count calories.  Would you like to lose some weight? For some people that's a good motivator for cutting back. Figure out how many calories are in each drink. How many does that add up to in a day? In a week? In a month?     Practice " "saying no.  Be ready when someone offers you a drink. Try: \"Thanks, I've had enough.\" Or \"Thanks, but I'm cutting back.\" Or \"No, thanks. I feel better when I drink less.\"   Current as of: November 15, 2023  Content Version: 14.2 2024 [employer] [facility] [employer]   Care instructions adapted under license by your healthcare professional. If you have questions about a medical condition or this instruction, always ask your healthcare professional. Healthwise, Incorporated disclaims any warranty or liability for your use of this information.     "

## 2024-10-23 LAB
HPV HR 12 DNA CVX QL NAA+PROBE: NEGATIVE
HPV16 DNA CVX QL NAA+PROBE: NEGATIVE
HPV18 DNA CVX QL NAA+PROBE: NEGATIVE
HUMAN PAPILLOMA VIRUS FINAL DIAGNOSIS: NORMAL

## 2024-10-28 LAB
BKR AP ASSOCIATED HPV REPORT: NORMAL
BKR LAB AP GYN ADEQUACY: NORMAL
BKR LAB AP GYN INTERPRETATION: NORMAL
BKR LAB AP PREVIOUS ABNL DX: NORMAL
BKR LAB AP PREVIOUS ABNORMAL: NORMAL
PATH REPORT.COMMENTS IMP SPEC: NORMAL
PATH REPORT.COMMENTS IMP SPEC: NORMAL
PATH REPORT.RELEVANT HX SPEC: NORMAL

## 2025-02-18 ENCOUNTER — OFFICE VISIT (OUTPATIENT)
Dept: URGENT CARE | Facility: URGENT CARE | Age: 40
End: 2025-02-18
Payer: COMMERCIAL

## 2025-02-18 VITALS
SYSTOLIC BLOOD PRESSURE: 135 MMHG | DIASTOLIC BLOOD PRESSURE: 85 MMHG | WEIGHT: 173 LBS | HEART RATE: 87 BPM | TEMPERATURE: 98.2 F | RESPIRATION RATE: 12 BRPM | OXYGEN SATURATION: 99 % | BODY MASS INDEX: 28.57 KG/M2

## 2025-02-18 DIAGNOSIS — R19.7 DIARRHEA, UNSPECIFIED TYPE: Primary | ICD-10-CM

## 2025-02-18 PROCEDURE — 87507 IADNA-DNA/RNA PROBE TQ 12-25: CPT | Performed by: INTERNAL MEDICINE

## 2025-02-18 PROCEDURE — 99213 OFFICE O/P EST LOW 20 MIN: CPT | Performed by: INTERNAL MEDICINE

## 2025-02-18 RX ORDER — HYOSCYAMINE SULFATE 0.12 MG/1
TABLET SUBLINGUAL
COMMUNITY
Start: 2025-02-13

## 2025-02-18 NOTE — PROGRESS NOTES
SUBJECTIVE:  Gisele Meadows is an 39 year old female who presents for loose stools.  Started 8 days ago with loose stools, then developed diarrhea.  Also some vomiting.  Vomiting for a couple days and then improved, but diarrhea continues.  Did have some abdominal cramping which has lessened some but still has before having bm.  Stools were watery, now slightly thicker than water.  Stools are brown, did have a little red blood a couple times which seemed like from hemorrhoid.  Having at least 10 bms a day, has had two this morning.  Wakes up during night to have Bm some too.  No fevers, chills, sweats.  No recent travel.  No recent antibiotics.  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  Took some hyoscamine for cramping which helped a little.     PMH:   has a past medical history of Abnormal Pap smear of cervix (, , , , , ), Cervical high risk HPV (human papillomavirus) test positive (, , , ), Chickenpox, BERENICE III with severe dysplasia (), Genital herpes, Hypertension, Seizure (H), and Uncomplicated asthma.  Patient Active Problem List   Diagnosis    BERENICE III with severe dysplasia    Exercise-induced asthma    Recurrent cold sores    Internal hemorrhoid    Recurrent genital HSV (herpes simplex virus) infection    Elevated blood pressure affecting pregnancy in third trimester, antepartum    Prenatal care, subsequent pregnancy in third trimester    Vaginal delivery    Encounter for IUD insertion     Social History     Socioeconomic History    Marital status:      Spouse name: None    Number of children: 1    Years of education: None    Highest education level: None   Tobacco Use    Smoking status: Every Day     Current packs/day: 0.00     Types: Cigarettes     Last attempt to quit: 2019     Years since quittin.7    Smokeless tobacco: Never   Vaping Use    Vaping status: Never Used   Substance and Sexual Activity    Alcohol use: Yes     Comment: occas-quit with  pregnancy    Drug use: No    Sexual activity: Yes     Partners: Male     Birth control/protection: I.U.D.   Other Topics Concern    Parent/sibling w/ CABG, MI or angioplasty before 65F 55M? No     Social Drivers of Health     Financial Resource Strain: Low Risk  (10/21/2024)    Financial Resource Strain     Within the past 12 months, have you or your family members you live with been unable to get utilities (heat, electricity) when it was really needed?: No   Food Insecurity: Low Risk  (10/21/2024)    Food Insecurity     Within the past 12 months, did you worry that your food would run out before you got money to buy more?: No     Within the past 12 months, did the food you bought just not last and you didn t have money to get more?: No   Transportation Needs: Low Risk  (10/21/2024)    Transportation Needs     Within the past 12 months, has lack of transportation kept you from medical appointments, getting your medicines, non-medical meetings or appointments, work, or from getting things that you need?: No   Physical Activity: Insufficiently Active (10/21/2024)    Exercise Vital Sign     Days of Exercise per Week: 3 days     Minutes of Exercise per Session: 30 min   Stress: Stress Concern Present (10/21/2024)    [employer] [place] [employer] - Occupational Stress Questionnaire     Feeling of Stress : Very much   Social Connections: Unknown (10/21/2024)    Social Connection and Isolation Panel [NHANES]     Frequency of Social Gatherings with Friends and Family: Once a week   Interpersonal Safety: Low Risk  (10/21/2024)    Interpersonal Safety     Do you feel physically and emotionally safe where you currently live?: Yes     Within the past 12 months, have you been hit, slapped, kicked or otherwise physically hurt by someone?: No     Within the past 12 months, have you been humiliated or emotionally abused in other ways by your partner or ex-partner?: No   Housing Stability: High Risk (10/21/2024)     Housing Stability     Do you have housing? : No     Are you worried about losing your housing?: No     Family History   Problem Relation Age of Onset    Hypertension Father     Asthma Father     Substance Abuse Maternal Grandmother         alcohol    Other Cancer Maternal Grandmother         skin and lung    Hypertension Maternal Grandfather     Cerebrovascular Disease Paternal Grandmother         stroke and seizures    Bleeding Disorder Paternal Grandfather     No Known Problems Brother        ALLERGIES:  Sulfa antibiotics    Current Outpatient Medications   Medication Sig Dispense Refill    albuterol (PROAIR HFA/PROVENTIL HFA/VENTOLIN HFA) 108 (90 Base) MCG/ACT inhaler Inhale 2 puffs into the lungs every 6 hours as needed for shortness of breath, wheezing or cough 8.5 g 0    hyoscyamine (LEVSIN/SL) 0.125 MG sublingual tablet DISSOLVE 1 TABLET IN MOUTH EVERY 4 HOURS AS NEEDED FOR CRAMPS      valACYclovir (VALTREX) 1000 mg tablet TAKE 2 TABLETS BY MOUTH TWICE DAILY FOR  2  DOSES  FOR  OUTBREAK,  REPEAT  AS  NEEDED 60 tablet 0    valACYclovir (VALTREX) 1000 mg tablet Take 1 tablet (1,000 mg) by mouth daily 90 tablet 3     No current facility-administered medications for this visit.         ROS:  ROS is done and is negative for general/constitutional, eye, ENT, Respiratory, cardiovascular, GI, , Skin, musculoskeletal except as noted elsewhere.  All other review of systems negative except as noted elsewhere.      OBJECTIVE:  /85   Pulse 87   Temp 98.2  F (36.8  C) (Oral)   Resp 12   Wt 78.5 kg (173 lb)   SpO2 99%   BMI 28.57 kg/m    GENERAL APPEARANCE: Alert, in no acute distress  EYES: normal  NOSE:normal  OROPHARYNX:normal  NECK:No adenopathy,masses or thyromegaly  RESP: normal and clear to auscultation  CV:regular rate and rhythm and no murmurs, clicks, or gallops  ABDOMEN: Abdomen soft, non-tender. BS normal. No masses, organomegaly  SKIN: no ulcers, lesions or rash  MUSCULOSKELETAL:Musculoskeletal  normal      RESULTS  No results found for any visits on 02/18/25..  No results found for this or any previous visit (from the past 48 hours).    ASSESSMENT/PLAN:    ASSESSMENT / PLAN:  (R19.7) Diarrhea, unspecified type  (primary encounter diagnosis)  Comment: sxs for over a week.  Currently most c/with infectious etiology  Plan: Enteric Bacteria and Virus Panel by MAJOR Stool        Check stool studies and treat if needed based on findings.  Discussed with pt that many infectious causes of diarrhea will resolve with some more time.  I reviewed supportive care, otc meds to use if needed, expected course, and signs of concern.  Follow up as needed or if does not improve within 1 week(s) or if worsens in any way.  Reviewed red flag symptoms and is to go to the ER if experiences any of these.    See [facility] for orders, medications, letters, patient instructions    Adele Felipe M.D.

## 2025-02-19 LAB
ADV 40+41 DNA STL QL NAA+NON-PROBE: NEGATIVE
ASTRO TYP 1-8 RNA STL QL NAA+NON-PROBE: NEGATIVE
C CAYETANENSIS DNA STL QL NAA+NON-PROBE: NEGATIVE
CAMPYLOBACTER DNA SPEC NAA+PROBE: NEGATIVE
CRYPTOSP DNA STL QL NAA+NON-PROBE: NEGATIVE
E COLI O157 DNA STL QL NAA+NON-PROBE: NORMAL
E HISTOLYT DNA STL QL NAA+NON-PROBE: NEGATIVE
EAEC ASTA GENE ISLT QL NAA+PROBE: NEGATIVE
EC STX1+STX2 GENES STL QL NAA+NON-PROBE: NEGATIVE
EPEC EAE GENE STL QL NAA+NON-PROBE: NEGATIVE
ETEC LTA+ST1A+ST1B TOX ST NAA+NON-PROBE: NEGATIVE
G LAMBLIA DNA STL QL NAA+NON-PROBE: NEGATIVE
NOROVIRUS GI+II RNA STL QL NAA+NON-PROBE: NEGATIVE
P SHIGELLOIDES DNA STL QL NAA+NON-PROBE: NEGATIVE
RVA RNA STL QL NAA+NON-PROBE: NEGATIVE
SALMONELLA SP RPOD STL QL NAA+PROBE: NEGATIVE
SAPO I+II+IV+V RNA STL QL NAA+NON-PROBE: NEGATIVE
SHIGELLA SP+EIEC IPAH ST NAA+NON-PROBE: NEGATIVE
V CHOLERAE DNA SPEC QL NAA+PROBE: NEGATIVE
VIBRIO DNA SPEC NAA+PROBE: NEGATIVE
Y ENTEROCOL DNA STL QL NAA+PROBE: NEGATIVE

## 2025-04-08 NOTE — PROGRESS NOTES
Subjective:  HPI  Physical Exam                    Objective:  System    Physical Exam    General     ROS    Assessment/Plan:    PROGRESS  REPORT    Progress reporting period is from 2-12-20 to 3-9-20, 3 visits.       SUBJECTIVE  Subjective changes noted by patient:  .  Subjective: 27wks  States her pain is much better, she is having less pubic pain and the hip pain is not an issue. She is wearing the maternity support intermittently. States her back feels really tight. Feels comfortable continuing independently with her HEP at this time and will f/u in PT if she has further issues. Able to tolerate full work day with minimal pain     Current Pain level: 2/10(over pubis).      Initial Pain level: 2/10(hip).   Changes in function:  Yes (See Goal flowsheet attached for changes in current functional level)  Adverse reaction to treatment or activity: None    OBJECTIVE  Changes noted in objective findings:  Yes,   Objective: DR still just less than 3 fingersbreadth at umbilicus. Harder to do corrective ex so will focus on upright abdominal strength ex and TrA activation throughout the day.  Has been instructed in body mechanics, proper transitions and posture along with core/lower extremity strength and balance/flexibility    ASSESSMENT/PLAN  Updated problem list and treatment plan: Diagnosis 1:  Pubic pain-continue HEP, R hip pain resolved    STG/LTGs have been met or progress has been made towards goals:  Yes (See Goal flow sheet completed today.)  Assessment of Progress: The patient's condition is improving.  Self Management Plans:  Patient has been instructed in a home treatment program.  Patient  has been instructed in self management of symptoms.  I have re-evaluated this patient and find that the nature, scope, duration and intensity of the therapy is appropriate for the medical condition of the patient.  Gisele continues to require the following intervention to meet STG and LTG's:  Will trial HEP/self  care    Recommendations:  Gisele will continue with her HEP and will f/u in PT if she has an increase in sx's.    Please refer to the daily flowsheet for treatment today, total treatment time and time spent performing 1:1 timed codes.           92

## 2025-05-22 ENCOUNTER — OFFICE VISIT (OUTPATIENT)
Dept: FAMILY MEDICINE | Facility: CLINIC | Age: 40
End: 2025-05-22
Payer: COMMERCIAL

## 2025-05-22 VITALS
HEIGHT: 65 IN | DIASTOLIC BLOOD PRESSURE: 98 MMHG | RESPIRATION RATE: 14 BRPM | WEIGHT: 173.8 LBS | SYSTOLIC BLOOD PRESSURE: 133 MMHG | OXYGEN SATURATION: 98 % | TEMPERATURE: 98.5 F | HEART RATE: 86 BPM | BODY MASS INDEX: 28.96 KG/M2

## 2025-05-22 DIAGNOSIS — R10.11 ABDOMINAL DISCOMFORT IN RIGHT UPPER QUADRANT: Primary | ICD-10-CM

## 2025-05-22 LAB
ALBUMIN SERPL BCG-MCNC: 4.4 G/DL (ref 3.5–5.2)
ALP SERPL-CCNC: 78 U/L (ref 40–150)
ALT SERPL W P-5'-P-CCNC: 21 U/L (ref 0–50)
ANION GAP SERPL CALCULATED.3IONS-SCNC: 9 MMOL/L (ref 7–15)
AST SERPL W P-5'-P-CCNC: 19 U/L (ref 0–45)
BASOPHILS # BLD AUTO: 0.1 10E3/UL (ref 0–0.2)
BASOPHILS NFR BLD AUTO: 1 %
BILIRUB SERPL-MCNC: 0.3 MG/DL
BUN SERPL-MCNC: 17.1 MG/DL (ref 6–20)
CALCIUM SERPL-MCNC: 9.2 MG/DL (ref 8.8–10.4)
CHLORIDE SERPL-SCNC: 105 MMOL/L (ref 98–107)
CREAT SERPL-MCNC: 0.81 MG/DL (ref 0.51–0.95)
EGFRCR SERPLBLD CKD-EPI 2021: >90 ML/MIN/1.73M2
EOSINOPHIL # BLD AUTO: 0.3 10E3/UL (ref 0–0.7)
EOSINOPHIL NFR BLD AUTO: 3 %
ERYTHROCYTE [DISTWIDTH] IN BLOOD BY AUTOMATED COUNT: 12.3 % (ref 10–15)
GLUCOSE SERPL-MCNC: 93 MG/DL (ref 70–99)
HCO3 SERPL-SCNC: 24 MMOL/L (ref 22–29)
HCT VFR BLD AUTO: 40.8 % (ref 35–47)
HGB BLD-MCNC: 13.7 G/DL (ref 11.7–15.7)
IMM GRANULOCYTES # BLD: 0 10E3/UL
IMM GRANULOCYTES NFR BLD: 0 %
LYMPHOCYTES # BLD AUTO: 2.5 10E3/UL (ref 0.8–5.3)
LYMPHOCYTES NFR BLD AUTO: 28 %
MCH RBC QN AUTO: 31.7 PG (ref 26.5–33)
MCHC RBC AUTO-ENTMCNC: 33.6 G/DL (ref 31.5–36.5)
MCV RBC AUTO: 94 FL (ref 78–100)
MONOCYTES # BLD AUTO: 0.8 10E3/UL (ref 0–1.3)
MONOCYTES NFR BLD AUTO: 9 %
NEUTROPHILS # BLD AUTO: 5.2 10E3/UL (ref 1.6–8.3)
NEUTROPHILS NFR BLD AUTO: 59 %
PLATELET # BLD AUTO: 401 10E3/UL (ref 150–450)
POTASSIUM SERPL-SCNC: 4.8 MMOL/L (ref 3.4–5.3)
PROT SERPL-MCNC: 7.3 G/DL (ref 6.4–8.3)
RBC # BLD AUTO: 4.32 10E6/UL (ref 3.8–5.2)
SODIUM SERPL-SCNC: 138 MMOL/L (ref 135–145)
WBC # BLD AUTO: 8.7 10E3/UL (ref 4–11)

## 2025-05-22 ASSESSMENT — ASTHMA QUESTIONNAIRES
QUESTION_2 LAST FOUR WEEKS HOW OFTEN HAVE YOU HAD SHORTNESS OF BREATH: NOT AT ALL
QUESTION_5 LAST FOUR WEEKS HOW WOULD YOU RATE YOUR ASTHMA CONTROL: COMPLETELY CONTROLLED
QUESTION_3 LAST FOUR WEEKS HOW OFTEN DID YOUR ASTHMA SYMPTOMS (WHEEZING, COUGHING, SHORTNESS OF BREATH, CHEST TIGHTNESS OR PAIN) WAKE YOU UP AT NIGHT OR EARLIER THAN USUAL IN THE MORNING: NOT AT ALL
ACT_TOTALSCORE: 25
QUESTION_1 LAST FOUR WEEKS HOW MUCH OF THE TIME DID YOUR ASTHMA KEEP YOU FROM GETTING AS MUCH DONE AT WORK, SCHOOL OR AT HOME: NONE OF THE TIME
QUESTION_4 LAST FOUR WEEKS HOW OFTEN HAVE YOU USED YOUR RESCUE INHALER OR NEBULIZER MEDICATION (SUCH AS ALBUTEROL): NOT AT ALL

## 2025-05-22 ASSESSMENT — PAIN SCALES - GENERAL: PAINLEVEL_OUTOF10: NO PAIN (0)

## 2025-05-22 NOTE — PROGRESS NOTES
"  Assessment & Plan     Abdominal discomfort in right upper quadrant  -Differentials considered include reflux, gastritis, gallstones, hepatitis, constipation, hernia, irritable bowel syndrome. Will check labs and US, monitor symptom progression.   -No red flag symptoms including dysphagia, unexplained weight loss, bloody emesis or stool, fever, or guarding.   -if unremarkable US and labs and symptoms persist/worsen, could follow up with CT Abd.  - CBC with Platelets & Differential; Future  - Comprehensive metabolic panel; Future  - US Abdomen Limited; Future            BMI  Estimated body mass index is 28.7 kg/m  as calculated from the following:    Height as of this encounter: 1.657 m (5' 5.25\").    Weight as of this encounter: 78.8 kg (173 lb 12.8 oz).             Blas Jaquez is a 39 year old, presenting for the following health issues:  Abdominal Pain        5/22/2025     8:40 AM   Additional Questions   Roomed by Aixa FISH   Accompanied by self       Patient presents to clinic with concerns for abdominal discomfort/fullness  Onset: 1 month  Location: central, right upper  Duration: off/on  Character: burning, full feeling  Severity: mild  Timing: more noticeable at rest  Aggravating Factors: none  Relieving Factors: none  Associated Symptoms: none  Previous Episodes: none  Treatments tried: none    Past Medical History:  Any history of gastrointestinal disorders? Norovirus past winter  Previous surgeries or hospitalizations related to abdominal issues? no    Family History:  Any family history of gastrointestinal diseases including cancer? Grandfather gallstones age 92  Social History:  Alcohol, tobacco, or drug use? no  Recent travel history or dietary changes? no  Review of Systems:  General: Weight loss, fatigue? no  Gastrointestinal: Changes in appetite, stool color, or frequency? No  Genitourinary: Changes in urination or menstrual cycle (if applicable)? Mirena IUD, no menses          History of " "Present Illness       Reason for visit:  Abdominal discomfort  Symptom onset:  3-4 weeks ago   She is taking medications regularly.                      Objective    BP (!) 133/98   Pulse 86   Temp 98.5  F (36.9  C) (Tympanic)   Resp 14   Ht 1.657 m (5' 5.25\")   Wt 78.8 kg (173 lb 12.8 oz)   SpO2 98%   BMI 28.70 kg/m    Body mass index is 28.7 kg/m .  Physical Exam  Constitutional:       General: She is not in acute distress.     Appearance: Normal appearance.   HENT:      Right Ear: External ear normal.      Left Ear: External ear normal.   Eyes:      Pupils: Pupils are equal, round, and reactive to light.   Cardiovascular:      Rate and Rhythm: Normal rate.      Pulses: Normal pulses.      Heart sounds: Normal heart sounds. No murmur heard.     No friction rub. No gallop.   Pulmonary:      Effort: Pulmonary effort is normal. No respiratory distress.      Breath sounds: No wheezing, rhonchi or rales.   Abdominal:      General: Abdomen is flat. Bowel sounds are normal. There is no distension.      Palpations: Abdomen is soft. There is no mass.      Tenderness: There is no abdominal tenderness. There is no guarding or rebound.      Hernia: No hernia is present.   Skin:     General: Skin is warm and dry.   Neurological:      General: No focal deficit present.      Mental Status: She is alert and oriented to person, place, and time.   Psychiatric:         Mood and Affect: Mood normal.         Behavior: Behavior normal.         Thought Content: Thought content normal.         Judgment: Judgment normal.                    Signed Electronically by: IVET Viera CNP    "

## 2025-05-23 ENCOUNTER — RESULTS FOLLOW-UP (OUTPATIENT)
Dept: FAMILY MEDICINE | Facility: CLINIC | Age: 40
End: 2025-05-23

## 2025-05-31 ENCOUNTER — HEALTH MAINTENANCE LETTER (OUTPATIENT)
Age: 40
End: 2025-05-31

## (undated) RX ORDER — FENTANYL CITRATE 50 UG/ML
INJECTION, SOLUTION INTRAMUSCULAR; INTRAVENOUS
Status: DISPENSED
Start: 2020-05-31

## (undated) RX ORDER — LIDOCAINE HYDROCHLORIDE 10 MG/ML
INJECTION, SOLUTION EPIDURAL; INFILTRATION; INTRACAUDAL; PERINEURAL
Status: DISPENSED
Start: 2020-05-31